# Patient Record
Sex: FEMALE | Race: BLACK OR AFRICAN AMERICAN | NOT HISPANIC OR LATINO | Employment: PART TIME | URBAN - METROPOLITAN AREA
[De-identification: names, ages, dates, MRNs, and addresses within clinical notes are randomized per-mention and may not be internally consistent; named-entity substitution may affect disease eponyms.]

---

## 2018-05-01 ENCOUNTER — OFFICE VISIT (OUTPATIENT)
Dept: OCCUPATIONAL MEDICINE | Facility: CLINIC | Age: 23
End: 2018-05-01
Payer: OTHER MISCELLANEOUS

## 2018-05-01 DIAGNOSIS — L03.115 CELLULITIS OF RIGHT FOOT: ICD-10-CM

## 2018-05-01 DIAGNOSIS — S91.331A PUNCTURE WOUND OF RIGHT FOOT, INITIAL ENCOUNTER: Primary | ICD-10-CM

## 2018-05-01 PROCEDURE — 90714 TD VACC NO PRESV 7 YRS+ IM: CPT | Mod: S$GLB,,, | Performed by: PREVENTIVE MEDICINE

## 2018-05-01 PROCEDURE — 99203 OFFICE O/P NEW LOW 30 MIN: CPT | Mod: 25,S$GLB,, | Performed by: PREVENTIVE MEDICINE

## 2018-05-01 PROCEDURE — 90471 IMMUNIZATION ADMIN: CPT | Mod: S$GLB,,, | Performed by: PREVENTIVE MEDICINE

## 2018-05-01 RX ORDER — FLUTICASONE FUROATE AND VILANTEROL TRIFENATATE 100; 25 UG/1; UG/1
POWDER RESPIRATORY (INHALATION)
COMMUNITY
Start: 2018-03-24 | End: 2021-01-04

## 2018-05-01 RX ORDER — DOXYCYCLINE HYCLATE 100 MG
100 TABLET ORAL 2 TIMES DAILY
Qty: 14 TABLET | Refills: 0 | Status: SHIPPED | OUTPATIENT
Start: 2018-05-01 | End: 2021-01-04

## 2018-05-01 NOTE — LETTER
[facility] [place]  [place] [address]  Phone: [phone]  Fax: [phone]    Pt Name: Magda Lowery  Injury Date: 05/01/2018   Employee ID:  Date of Treatment: 05/01/2018   Company: [employer]            Appointment Time: 04:25 PM Arrived:  4:40 PM CDT   Appointment Date: [unfilled] Time Out:   Physician: Lux Muñiz MD        Office Treatment: Magda was seen today for puncture wound.    Diagnoses and all orders for this visit:  EXAM  TD VACCINE  REGULAR DUTY    Puncture wound of right foot, initial encounter  -     doxycycline (VIBRA-TABS) 100 MG tablet; Take 1 tablet (100 mg total) by mouth 2 (two) times daily.  Cellulitis of right foot  Other orders       Patient Instructions:  (Cleanse wound site at home with soap and water daily. TD vaccine updated)    Restrictions: Regular Duty, Discharged from Occupational Health       Return Appointment: NONE

## 2018-05-01 NOTE — PROGRESS NOTES
Subjective:       Patient ID: Magda Lowery is a 22 y.o. female.    Chief Complaint: Puncture Wound    Pt works at [employer].  She stepped on an alarm tag that punctured her RIGHT FOOT.  She is in no pain.  CT      ROS    Objective:      Physical Exam   Constitutional: She appears well-developed and well-nourished.   HENT:   Head: Normocephalic and atraumatic.   Eyes: EOM are normal.   Neck: Normal range of motion.   Cardiovascular: Normal rate.    Pulmonary/Chest: Effort normal.   Musculoskeletal:        Lumbar back: She exhibits normal range of motion, no tenderness, no bony tenderness, no swelling, no edema, no deformity, no laceration, no pain, no spasm and normal pulse.   Neurological: She is alert.   No focal neurologic deficits   Skin: Skin is warm and dry. Ecchymosis noted.   Puncture wound about the plantar aspect of right foot. Mild swelling with ecchymosis noted with palpation of the plantar aspect of right foot. Distal pulses good. Full range of motion of right ankle and foot.   Psychiatric: She has a normal mood and affect.   Nursing note and vitals reviewed.      Assessment:       1. Puncture wound of right foot, initial encounter    2. Cellulitis of right foot        Plan:           Medications Ordered This Encounter      doxycycline (VIBRA-TABS) 100 MG tablet          Sig: Take 1 tablet (100 mg total) by mouth 2 (two) times daily.          Dispense:  14 tablet          Refill:  0  Patient Instructions:  (Cleanse wound site at home with soap and water daily. TD vaccine updated)   Restrictions: Regular Duty, Discharged from Occupational Health  Follow-up if symptoms worsen or fail to improve.

## 2019-03-15 ENCOUNTER — TELEPHONE (OUTPATIENT)
Dept: OBSTETRICS AND GYNECOLOGY | Facility: CLINIC | Age: 24
End: 2019-03-15

## 2019-03-15 NOTE — TELEPHONE ENCOUNTER
----- Message from Cindy Bhatt sent at 3/15/2019  8:38 AM CDT -----  Contact: [phone]/self  Patient called in returning your call. Please advise.

## 2021-01-04 ENCOUNTER — OFFICE VISIT (OUTPATIENT)
Dept: FAMILY MEDICINE | Facility: CLINIC | Age: 26
End: 2021-01-04
Payer: COMMERCIAL

## 2021-01-04 VITALS
BODY MASS INDEX: 36.72 KG/M2 | OXYGEN SATURATION: 99 % | DIASTOLIC BLOOD PRESSURE: 70 MMHG | SYSTOLIC BLOOD PRESSURE: 100 MMHG | TEMPERATURE: 97 F | HEIGHT: 68 IN | WEIGHT: 242.31 LBS | HEART RATE: 88 BPM

## 2021-01-04 DIAGNOSIS — Z01.419 WELL WOMAN EXAM WITH ROUTINE GYNECOLOGICAL EXAM: Primary | ICD-10-CM

## 2021-01-04 DIAGNOSIS — Z13.220 SCREENING FOR CHOLESTEROL LEVEL: ICD-10-CM

## 2021-01-04 DIAGNOSIS — Z11.59 NEED FOR HEPATITIS C SCREENING TEST: ICD-10-CM

## 2021-01-04 DIAGNOSIS — Z11.4 ENCOUNTER FOR SCREENING FOR HIV: ICD-10-CM

## 2021-01-04 DIAGNOSIS — J45.909 ASTHMA, UNSPECIFIED ASTHMA SEVERITY, UNSPECIFIED WHETHER COMPLICATED, UNSPECIFIED WHETHER PERSISTENT: ICD-10-CM

## 2021-01-04 PROCEDURE — 99999 PR PBB SHADOW E&M-EST. PATIENT-LVL III: ICD-10-PCS | Mod: PBBFAC,,, | Performed by: STUDENT IN AN ORGANIZED HEALTH CARE EDUCATION/TRAINING PROGRAM

## 2021-01-04 PROCEDURE — 99385 PREV VISIT NEW AGE 18-39: CPT | Mod: S$GLB,,, | Performed by: STUDENT IN AN ORGANIZED HEALTH CARE EDUCATION/TRAINING PROGRAM

## 2021-01-04 PROCEDURE — 99999 PR PBB SHADOW E&M-EST. PATIENT-LVL III: CPT | Mod: PBBFAC,,, | Performed by: STUDENT IN AN ORGANIZED HEALTH CARE EDUCATION/TRAINING PROGRAM

## 2021-01-04 PROCEDURE — 99385 PR PREVENTIVE VISIT,NEW,18-39: ICD-10-PCS | Mod: S$GLB,,, | Performed by: STUDENT IN AN ORGANIZED HEALTH CARE EDUCATION/TRAINING PROGRAM

## 2021-01-04 PROCEDURE — 1126F PR PAIN SEVERITY QUANTIFIED, NO PAIN PRESENT: ICD-10-PCS | Mod: S$GLB,,, | Performed by: STUDENT IN AN ORGANIZED HEALTH CARE EDUCATION/TRAINING PROGRAM

## 2021-01-04 PROCEDURE — 3008F PR BODY MASS INDEX (BMI) DOCUMENTED: ICD-10-PCS | Mod: CPTII,S$GLB,, | Performed by: STUDENT IN AN ORGANIZED HEALTH CARE EDUCATION/TRAINING PROGRAM

## 2021-01-04 PROCEDURE — 1126F AMNT PAIN NOTED NONE PRSNT: CPT | Mod: S$GLB,,, | Performed by: STUDENT IN AN ORGANIZED HEALTH CARE EDUCATION/TRAINING PROGRAM

## 2021-01-04 PROCEDURE — 3008F BODY MASS INDEX DOCD: CPT | Mod: CPTII,S$GLB,, | Performed by: STUDENT IN AN ORGANIZED HEALTH CARE EDUCATION/TRAINING PROGRAM

## 2021-01-04 RX ORDER — ALBUTEROL SULFATE 0.83 MG/ML
SOLUTION RESPIRATORY (INHALATION)
COMMUNITY
Start: 2021-01-03 | End: 2021-01-04 | Stop reason: SDUPTHER

## 2021-01-04 RX ORDER — ALBUTEROL SULFATE 0.83 MG/ML
2.5 SOLUTION RESPIRATORY (INHALATION)
Qty: 3 ML | Refills: 3 | Status: SHIPPED | OUTPATIENT
Start: 2021-01-04 | End: 2023-08-25

## 2021-01-04 RX ORDER — ALBUTEROL SULFATE 90 UG/1
2 AEROSOL, METERED RESPIRATORY (INHALATION) EVERY 6 HOURS PRN
Qty: 18 G | Refills: 0 | Status: SHIPPED | OUTPATIENT
Start: 2021-01-04 | End: 2021-03-23 | Stop reason: SDUPTHER

## 2021-01-05 ENCOUNTER — LAB VISIT (OUTPATIENT)
Dept: LAB | Facility: HOSPITAL | Age: 26
End: 2021-01-05
Attending: STUDENT IN AN ORGANIZED HEALTH CARE EDUCATION/TRAINING PROGRAM
Payer: COMMERCIAL

## 2021-01-05 DIAGNOSIS — Z11.4 ENCOUNTER FOR SCREENING FOR HIV: ICD-10-CM

## 2021-01-05 DIAGNOSIS — Z13.220 SCREENING FOR CHOLESTEROL LEVEL: ICD-10-CM

## 2021-01-05 DIAGNOSIS — Z11.59 NEED FOR HEPATITIS C SCREENING TEST: ICD-10-CM

## 2021-01-05 DIAGNOSIS — Z01.419 WELL WOMAN EXAM WITH ROUTINE GYNECOLOGICAL EXAM: ICD-10-CM

## 2021-01-05 LAB
25(OH)D3+25(OH)D2 SERPL-MCNC: 7 NG/ML (ref 30–96)
ALBUMIN SERPL BCP-MCNC: 4 G/DL (ref 3.5–5.2)
ALP SERPL-CCNC: 79 U/L (ref 55–135)
ALT SERPL W/O P-5'-P-CCNC: 9 U/L (ref 10–44)
ANION GAP SERPL CALC-SCNC: 9 MMOL/L (ref 8–16)
AST SERPL-CCNC: 16 U/L (ref 10–40)
BASOPHILS # BLD AUTO: 0.02 K/UL (ref 0–0.2)
BASOPHILS NFR BLD: 0.8 % (ref 0–1.9)
BILIRUB SERPL-MCNC: 0.4 MG/DL (ref 0.1–1)
BUN SERPL-MCNC: 8 MG/DL (ref 6–20)
CALCIUM SERPL-MCNC: 9.2 MG/DL (ref 8.7–10.5)
CHLORIDE SERPL-SCNC: 106 MMOL/L (ref 95–110)
CHOLEST SERPL-MCNC: 172 MG/DL (ref 120–199)
CHOLEST/HDLC SERPL: 2.7 {RATIO} (ref 2–5)
CO2 SERPL-SCNC: 26 MMOL/L (ref 23–29)
CREAT SERPL-MCNC: 0.8 MG/DL (ref 0.5–1.4)
DIFFERENTIAL METHOD: ABNORMAL
EOSINOPHIL # BLD AUTO: 0.1 K/UL (ref 0–0.5)
EOSINOPHIL NFR BLD: 4.2 % (ref 0–8)
ERYTHROCYTE [DISTWIDTH] IN BLOOD BY AUTOMATED COUNT: 12.6 % (ref 11.5–14.5)
EST. GFR  (AFRICAN AMERICAN): >60 ML/MIN/1.73 M^2
EST. GFR  (NON AFRICAN AMERICAN): >60 ML/MIN/1.73 M^2
ESTIMATED AVG GLUCOSE: 103 MG/DL (ref 68–131)
GLUCOSE SERPL-MCNC: 85 MG/DL (ref 70–110)
HBA1C MFR BLD HPLC: 5.2 % (ref 4–5.6)
HCT VFR BLD AUTO: 40.5 % (ref 37–48.5)
HDLC SERPL-MCNC: 64 MG/DL (ref 40–75)
HDLC SERPL: 37.2 % (ref 20–50)
HGB BLD-MCNC: 13.1 G/DL (ref 12–16)
IMM GRANULOCYTES # BLD AUTO: 0 K/UL (ref 0–0.04)
IMM GRANULOCYTES NFR BLD AUTO: 0 % (ref 0–0.5)
LDLC SERPL CALC-MCNC: 102 MG/DL (ref 63–159)
LYMPHOCYTES # BLD AUTO: 1 K/UL (ref 1–4.8)
LYMPHOCYTES NFR BLD: 37.9 % (ref 18–48)
MCH RBC QN AUTO: 30.5 PG (ref 27–31)
MCHC RBC AUTO-ENTMCNC: 32.3 G/DL (ref 32–36)
MCV RBC AUTO: 94 FL (ref 82–98)
MONOCYTES # BLD AUTO: 0.3 K/UL (ref 0.3–1)
MONOCYTES NFR BLD: 10 % (ref 4–15)
NEUTROPHILS # BLD AUTO: 1.2 K/UL (ref 1.8–7.7)
NEUTROPHILS NFR BLD: 47.1 % (ref 38–73)
NONHDLC SERPL-MCNC: 108 MG/DL
NRBC BLD-RTO: 0 /100 WBC
PLATELET # BLD AUTO: 281 K/UL (ref 150–350)
PMV BLD AUTO: 9 FL (ref 9.2–12.9)
POTASSIUM SERPL-SCNC: 4 MMOL/L (ref 3.5–5.1)
PROT SERPL-MCNC: 7.6 G/DL (ref 6–8.4)
RBC # BLD AUTO: 4.29 M/UL (ref 4–5.4)
SODIUM SERPL-SCNC: 141 MMOL/L (ref 136–145)
T4 FREE SERPL-MCNC: 0.8 NG/DL (ref 0.71–1.51)
TRIGL SERPL-MCNC: 30 MG/DL (ref 30–150)
TSH SERPL DL<=0.005 MIU/L-ACNC: 8.93 UIU/ML (ref 0.4–4)
WBC # BLD AUTO: 2.61 K/UL (ref 3.9–12.7)

## 2021-01-05 PROCEDURE — 80053 COMPREHEN METABOLIC PANEL: CPT

## 2021-01-05 PROCEDURE — 86803 HEPATITIS C AB TEST: CPT

## 2021-01-05 PROCEDURE — 82306 VITAMIN D 25 HYDROXY: CPT

## 2021-01-05 PROCEDURE — 36415 COLL VENOUS BLD VENIPUNCTURE: CPT | Mod: PO

## 2021-01-05 PROCEDURE — 84439 ASSAY OF FREE THYROXINE: CPT

## 2021-01-05 PROCEDURE — 83036 HEMOGLOBIN GLYCOSYLATED A1C: CPT

## 2021-01-05 PROCEDURE — 86703 HIV-1/HIV-2 1 RESULT ANTBDY: CPT

## 2021-01-05 PROCEDURE — 85025 COMPLETE CBC W/AUTO DIFF WBC: CPT

## 2021-01-05 PROCEDURE — 80061 LIPID PANEL: CPT

## 2021-01-05 PROCEDURE — 84443 ASSAY THYROID STIM HORMONE: CPT

## 2021-01-06 DIAGNOSIS — E03.8 SUBCLINICAL HYPOTHYROIDISM: ICD-10-CM

## 2021-01-06 DIAGNOSIS — E55.9 HYPOVITAMINOSIS D: Primary | ICD-10-CM

## 2021-01-06 LAB
HCV AB SERPL QL IA: NEGATIVE
HIV 1+2 AB+HIV1 P24 AG SERPL QL IA: NEGATIVE

## 2021-01-06 RX ORDER — ERGOCALCIFEROL 1.25 MG/1
50000 CAPSULE ORAL
Qty: 12 CAPSULE | Refills: 0 | Status: SHIPPED | OUTPATIENT
Start: 2021-01-06 | End: 2023-08-25

## 2021-04-05 ENCOUNTER — PATIENT MESSAGE (OUTPATIENT)
Dept: ADMINISTRATIVE | Facility: HOSPITAL | Age: 26
End: 2021-04-05

## 2021-04-06 ENCOUNTER — LAB VISIT (OUTPATIENT)
Dept: LAB | Facility: HOSPITAL | Age: 26
End: 2021-04-06
Attending: STUDENT IN AN ORGANIZED HEALTH CARE EDUCATION/TRAINING PROGRAM
Payer: COMMERCIAL

## 2021-04-06 DIAGNOSIS — E55.9 HYPOVITAMINOSIS D: ICD-10-CM

## 2021-04-06 DIAGNOSIS — E03.8 SUBCLINICAL HYPOTHYROIDISM: ICD-10-CM

## 2021-04-06 LAB
T4 FREE SERPL-MCNC: 0.75 NG/DL (ref 0.71–1.51)
TSH SERPL DL<=0.005 MIU/L-ACNC: 6.18 UIU/ML (ref 0.4–4)

## 2021-04-06 PROCEDURE — 82306 VITAMIN D 25 HYDROXY: CPT | Performed by: STUDENT IN AN ORGANIZED HEALTH CARE EDUCATION/TRAINING PROGRAM

## 2021-04-06 PROCEDURE — 84443 ASSAY THYROID STIM HORMONE: CPT | Performed by: STUDENT IN AN ORGANIZED HEALTH CARE EDUCATION/TRAINING PROGRAM

## 2021-04-06 PROCEDURE — 84439 ASSAY OF FREE THYROXINE: CPT | Performed by: STUDENT IN AN ORGANIZED HEALTH CARE EDUCATION/TRAINING PROGRAM

## 2021-04-06 PROCEDURE — 36415 COLL VENOUS BLD VENIPUNCTURE: CPT | Mod: PO | Performed by: STUDENT IN AN ORGANIZED HEALTH CARE EDUCATION/TRAINING PROGRAM

## 2021-04-07 LAB — 25(OH)D3+25(OH)D2 SERPL-MCNC: 24 NG/ML (ref 30–96)

## 2021-04-08 DIAGNOSIS — E03.8 SUBCLINICAL HYPOTHYROIDISM: Primary | ICD-10-CM

## 2021-05-04 ENCOUNTER — PATIENT MESSAGE (OUTPATIENT)
Dept: RESEARCH | Facility: HOSPITAL | Age: 26
End: 2021-05-04

## 2021-05-20 ENCOUNTER — IMMUNIZATION (OUTPATIENT)
Dept: PHARMACY | Facility: CLINIC | Age: 26
End: 2021-05-20
Payer: COMMERCIAL

## 2021-05-20 DIAGNOSIS — Z23 NEED FOR VACCINATION: Primary | ICD-10-CM

## 2021-07-06 ENCOUNTER — PATIENT MESSAGE (OUTPATIENT)
Dept: ADMINISTRATIVE | Facility: HOSPITAL | Age: 26
End: 2021-07-06

## 2021-10-04 ENCOUNTER — PATIENT MESSAGE (OUTPATIENT)
Dept: ADMINISTRATIVE | Facility: HOSPITAL | Age: 26
End: 2021-10-04

## 2021-10-04 ENCOUNTER — PATIENT MESSAGE (OUTPATIENT)
Dept: FAMILY MEDICINE | Facility: CLINIC | Age: 26
End: 2021-10-04

## 2021-10-08 ENCOUNTER — LAB VISIT (OUTPATIENT)
Dept: LAB | Facility: HOSPITAL | Age: 26
End: 2021-10-08
Attending: STUDENT IN AN ORGANIZED HEALTH CARE EDUCATION/TRAINING PROGRAM
Payer: COMMERCIAL

## 2021-10-08 DIAGNOSIS — E03.8 SUBCLINICAL HYPOTHYROIDISM: ICD-10-CM

## 2021-10-08 LAB
T4 FREE SERPL-MCNC: 0.61 NG/DL (ref 0.71–1.51)
TSH SERPL DL<=0.005 MIU/L-ACNC: 8.23 UIU/ML (ref 0.4–4)

## 2021-10-08 PROCEDURE — 36415 COLL VENOUS BLD VENIPUNCTURE: CPT | Mod: PO | Performed by: STUDENT IN AN ORGANIZED HEALTH CARE EDUCATION/TRAINING PROGRAM

## 2021-10-08 PROCEDURE — 84443 ASSAY THYROID STIM HORMONE: CPT | Performed by: STUDENT IN AN ORGANIZED HEALTH CARE EDUCATION/TRAINING PROGRAM

## 2021-10-08 PROCEDURE — 84439 ASSAY OF FREE THYROXINE: CPT | Performed by: STUDENT IN AN ORGANIZED HEALTH CARE EDUCATION/TRAINING PROGRAM

## 2022-03-02 ENCOUNTER — PATIENT MESSAGE (OUTPATIENT)
Dept: FAMILY MEDICINE | Facility: CLINIC | Age: 27
End: 2022-03-02

## 2022-03-02 ENCOUNTER — LAB VISIT (OUTPATIENT)
Dept: LAB | Facility: HOSPITAL | Age: 27
End: 2022-03-02
Attending: FAMILY MEDICINE
Payer: COMMERCIAL

## 2022-03-02 ENCOUNTER — OFFICE VISIT (OUTPATIENT)
Dept: FAMILY MEDICINE | Facility: CLINIC | Age: 27
End: 2022-03-02
Payer: COMMERCIAL

## 2022-03-02 VITALS
WEIGHT: 249.13 LBS | BODY MASS INDEX: 37.76 KG/M2 | HEART RATE: 96 BPM | HEIGHT: 68 IN | SYSTOLIC BLOOD PRESSURE: 100 MMHG | OXYGEN SATURATION: 98 % | DIASTOLIC BLOOD PRESSURE: 70 MMHG

## 2022-03-02 DIAGNOSIS — R94.6 ABNORMAL THYROID FUNCTION TEST: ICD-10-CM

## 2022-03-02 DIAGNOSIS — J45.20 MILD INTERMITTENT ASTHMA WITHOUT COMPLICATION: ICD-10-CM

## 2022-03-02 DIAGNOSIS — Z01.419 ENCOUNTER FOR WELL WOMAN EXAM WITH ROUTINE GYNECOLOGICAL EXAM: Primary | ICD-10-CM

## 2022-03-02 DIAGNOSIS — Z00.01 ENCOUNTER FOR GENERAL ADULT MEDICAL EXAMINATION WITH ABNORMAL FINDINGS: ICD-10-CM

## 2022-03-02 DIAGNOSIS — Z23 IMMUNIZATION DUE: ICD-10-CM

## 2022-03-02 DIAGNOSIS — Z12.4 SCREENING FOR CERVICAL CANCER: ICD-10-CM

## 2022-03-02 DIAGNOSIS — E66.09 CLASS 2 OBESITY DUE TO EXCESS CALORIES WITHOUT SERIOUS COMORBIDITY WITH BODY MASS INDEX (BMI) OF 37.0 TO 37.9 IN ADULT: ICD-10-CM

## 2022-03-02 LAB
T4 FREE SERPL-MCNC: 0.78 NG/DL (ref 0.71–1.51)
TSH SERPL DL<=0.005 MIU/L-ACNC: 6.3 UIU/ML (ref 0.4–4)

## 2022-03-02 PROCEDURE — 36415 COLL VENOUS BLD VENIPUNCTURE: CPT | Mod: PO | Performed by: FAMILY MEDICINE

## 2022-03-02 PROCEDURE — 1160F PR REVIEW ALL MEDS BY PRESCRIBER/CLIN PHARMACIST DOCUMENTED: ICD-10-PCS | Mod: CPTII,S$GLB,, | Performed by: FAMILY MEDICINE

## 2022-03-02 PROCEDURE — 3078F DIAST BP <80 MM HG: CPT | Mod: CPTII,S$GLB,, | Performed by: FAMILY MEDICINE

## 2022-03-02 PROCEDURE — 3008F PR BODY MASS INDEX (BMI) DOCUMENTED: ICD-10-PCS | Mod: CPTII,S$GLB,, | Performed by: FAMILY MEDICINE

## 2022-03-02 PROCEDURE — 88175 CYTOPATH C/V AUTO FLUID REDO: CPT | Performed by: FAMILY MEDICINE

## 2022-03-02 PROCEDURE — 99395 PREV VISIT EST AGE 18-39: CPT | Mod: S$GLB,,, | Performed by: FAMILY MEDICINE

## 2022-03-02 PROCEDURE — 1160F RVW MEDS BY RX/DR IN RCRD: CPT | Mod: CPTII,S$GLB,, | Performed by: FAMILY MEDICINE

## 2022-03-02 PROCEDURE — 3074F PR MOST RECENT SYSTOLIC BLOOD PRESSURE < 130 MM HG: ICD-10-PCS | Mod: CPTII,S$GLB,, | Performed by: FAMILY MEDICINE

## 2022-03-02 PROCEDURE — 84439 ASSAY OF FREE THYROXINE: CPT | Performed by: FAMILY MEDICINE

## 2022-03-02 PROCEDURE — 1159F MED LIST DOCD IN RCRD: CPT | Mod: CPTII,S$GLB,, | Performed by: FAMILY MEDICINE

## 2022-03-02 PROCEDURE — 3074F SYST BP LT 130 MM HG: CPT | Mod: CPTII,S$GLB,, | Performed by: FAMILY MEDICINE

## 2022-03-02 PROCEDURE — 99999 PR PBB SHADOW E&M-EST. PATIENT-LVL III: CPT | Mod: PBBFAC,,, | Performed by: FAMILY MEDICINE

## 2022-03-02 PROCEDURE — 1159F PR MEDICATION LIST DOCUMENTED IN MEDICAL RECORD: ICD-10-PCS | Mod: CPTII,S$GLB,, | Performed by: FAMILY MEDICINE

## 2022-03-02 PROCEDURE — 84443 ASSAY THYROID STIM HORMONE: CPT | Performed by: FAMILY MEDICINE

## 2022-03-02 PROCEDURE — 3078F PR MOST RECENT DIASTOLIC BLOOD PRESSURE < 80 MM HG: ICD-10-PCS | Mod: CPTII,S$GLB,, | Performed by: FAMILY MEDICINE

## 2022-03-02 PROCEDURE — 3008F BODY MASS INDEX DOCD: CPT | Mod: CPTII,S$GLB,, | Performed by: FAMILY MEDICINE

## 2022-03-02 PROCEDURE — 99999 PR PBB SHADOW E&M-EST. PATIENT-LVL III: ICD-10-PCS | Mod: PBBFAC,,, | Performed by: FAMILY MEDICINE

## 2022-03-02 PROCEDURE — 99395 PR PREVENTIVE VISIT,EST,18-39: ICD-10-PCS | Mod: S$GLB,,, | Performed by: FAMILY MEDICINE

## 2022-03-02 RX ORDER — ALBUTEROL SULFATE 90 UG/1
2 AEROSOL, METERED RESPIRATORY (INHALATION) EVERY 6 HOURS PRN
Qty: 18 G | Refills: 6 | Status: SHIPPED | OUTPATIENT
Start: 2022-03-02 | End: 2023-08-25 | Stop reason: SDUPTHER

## 2022-03-02 NOTE — PROGRESS NOTES
Subjective:       Patient ID: Magda Lowery is a 26 y.o. female.    Chief Complaint: Establish Care    Patient Active Problem List   Diagnosis    Thyroid nodule    Mild intermittent asthma without complication      HPI  27 yo female here for pap smear. No prior abn pap smear.     Review of Systems   All other systems reviewed and are negative.       Results for orders placed or performed in visit on 10/08/21   TSH   Result Value Ref Range    TSH 8.234 (H) 0.400 - 4.000 uIU/mL   T4, Free   Result Value Ref Range    Free T4 0.61 (L) 0.71 - 1.51 ng/dL     Objective:     Vitals:    03/02/22 1057   BP: 100/70   Pulse: 96        Physical Exam  Vitals and nursing note reviewed. Exam conducted with a chaperone present.   Constitutional:       General: She is not in acute distress.     Appearance: Normal appearance. She is not ill-appearing, toxic-appearing or diaphoretic.   HENT:      Head: Normocephalic and atraumatic.   Eyes:      General: No scleral icterus.     Conjunctiva/sclera: Conjunctivae normal.   Cardiovascular:      Rate and Rhythm: Normal rate.   Pulmonary:      Effort: Pulmonary effort is normal. No respiratory distress.   Genitourinary:     General: Normal vulva.      Exam position: Lithotomy position.      Pubic Area: No rash.       Labia:         Right: No rash, tenderness, lesion or injury.         Left: No rash, tenderness, lesion or injury.       Urethra: No prolapse, urethral pain, urethral swelling or urethral lesion.      Vagina: Normal. No lesions.      Cervix: No cervical motion tenderness, lesion or erythema.      Uterus: Normal. Not tender.       Adnexa: Right adnexa normal and left adnexa normal.        Right: No mass, tenderness or fullness.          Left: No mass, tenderness or fullness.     Skin:     General: Skin is dry.      Coloration: Skin is not pale.   Neurological:      Mental Status: She is alert. Mental status is at baseline.   Psychiatric:         Attention and Perception:  Attention and perception normal.         Mood and Affect: Mood and affect normal.         Speech: Speech normal.         Behavior: Behavior normal.         Cognition and Memory: Cognition and memory normal.         Judgment: Judgment normal.         Assessment:       1. Encounter for well woman exam with routine gynecological exam    2. Encounter for general adult medical examination with abnormal findings    3. Screening for cervical cancer    4. Class 2 obesity due to excess calories without serious comorbidity with body mass index (BMI) of 37.0 to 37.9 in adult    5. Abnormal thyroid function test    6. Immunization due    7. Mild intermittent asthma without complication        Plan:         Encounter for well woman exam with routine gynecological exam  -     Liquid-Based Pap Smear, Screening  -     Cancel: HPV High Risk Genotypes, PCR    Encounter for general adult medical examination with abnormal findings  - Risk and age appropriate anticipatory guidance.  reviewed and updated. Recommendations discussed with patient as appropriate.     Screening for cervical cancer  -     Liquid-Based Pap Smear, Screening  -     Cancel: HPV High Risk Genotypes, PCR    Class 2 obesity due to excess calories without serious comorbidity with body mass index (BMI) of 37.0 to 37.9 in adult  - Lifestyle modifications    Abnormal thyroid function test  -     TSH; Future; Expected date: 03/02/2022    Immunization due  - Info for HPV vaccinations given. Would like to think more on it per patient.     Mild intermittent asthma without complication  -     albuterol (PROVENTIL/VENTOLIN HFA) 90 mcg/actuation inhaler; Inhale 2 puffs into the lungs every 6 (six) hours as needed for Wheezing or Shortness of Breath. Rescue  Dispense: 18 g; Refill: 6  - Stable, refilled.     Patient's questions answered. Plan reviewed with patient at the end of visit. Relevant precautions to chief complaint and reasons to seek medical care or contact the  office sooner reviewed with patient.     Follow up in about 6 months (around 9/2/2022) for Annual Exam.

## 2022-03-03 ENCOUNTER — TELEPHONE (OUTPATIENT)
Dept: INTERNAL MEDICINE | Facility: CLINIC | Age: 27
End: 2022-03-03
Payer: COMMERCIAL

## 2022-03-03 ENCOUNTER — PATIENT MESSAGE (OUTPATIENT)
Dept: FAMILY MEDICINE | Facility: CLINIC | Age: 27
End: 2022-03-03
Payer: COMMERCIAL

## 2022-03-03 DIAGNOSIS — E03.8 SUBCLINICAL HYPOTHYROIDISM: Primary | ICD-10-CM

## 2022-03-03 DIAGNOSIS — E04.2 MULTINODULAR THYROID: ICD-10-CM

## 2022-03-03 NOTE — TELEPHONE ENCOUNTER
----- Message from La Nena Ramirez MD sent at 3/3/2022  8:11 AM CST -----  Please schedule TSH in 6 months. Thyroid US should be in next 1-2 months. Thanks!

## 2022-03-22 ENCOUNTER — PATIENT MESSAGE (OUTPATIENT)
Dept: FAMILY MEDICINE | Facility: CLINIC | Age: 27
End: 2022-03-22
Payer: COMMERCIAL

## 2022-04-10 ENCOUNTER — HISTORICAL (OUTPATIENT)
Dept: ADMINISTRATIVE | Facility: HOSPITAL | Age: 27
End: 2022-04-10
Payer: COMMERCIAL

## 2022-04-12 ENCOUNTER — HOSPITAL ENCOUNTER (OUTPATIENT)
Dept: RADIOLOGY | Facility: HOSPITAL | Age: 27
Discharge: HOME | End: 2022-04-12
Attending: FAMILY MEDICINE
Payer: COMMERCIAL

## 2022-04-12 DIAGNOSIS — E04.2 MULTINODULAR THYROID: ICD-10-CM

## 2022-04-12 PROCEDURE — 76536 US SOFT TISSUE HEAD NECK THYROID: ICD-10-PCS | Mod: 26,,, | Performed by: RADIOLOGY

## 2022-04-12 PROCEDURE — 76536 US EXAM OF HEAD AND NECK: CPT | Mod: 26,,, | Performed by: RADIOLOGY

## 2022-04-12 PROCEDURE — 76536 US EXAM OF HEAD AND NECK: CPT | Mod: TC

## 2022-04-21 ENCOUNTER — PATIENT MESSAGE (OUTPATIENT)
Dept: FAMILY MEDICINE | Facility: CLINIC | Age: 27
End: 2022-04-21
Payer: COMMERCIAL

## 2022-04-21 DIAGNOSIS — E04.1 THYROID NODULE: ICD-10-CM

## 2022-04-21 DIAGNOSIS — E04.1 CYSTIC THYROID NODULE: Primary | ICD-10-CM

## 2022-04-30 VITALS
SYSTOLIC BLOOD PRESSURE: 124 MMHG | DIASTOLIC BLOOD PRESSURE: 75 MMHG | HEIGHT: 67 IN | WEIGHT: 224 LBS | OXYGEN SATURATION: 97 % | BODY MASS INDEX: 35.16 KG/M2

## 2022-09-06 ENCOUNTER — TELEPHONE (OUTPATIENT)
Dept: FAMILY MEDICINE | Facility: CLINIC | Age: 27
End: 2022-09-06
Payer: COMMERCIAL

## 2022-09-06 ENCOUNTER — LAB VISIT (OUTPATIENT)
Dept: LAB | Facility: HOSPITAL | Age: 27
End: 2022-09-06
Attending: FAMILY MEDICINE
Payer: COMMERCIAL

## 2022-09-06 ENCOUNTER — PATIENT MESSAGE (OUTPATIENT)
Dept: FAMILY MEDICINE | Facility: CLINIC | Age: 27
End: 2022-09-06
Payer: COMMERCIAL

## 2022-09-06 DIAGNOSIS — E03.8 SUBCLINICAL HYPOTHYROIDISM: ICD-10-CM

## 2022-09-06 DIAGNOSIS — E04.2 MULTINODULAR THYROID: ICD-10-CM

## 2022-09-06 DIAGNOSIS — Z00.01 ENCOUNTER FOR GENERAL ADULT MEDICAL EXAMINATION WITH ABNORMAL FINDINGS: Primary | ICD-10-CM

## 2022-09-06 LAB
T4 FREE SERPL-MCNC: 0.73 NG/DL (ref 0.71–1.51)
TSH SERPL DL<=0.005 MIU/L-ACNC: 7.65 UIU/ML (ref 0.4–4)

## 2022-09-06 PROCEDURE — 36415 COLL VENOUS BLD VENIPUNCTURE: CPT | Mod: PO | Performed by: FAMILY MEDICINE

## 2022-09-06 PROCEDURE — 84439 ASSAY OF FREE THYROXINE: CPT | Performed by: FAMILY MEDICINE

## 2022-09-06 PROCEDURE — 84443 ASSAY THYROID STIM HORMONE: CPT | Performed by: FAMILY MEDICINE

## 2022-10-04 ENCOUNTER — OCCUPATIONAL HEALTH (OUTPATIENT)
Dept: URGENT CARE | Facility: CLINIC | Age: 27
End: 2022-10-04
Payer: OTHER MISCELLANEOUS

## 2022-10-04 DIAGNOSIS — Z20.822 COVID-19 RULED OUT BY LABORATORY TESTING: Primary | ICD-10-CM

## 2022-10-04 DIAGNOSIS — U07.1 COVID-19 VIRUS DETECTED: ICD-10-CM

## 2022-10-04 LAB
CTP QC/QA: YES
SARS-COV-2 AG RESP QL IA.RAPID: POSITIVE

## 2022-10-04 PROCEDURE — 87811 SARS-COV-2 COVID19 W/OPTIC: CPT | Mod: QW,S$GLB,, | Performed by: NURSE PRACTITIONER

## 2022-10-04 PROCEDURE — 87811 SARS CORONAVIRUS 2 ANTIGEN POCT, MANUAL READ: ICD-10-PCS | Mod: QW,S$GLB,, | Performed by: NURSE PRACTITIONER

## 2022-10-12 ENCOUNTER — PATIENT MESSAGE (OUTPATIENT)
Dept: URGENT CARE | Facility: CLINIC | Age: 27
End: 2022-10-12
Payer: COMMERCIAL

## 2022-10-12 NOTE — TELEPHONE ENCOUNTER
It appears you were able to view your covid results on your portal.  If you have any questions or persistent symptoms, you may return to urgent care or follow up with your provider for further evaluation. Go to ER for difficulty breathing.     You have tested positive for COVID-19 today.           ISOLATION    If you tested positive and do not have symptoms, you must isolate for 5 days starting on the day of the positive test.          If you tested positive and have symptoms, you must isolate for 5 days starting on the day of the first symptoms,  not the day of the positive test.       Both the CDC and the [facility] emphasize that you do not test out of isolation.         After 5 days, if your symptoms have improved and you have not had fever on day 5, you can return to the community on day 6- NO TESTING REQUIRED!          In fact, we do not retest if you were positive in the last 90 days.         After your 5 days of isolation are completed, the CDC recommends strict mask use for the first 5 days that you come out of isolation.

## 2022-12-12 NOTE — TELEPHONE ENCOUNTER
12/12/2022 2004  Hilary Carpio  [address] [facility] [address]    To Whom It May Concern:    This is to certify that Hilary Carpio was evaluated in the Urgent Care on 12/12/2022 excuse from school due to influenza.  May return to school once fever free for 24 hours        Elvi Burnette MD    [facility] [address]  [facility] [address] [address] [facility] [address] [phone]     LVM for patient stating her labs and ultrasound were scheduled. She can see dates and times in her My Chart Portal. Instructed to call us back with any questions.

## 2022-12-29 ENCOUNTER — HOSPITAL ENCOUNTER (EMERGENCY)
Facility: HOSPITAL | Age: 27
Discharge: HOME | End: 2022-12-30
Attending: EMERGENCY MEDICINE
Payer: COMMERCIAL

## 2022-12-29 VITALS
RESPIRATION RATE: 18 BRPM | WEIGHT: 230 LBS | OXYGEN SATURATION: 97 % | BODY MASS INDEX: 34.97 KG/M2 | HEART RATE: 101 BPM | DIASTOLIC BLOOD PRESSURE: 75 MMHG | SYSTOLIC BLOOD PRESSURE: 130 MMHG | TEMPERATURE: 99 F

## 2022-12-29 DIAGNOSIS — S83.006A PATELLAR DISLOCATION, UNSPECIFIED LATERALITY, INITIAL ENCOUNTER: Primary | ICD-10-CM

## 2022-12-29 DIAGNOSIS — S82.842A CLOSED BIMALLEOLAR FRACTURE OF LEFT ANKLE, INITIAL ENCOUNTER: ICD-10-CM
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
## 2022-12-29 PROCEDURE — 99285 EMERGENCY DEPT VISIT HI MDM: CPT | Mod: 25

## 2022-12-29 PROCEDURE — 29515 APPLICATION SHORT LEG SPLINT: CPT | Mod: LT

## 2022-12-29 PROCEDURE — 96374 THER/PROPH/DIAG INJ IV PUSH: CPT

## 2022-12-29 PROCEDURE — 63600175 PHARM REV CODE 636 W HCPCS: Performed by: STUDENT IN AN ORGANIZED HEALTH CARE EDUCATION/TRAINING PROGRAM

## 2022-12-29 PROCEDURE — 96376 TX/PRO/DX INJ SAME DRUG ADON: CPT

## 2022-12-29 PROCEDURE — 96375 TX/PRO/DX INJ NEW DRUG ADDON: CPT

## 2022-12-29 PROCEDURE — 27560 TREAT KNEECAP DISLOCATION: CPT | Mod: LT

## 2022-12-29 PROCEDURE — 99284 PR EMERGENCY DEPT VISIT,LEVEL IV: ICD-10-PCS | Mod: ,,, | Performed by: EMERGENCY MEDICINE

## 2022-12-29 PROCEDURE — 99284 EMERGENCY DEPT VISIT MOD MDM: CPT | Mod: ,,, | Performed by: EMERGENCY MEDICINE

## 2022-12-29 RX ORDER — ACETAMINOPHEN 500 MG
1000 TABLET ORAL EVERY 8 HOURS
Qty: 60 TABLET | Refills: 0 | Status: SHIPPED | OUTPATIENT
Start: 2022-12-29 | End: 2023-01-07

## 2022-12-29 RX ORDER — METHOCARBAMOL 750 MG/1
750 TABLET, FILM COATED ORAL EVERY 8 HOURS
Qty: 30 TABLET | Refills: 0 | Status: SHIPPED | OUTPATIENT
Start: 2022-12-29 | End: 2023-01-07

## 2022-12-29 RX ORDER — HYDROMORPHONE HYDROCHLORIDE 1 MG/ML
1 INJECTION, SOLUTION INTRAMUSCULAR; INTRAVENOUS; SUBCUTANEOUS
Status: COMPLETED | OUTPATIENT
Start: 2022-12-29 | End: 2022-12-29

## 2022-12-29 RX ORDER — ASPIRIN 81 MG/1
81 TABLET ORAL 2 TIMES DAILY
Qty: 60 TABLET | Refills: 0 | Status: SHIPPED | OUTPATIENT
Start: 2022-12-29 | End: 2023-08-25

## 2022-12-29 RX ORDER — MORPHINE SULFATE 4 MG/ML
4 INJECTION, SOLUTION INTRAMUSCULAR; INTRAVENOUS
Status: COMPLETED | OUTPATIENT
Start: 2022-12-29 | End: 2022-12-29

## 2022-12-29 RX ORDER — OXYCODONE HYDROCHLORIDE 5 MG/1
5 TABLET ORAL EVERY 4 HOURS PRN
Qty: 20 TABLET | Refills: 0 | Status: SHIPPED | OUTPATIENT
Start: 2022-12-29 | End: 2023-01-07

## 2022-12-29 RX ORDER — IBUPROFEN 800 MG/1
800 TABLET ORAL EVERY 8 HOURS
Qty: 30 TABLET | Refills: 0 | Status: SHIPPED | OUTPATIENT
Start: 2022-12-29 | End: 2023-01-07

## 2022-12-29 RX ADMIN — MORPHINE SULFATE 4 MG: 4 INJECTION INTRAVENOUS at 07:12

## 2022-12-29 RX ADMIN — MORPHINE SULFATE 4 MG: 4 INJECTION INTRAVENOUS at 06:12

## 2022-12-29 RX ADMIN — HYDROMORPHONE HYDROCHLORIDE 1 MG: 1 INJECTION, SOLUTION INTRAMUSCULAR; INTRAVENOUS; SUBCUTANEOUS at 07:12

## 2022-12-30 ENCOUNTER — PATIENT MESSAGE (OUTPATIENT)
Dept: ADMINISTRATIVE | Facility: OTHER | Age: 27
End: 2022-12-30
Payer: COMMERCIAL

## 2022-12-30 DIAGNOSIS — S82.842A BIMALLEOLAR ANKLE FRACTURE, LEFT, CLOSED, INITIAL ENCOUNTER: Primary | ICD-10-CM

## 2022-12-30 RX ORDER — MUPIROCIN 20 MG/G
OINTMENT TOPICAL
Status: CANCELLED | OUTPATIENT
Start: 2022-12-30

## 2022-12-30 RX ORDER — SODIUM CHLORIDE 9 MG/ML
INJECTION, SOLUTION INTRAVENOUS CONTINUOUS
Status: CANCELLED | OUTPATIENT
Start: 2022-12-30

## 2022-12-30 NOTE — DISCHARGE INSTRUCTIONS
Medication Instructions:    1. Take 800 mg Ibuprofen every 8 hours for pain.    2. Take 1000 mg Tylenol every 8 hours for pain    3. Take 750 mg of methocarbamol every 8 hours for pain and muscle spasms.    4. Take 5 mg of oxycodone every 4 - 6 hours as needed for pain not controlled by the above medications.    5. Take 81 mg of aspirin twice daily to help prevent blood clots.

## 2022-12-30 NOTE — H&P (VIEW-ONLY)
[facility] - Emergency Dept  Orthopedics  Consult Note    Patient Name: Magda Lowery  MRN: [id number]  Admission Date: 12/29/2022  Hospital Length of Stay: 0 days  Attending Provider: Sal Leigh MD  Primary Care Provider: La Nena Ramirez MD    Patient information was obtained from patient, parent and ER records.     Inpatient consult to Orthopedic Surgery  Consult performed by: Jaycob Monaco MD  Consult ordered by: Jagdish Bunch MD        Subjective:     Principal Problem:Bimalleolar ankle fracture, left, closed, initial encounter    Chief Complaint:   Chief Complaint   Patient presents with    Fall     Trip and fall in a parking lot, left knee dislocation and left ankle swelling--pt arrives splinted; denies hitting head; PMS intact, denies numbness/tingling        HPI: Magda Lowery is a 27 y.o. healthy female presenting to the emergency department after a ground level slip and fall earlier today.  She reports immediate left knee pain and left ankle pain.  She also reports that she dislocated her kneecap.  She reports this is her first time dislocating her kneecap.  She has been unable to bear weight on her left lower extremity since the fall.  She denies head trauma and loss of consciousness.  She denies pain outside of her left lower extremity.  She denies numbness and tingling in her left lower extremity.  X-rays in the emergency department showed a dislocated left patella and a left ankle fracture.      Orthopedics was consulted for evaluation of the ankle fracture.      Past Medical History:   Diagnosis Date    Asthma        History reviewed. No pertinent surgical history.    Review of patient's allergies indicates:   Allergen Reactions    Cat dander Shortness Of Breath       No current facility-administered medications for this encounter.     Current Outpatient Medications   Medication Sig    acetaminophen (TYLENOL) 500 MG tablet Take 2 tablets (1,000 mg total) by mouth every 8 (eight) hours.     albuterol (PROVENTIL) 2.5 mg /3 mL (0.083 %) nebulizer solution Take 3 mLs (2.5 mg total) by nebulization as needed for Wheezing.    albuterol (PROVENTIL/VENTOLIN HFA) 90 mcg/actuation inhaler Inhale 2 puffs into the lungs every 6 (six) hours as needed for Wheezing or Shortness of Breath. Rescue    aspirin (ECOTRIN) 81 MG EC tablet Take 1 tablet (81 mg total) by mouth 2 (two) times a day.    ergocalciferol (ERGOCALCIFEROL) 50,000 unit Cap Take 1 capsule (50,000 Units total) by mouth every 7 days.    ibuprofen (ADVIL,MOTRIN) 800 MG tablet Take 1 tablet (800 mg total) by mouth every 8 (eight) hours.    methocarbamoL (ROBAXIN) 750 MG Tab Take 1 tablet (750 mg total) by mouth every 8 (eight) hours. for 10 days    oxyCODONE (ROXICODONE) 5 MG immediate release tablet Take 1 tablet (5 mg total) by mouth every 4 (four) hours as needed for Pain.     Family History       Problem Relation (Age of Onset)    Cancer Other    Diabetes Other    Hypertension Mother, Maternal Aunt          Tobacco Use    Smoking status: Never    Smokeless tobacco: Never   Substance and Sexual Activity    Alcohol use: No    Drug use: No    Sexual activity: Not on file     ROS  Constitutional: Denies fever/chills   Neurological: Denies numbness/tingling (any exceptions noted in orthopaedic exam)    Psychiatric/Behavioral: Denies change in normal mood   Eyes: Denies change in vision   Cardiovascular: Denies chest pain   Respiratory: Denies shortness of breath   Hematologic/Lymphatic: Denies easy bleeding/bruising    Skin: Denies new rash or skin lesions    Gastrointestinal: Denies nausea/vomitting/diarrhea, change in bowel habits, abdominal pain    Allergic/Immunologic: Denies adverse reactions to current medications   Musculoskeletal: see HPI     Objective:     Vital Signs (Most Recent):  Temp: 98.8 °F (37.1 °C) (12/29/22 2133)  Pulse: 101 (12/29/22 2133)  Resp: 18 (12/29/22 2133)  BP: 130/75 (12/29/22 2133)  SpO2: 97 % (12/29/22 2133) Vital  Signs (24h Range):  Temp:  [98 °F (36.7 °C)-98.8 °F (37.1 °C)] 98.8 °F (37.1 °C)  Pulse:  [] 101  Resp:  [16-18] 18  SpO2:  [97 %-99 %] 97 %  BP: (130-144)/(71-90) 130/75     Weight: 104.3 kg (230 lb)     Body mass index is 34.97 kg/m².    Ortho/SPM Exam  General: no acute distress, appears stated age     Neuro: alert and oriented x3   Psych: normal mood   Head: normocephalic, atraumatic.    Eyes: no scleral icterus   Mouth: moist mucous membranes   Cardiovascular: extremities warm and well perfused   Lungs: breathing comfortably, equal chest rise bilat   Skin: clean, dry, intact (any exceptions noted in below musculoskeletal exam)    Musculoskeletal:  LUE:  - Skin intact throughout, no open wounds  - No swelling  - No ecchymosis, erythema, or signs of cellulitis  - NonTTP throughout  - AROM and PROM of the shoulder, elbow, wrist, and hand intact without pain  - Axillary/AIN/PIN/Radial/Median/Ulnar nerves assessed in isolation and are without deficit  - SILT throughout  - Compartments soft  - 2+ radial artery pulse  - Capillary Refill < 2 sec    RUE:  - Skin intact throughout, no open wounds  - No swelling  - No ecchymosis, erythema, or signs of cellulitis  - NonTTP throughout  - AROM and PROM of the shoulder, elbow, wrist, and hand intact without pain  - Axillary/AIN/PIN/Radial/Median/Ulnar nerves assessed in isolation and are without deficit  - SILT throughout  - Compartments soft  - 2+ radial artery pulse  - Capillary Refill < 2 sec    LLE:  - Skin intact throughout, no open wounds  - Mild edema and ecchymoses around the ankle  - No erythema or signs of cellulitis  - TTP around the ankle and apprehension with palpation around the patella, otherwise nonTTP throughout  - Patella laterally dislocated on initial evaluation. It reduced into the trochlea with minimal medial pressure.  - No pain with knee range of motion after patellar reduction  - ROM ankle not tested due to known fracture  - AROM and PROM of the  hip and foot intact without pain  - TA/EHL/Gastroc/FHL assessed in isolation and are without deficit  - SILT throughout  - Compartments soft  - 2+ DP and PT pulses  - Capillary Refill < 2 sec  - Negative Log roll    RLE:  - Skin intact throughout, no open wounds  - No swelling  - No ecchymosis, erythema, or signs of cellulitis  - NonTTP throughout  - AROM and PROM of the hip, knee, ankle, and foot intact without pain  - TA/EHL/Gastroc/FHL assessed in isolation and are without deficit  - SILT throughout  - Compartments soft  - 2+ DP and PT pulses  - Capillary Refill < 2 sec  - Negative Log roll    Spine/pelvis/axial body:  No tenderness to palpation of cervical, thoracic, or lumbar spine  No pain with compression of pelvis    Significant Labs: All pertinent labs within the past 24 hours have been reviewed.    Significant Imaging: I have reviewed and interpreted all pertinent imaging results/findings.  X-ray left ankle with a minimally displaced lateral malleolar fracture, minimally displaced posterior malleolar fracture, and subtle medial clear space widening.  Normal alignment of the tibiotalar joint.  Stress views of the ankle obtained with bedside fluoroscopy showed widening of the medial gutter with external rotation stress.  X-ray left knee with a lateral dislocation of the patella.  No fractures.    Assessment/Plan:     * Bimalleolar ankle fracture, left, closed, initial encounter  Magda Lowery is a 27 y.o. female with a left trimalleolar equivalent ankle fracture (lateral malleolus and posterior malleolus). She is closed and neurovascularly intact. She also has a left lateral patellar dislocation. The patella was reduced at bedside. The ankle was splinted at bedside. See procedure note below for additional details. Patient will require operative intervention within the next two weeks for her ankle fracture.    Plan:  Non weight bearing to the left lower extremity. Stay in splint and knee immobilizer.  Keep  splint clean, dry, and intact.  Pain control: multimodal - Tylenol, ibuprofen, Robaxin, oxycodone.  DVT PPx: ASA 81 mg BID.    Dispo: Follow up in 1 week for a skin check prior to ankle surgery    Procedure note: left ankle splinting  After time out was performed and patient ID, side, and site were verified, the fracture was stressed under live fluoroscopy and showed increased medial clear space widening. A short leg splint with stirrups was applied. A knee immobilizer was then applied for patellar stabilization. Post-reduction films were obtained which verified maintenance of the reduction. The patient tolerated the procedure well with no complications.        Jaycob Monaco MD  Orthopedics  [facility] - Emergency Dept

## 2022-12-30 NOTE — HPI
Magda Lowery is a 27 y.o. healthy female presenting to the emergency department after a ground level slip and fall earlier today.  She reports immediate left knee pain and left ankle pain.  She also reports that she dislocated her kneecap.  She reports this is her first time dislocating her kneecap.  She has been unable to bear weight on her left lower extremity since the fall.  She denies head trauma and loss of consciousness.  She denies pain outside of her left lower extremity.  She denies numbness and tingling in her left lower extremity.  X-rays in the emergency department showed a dislocated left patella and a left ankle fracture.      Orthopedics was consulted for evaluation of the ankle fracture.

## 2022-12-30 NOTE — PROVIDER PROGRESS NOTES - EMERGENCY DEPT.
Signout Note    I received signout from the previous provider.     Chief complaint:  Fall (Trip and fall in a parking lot, left knee dislocation and left ankle swelling--pt arrives splinted; denies hitting head; PMS intact, denies numbness/tingling)      Pertinent history &exam:  Magda Lowery is a 27 y.o. female who presented to emergency department with complaint of left knee and ankle pain following a mechanical fall.  Found to have left ankle bimalleolar fracture, and left patellar dislocation, which was reduced in the ED.  Signed out pending Orthopedic surgery recommendations.    Vitals:    12/29/22 2133   BP: 130/75   Pulse: 101   Resp: 18   Temp: 98.8 °F (37.1 °C)       Imaging Studies:    CT Knee Without Contrast Left   Final Result      No acute fractures identified.      Interval partial reduction of laterally dislocated patella which remains laterally subluxed and laterally tilted but intervally overall improved in position and alignment following partial reduction.      Suprapatellar effusion noted.         Electronically signed by: David Chapman MD   Date:    12/29/2022   Time:    22:46      CT 3D RECON WITH INDEPENDENT WS   Final Result      No acute fractures identified.      Interval partial reduction of laterally dislocated patella which remains laterally subluxed and laterally tilted but intervally overall improved in position and alignment following partial reduction.      Suprapatellar effusion noted.         Electronically signed by: David Chapman MD   Date:    12/29/2022   Time:    22:46      X-Ray Ankle Complete Left   Final Result      As above.         Electronically signed by: David Chapman MD   Date:    12/29/2022   Time:    21:59      X-Ray Ankle Complete Left   Final Result      Acute Knight B nondisplaced oblique fracture of the distal left fibula.  Nondisplaced oblique fracture of the posterior malleolus.  Slight widening of the medial aspect of the ankle mortise.      Lateral  dislocation of the patella.      No additional acute fracture or dislocation identified.         Electronically signed by: David Chapman MD   Date:    12/29/2022   Time:    20:16      X-Ray Femur Ap/Lat Left   Final Result      Acute Knight B nondisplaced oblique fracture of the distal left fibula.  Nondisplaced oblique fracture of the posterior malleolus.  Slight widening of the medial aspect of the ankle mortise.      Lateral dislocation of the patella.      No additional acute fracture or dislocation identified.         Electronically signed by: David Cahpman MD   Date:    12/29/2022   Time:    20:16      X-Ray Tibia Fibula 2 View Left   Final Result      Acute Knight B nondisplaced oblique fracture of the distal left fibula.  Nondisplaced oblique fracture of the posterior malleolus.  Slight widening of the medial aspect of the ankle mortise.      Lateral dislocation of the patella.      No additional acute fracture or dislocation identified.         Electronically signed by: David Chapman MD   Date:    12/29/2022   Time:    20:16      X-Ray Knee 3 View Left   Final Result      Acute Knight B nondisplaced oblique fracture of the distal left fibula.  Nondisplaced oblique fracture of the posterior malleolus.  Slight widening of the medial aspect of the ankle mortise.      Lateral dislocation of the patella.      No additional acute fracture or dislocation identified.         Electronically signed by: David Chapman MD   Date:    12/29/2022   Time:    20:16          Medications Given:  Medications   morphine injection 4 mg (4 mg Intravenous Given 12/29/22 1809)   morphine injection 4 mg (4 mg Intravenous Given 12/29/22 1924)   HYDROmorphone injection 1 mg (1 mg Intravenous Given 12/29/22 1956)       Pending Items/ MDM:  27 y.o. female with patellar dislocation and ankle fracture, signed out pending final Orthopedic surgery recommendations for final disposition.    She was splinted.  Plan nonweightbearing in the  left lower extremity.  Knee immobilizer was also placed.  Orthopedic surgery recommending discharge home with outpatient follow-up in their clinic.    Diagnostic Impression:    1. Patellar dislocation, unspecified laterality, initial encounter    2. Fall    3. Closed bimalleolar fracture of left ankle, initial encounter         ED Disposition Condition    Discharge Stable          ED Prescriptions       Medication Sig Dispense Start Date End Date Auth. Provider    aspirin (ECOTRIN) 81 MG EC tablet Take 1 tablet (81 mg total) by mouth 2 (two) times a day. 60 tablet 12/29/2022 1/28/2023 Jaycob Monaco MD    methocarbamoL (ROBAXIN) 750 MG Tab Take 1 tablet (750 mg total) by mouth every 8 (eight) hours. for 10 days 30 tablet 12/29/2022 1/8/2023 Jaycob Monaco MD    acetaminophen (TYLENOL) 500 MG tablet Take 2 tablets (1,000 mg total) by mouth every 8 (eight) hours. 60 tablet 12/29/2022 -- Jaycob Monaco MD    ibuprofen (ADVIL,MOTRIN) 800 MG tablet Take 1 tablet (800 mg total) by mouth every 8 (eight) hours. 30 tablet 12/29/2022 -- Jaycob Monaco MD    oxyCODONE (ROXICODONE) 5 MG immediate release tablet Take 1 tablet (5 mg total) by mouth every 4 (four) hours as needed for Pain. 20 tablet 12/29/2022 -- Jaycob Monaco MD          Follow-up Information       Follow up With Specialties Details Why Contact Info Additional Information    La Nena Ramirez MD Family Medicine Schedule an appointment as soon as possible for a visit   [address] [address] [phone]       [facility] Schedule an appointment as soon as possible for a visit   [address], [address]  [facility] [address] [phone] [facility] - Main Building, 5th Floor Please park in [facility] and take Atrium elevator            Fouzia Espinoza MD  Emergency Medicine

## 2022-12-30 NOTE — ASSESSMENT & PLAN NOTE
Magda Lowery is a 27 y.o. female with a left trimalleolar equivalent ankle fracture (lateral malleolus and posterior malleolus). She is closed and neurovascularly intact. She also has a left lateral patellar dislocation. The patella was reduced at bedside. The ankle was splinted at bedside. See procedure note below for additional details. Patient will require operative intervention within the next two weeks for her ankle fracture.    Plan:  Non weight bearing to the left lower extremity. Stay in splint and knee immobilizer.  Keep splint clean, dry, and intact.  Pain control: multimodal - Tylenol, ibuprofen, Robaxin, oxycodone.  DVT PPx: ASA 81 mg BID.    Dispo: Follow up in 1 week for a skin check prior to ankle surgery    Procedure note: left ankle splinting  After time out was performed and patient ID, side, and site were verified, the fracture was stressed under live fluoroscopy and showed increased medial clear space widening. A short leg splint with stirrups was applied. A knee immobilizer was then applied for patellar stabilization. Post-reduction films were obtained which verified maintenance of the reduction. The patient tolerated the procedure well with no complications.

## 2022-12-30 NOTE — CONSULTS
[facility] - Emergency Dept  Orthopedics  Consult Note    Patient Name: Magda Lowery  MRN: [id number]  Admission Date: 12/29/2022  Hospital Length of Stay: 0 days  Attending Provider: Sal Leigh MD  Primary Care Provider: La Nena Ramirez MD    Patient information was obtained from patient, parent and ER records.     Inpatient consult to Orthopedic Surgery  Consult performed by: Jaycob Monaco MD  Consult ordered by: Jagdish Bunch MD        Subjective:     Principal Problem:Bimalleolar ankle fracture, left, closed, initial encounter    Chief Complaint:   Chief Complaint   Patient presents with    Fall     Trip and fall in a parking lot, left knee dislocation and left ankle swelling--pt arrives splinted; denies hitting head; PMS intact, denies numbness/tingling        HPI: Magad Lowery is a 27 y.o. healthy female presenting to the emergency department after a ground level slip and fall earlier today.  She reports immediate left knee pain and left ankle pain.  She also reports that she dislocated her kneecap.  She reports this is her first time dislocating her kneecap.  She has been unable to bear weight on her left lower extremity since the fall.  She denies head trauma and loss of consciousness.  She denies pain outside of her left lower extremity.  She denies numbness and tingling in her left lower extremity.  X-rays in the emergency department showed a dislocated left patella and a left ankle fracture.      Orthopedics was consulted for evaluation of the ankle fracture.      Past Medical History:   Diagnosis Date    Asthma        History reviewed. No pertinent surgical history.    Review of patient's allergies indicates:   Allergen Reactions    Cat dander Shortness Of Breath       No current facility-administered medications for this encounter.     Current Outpatient Medications   Medication Sig    acetaminophen (TYLENOL) 500 MG tablet Take 2 tablets (1,000 mg total) by mouth every 8 (eight) hours.     albuterol (PROVENTIL) 2.5 mg /3 mL (0.083 %) nebulizer solution Take 3 mLs (2.5 mg total) by nebulization as needed for Wheezing.    albuterol (PROVENTIL/VENTOLIN HFA) 90 mcg/actuation inhaler Inhale 2 puffs into the lungs every 6 (six) hours as needed for Wheezing or Shortness of Breath. Rescue    aspirin (ECOTRIN) 81 MG EC tablet Take 1 tablet (81 mg total) by mouth 2 (two) times a day.    ergocalciferol (ERGOCALCIFEROL) 50,000 unit Cap Take 1 capsule (50,000 Units total) by mouth every 7 days.    ibuprofen (ADVIL,MOTRIN) 800 MG tablet Take 1 tablet (800 mg total) by mouth every 8 (eight) hours.    methocarbamoL (ROBAXIN) 750 MG Tab Take 1 tablet (750 mg total) by mouth every 8 (eight) hours. for 10 days    oxyCODONE (ROXICODONE) 5 MG immediate release tablet Take 1 tablet (5 mg total) by mouth every 4 (four) hours as needed for Pain.     Family History       Problem Relation (Age of Onset)    Cancer Other    Diabetes Other    Hypertension Mother, Maternal Aunt          Tobacco Use    Smoking status: Never    Smokeless tobacco: Never   Substance and Sexual Activity    Alcohol use: No    Drug use: No    Sexual activity: Not on file     ROS  Constitutional: Denies fever/chills   Neurological: Denies numbness/tingling (any exceptions noted in orthopaedic exam)    Psychiatric/Behavioral: Denies change in normal mood   Eyes: Denies change in vision   Cardiovascular: Denies chest pain   Respiratory: Denies shortness of breath   Hematologic/Lymphatic: Denies easy bleeding/bruising    Skin: Denies new rash or skin lesions    Gastrointestinal: Denies nausea/vomitting/diarrhea, change in bowel habits, abdominal pain    Allergic/Immunologic: Denies adverse reactions to current medications   Musculoskeletal: see HPI     Objective:     Vital Signs (Most Recent):  Temp: 98.8 °F (37.1 °C) (12/29/22 2133)  Pulse: 101 (12/29/22 2133)  Resp: 18 (12/29/22 2133)  BP: 130/75 (12/29/22 2133)  SpO2: 97 % (12/29/22 2133) Vital  Signs (24h Range):  Temp:  [98 °F (36.7 °C)-98.8 °F (37.1 °C)] 98.8 °F (37.1 °C)  Pulse:  [] 101  Resp:  [16-18] 18  SpO2:  [97 %-99 %] 97 %  BP: (130-144)/(71-90) 130/75     Weight: 104.3 kg (230 lb)     Body mass index is 34.97 kg/m².    Ortho/SPM Exam  General: no acute distress, appears stated age     Neuro: alert and oriented x3   Psych: normal mood   Head: normocephalic, atraumatic.    Eyes: no scleral icterus   Mouth: moist mucous membranes   Cardiovascular: extremities warm and well perfused   Lungs: breathing comfortably, equal chest rise bilat   Skin: clean, dry, intact (any exceptions noted in below musculoskeletal exam)    Musculoskeletal:  LUE:  - Skin intact throughout, no open wounds  - No swelling  - No ecchymosis, erythema, or signs of cellulitis  - NonTTP throughout  - AROM and PROM of the shoulder, elbow, wrist, and hand intact without pain  - Axillary/AIN/PIN/Radial/Median/Ulnar nerves assessed in isolation and are without deficit  - SILT throughout  - Compartments soft  - 2+ radial artery pulse  - Capillary Refill < 2 sec    RUE:  - Skin intact throughout, no open wounds  - No swelling  - No ecchymosis, erythema, or signs of cellulitis  - NonTTP throughout  - AROM and PROM of the shoulder, elbow, wrist, and hand intact without pain  - Axillary/AIN/PIN/Radial/Median/Ulnar nerves assessed in isolation and are without deficit  - SILT throughout  - Compartments soft  - 2+ radial artery pulse  - Capillary Refill < 2 sec    LLE:  - Skin intact throughout, no open wounds  - Mild edema and ecchymoses around the ankle  - No erythema or signs of cellulitis  - TTP around the ankle and apprehension with palpation around the patella, otherwise nonTTP throughout  - Patella laterally dislocated on initial evaluation. It reduced into the trochlea with minimal medial pressure.  - No pain with knee range of motion after patellar reduction  - ROM ankle not tested due to known fracture  - AROM and PROM of the  hip and foot intact without pain  - TA/EHL/Gastroc/FHL assessed in isolation and are without deficit  - SILT throughout  - Compartments soft  - 2+ DP and PT pulses  - Capillary Refill < 2 sec  - Negative Log roll    RLE:  - Skin intact throughout, no open wounds  - No swelling  - No ecchymosis, erythema, or signs of cellulitis  - NonTTP throughout  - AROM and PROM of the hip, knee, ankle, and foot intact without pain  - TA/EHL/Gastroc/FHL assessed in isolation and are without deficit  - SILT throughout  - Compartments soft  - 2+ DP and PT pulses  - Capillary Refill < 2 sec  - Negative Log roll    Spine/pelvis/axial body:  No tenderness to palpation of cervical, thoracic, or lumbar spine  No pain with compression of pelvis    Significant Labs: All pertinent labs within the past 24 hours have been reviewed.    Significant Imaging: I have reviewed and interpreted all pertinent imaging results/findings.  X-ray left ankle with a minimally displaced lateral malleolar fracture, minimally displaced posterior malleolar fracture, and subtle medial clear space widening.  Normal alignment of the tibiotalar joint.  Stress views of the ankle obtained with bedside fluoroscopy showed widening of the medial gutter with external rotation stress.  X-ray left knee with a lateral dislocation of the patella.  No fractures.    Assessment/Plan:     * Bimalleolar ankle fracture, left, closed, initial encounter  Magda Lowery is a 27 y.o. female with a left trimalleolar equivalent ankle fracture (lateral malleolus and posterior malleolus). She is closed and neurovascularly intact. She also has a left lateral patellar dislocation. The patella was reduced at bedside. The ankle was splinted at bedside. See procedure note below for additional details. Patient will require operative intervention within the next two weeks for her ankle fracture.    Plan:  Non weight bearing to the left lower extremity. Stay in splint and knee immobilizer.  Keep  splint clean, dry, and intact.  Pain control: multimodal - Tylenol, ibuprofen, Robaxin, oxycodone.  DVT PPx: ASA 81 mg BID.    Dispo: Follow up in 1 week for a skin check prior to ankle surgery    Procedure note: left ankle splinting  After time out was performed and patient ID, side, and site were verified, the fracture was stressed under live fluoroscopy and showed increased medial clear space widening. A short leg splint with stirrups was applied. A knee immobilizer was then applied for patellar stabilization. Post-reduction films were obtained which verified maintenance of the reduction. The patient tolerated the procedure well with no complications.        Jaycob Monaco MD  Orthopedics  [facility] - Emergency Dept

## 2022-12-30 NOTE — ED NOTES
Bed: [facility]  Expected date:   Expected time:   Means of arrival:   Comments:

## 2022-12-30 NOTE — ED TRIAGE NOTES
Magda Lowery, a 27 y.o. female presents to the ED w/ complaint of     Triage note:  Chief Complaint   Patient presents with    Fall     Trip and fall in a parking lot, left knee dislocation and left ankle swelling--pt arrives splinted; denies hitting head; PMS intact, denies numbness/tingling     Review of patient's allergies indicates:   Allergen Reactions    Cat dander Shortness Of Breath     Past Medical History:   Diagnosis Date    Asthma

## 2022-12-30 NOTE — ED PROVIDER NOTES
Encounter Date: 12/29/2022       History     Chief Complaint   Patient presents with    Fall     Trip and fall in a parking lot, left knee dislocation and left ankle swelling--pt arrives splinted; denies hitting head; PMS intact, denies numbness/tingling     27-year-old female with history of asthma presenting to the ED after slip and fall in the parking lot.  Happened approximately 2 hours prior to arrival.  EMS was called subsequently.  She was unable to get up, there was concern for left knee versus patellar dislocation.  In addition, patient was complaining of pain in her left ankle.  Denies any head, LOC, vomiting.  Denies any other pain.  Able to move her toes.  Denies any numbness.      Review of patient's allergies indicates:   Allergen Reactions    Cat dander Shortness Of Breath     Past Medical History:   Diagnosis Date    Asthma     Bimalleolar ankle fracture, left, closed, initial encounter 12/29/2022     History reviewed. No pertinent surgical history.  Family History   Problem Relation Age of Onset    Hypertension Mother     Hypertension Maternal Aunt     Diabetes Other     Cancer Other      Social History     Tobacco Use    Smoking status: Never    Smokeless tobacco: Never   Substance Use Topics    Alcohol use: No    Drug use: No     Review of Systems   Constitutional:  Negative for fever.   HENT:  Negative for sore throat.    Respiratory:  Negative for shortness of breath.    Cardiovascular:  Negative for chest pain.   Gastrointestinal:  Negative for abdominal pain, nausea and vomiting.   Genitourinary:  Negative for dysuria.   Musculoskeletal:  Positive for gait problem. Negative for back pain.   Skin:  Negative for rash.   Neurological:  Negative for dizziness, weakness, light-headedness and numbness.   Hematological:  Does not bruise/bleed easily.     Physical Exam     Initial Vitals [12/29/22 1539]   BP Pulse Resp Temp SpO2   (!) 144/90 82 16 98 °F (36.7 °C) 97 %      MAP       --          Physical Exam    Nursing note and vitals reviewed.  Constitutional: She appears well-developed and well-nourished. She is not diaphoretic. No distress.   HENT:   Head: Normocephalic and atraumatic.   Eyes: Conjunctivae and EOM are normal. Right eye exhibits no discharge. Left eye exhibits no discharge. No scleral icterus.   Neck:   Normal range of motion.  Cardiovascular:  Normal rate and regular rhythm.     Exam reveals no gallop and no friction rub.       No murmur heard.  Pulmonary/Chest: No respiratory distress. She has no wheezes. She has no rhonchi. She has no rales. She exhibits no tenderness.   Abdominal: Abdomen is soft. She exhibits no distension and no mass. There is no abdominal tenderness. There is no rebound and no guarding.   Musculoskeletal:      Cervical back: Normal range of motion.      Comments: Laterally displaced left patella.  Tenderness palpation of left ankle.  Able to moved toes     Neurological: She is alert. No sensory deficit.   No numbness of left lower leg   Skin: Skin is warm and dry. No erythema. No pallor.       ED Course   Procedures  Labs Reviewed   POCT URINE PREGNANCY          Imaging Results              CT Knee Without Contrast Left (Final result)  Result time 12/29/22 22:46:23      Final result by David Chapman MD (12/29/22 22:46:23)                   Impression:      No acute fractures identified.    Interval partial reduction of laterally dislocated patella which remains laterally subluxed and laterally tilted but intervally overall improved in position and alignment following partial reduction.    Suprapatellar effusion noted.      Electronically signed by: David Chapman MD  Date:    12/29/2022  Time:    22:46               Narrative:    EXAMINATION:  CT KNEE WITHOUT CONTRAST LEFT; CT 3D RECON WITH INDEPENDENT WS    CLINICAL HISTORY:  Knee trauma, tibial plateau fracture (Age >= 5y);; Knee trauma, tibial plateau fracture (Age >= 5y);fx;    TECHNIQUE:  Axial  0.625-mm images of the left knee obtained without intravenous contrast.  Data submitted for coronal and sagittal reformats.  Additional 3D reconstructed images obtained on independent workstation.    COMPARISON:  None    FINDINGS:  No acute fractures identified.  Interval partial reduction of laterally dislocated patella which remains laterally subluxed and laterally tilted but intervally overall improved in position and alignment following partial reduction.  Suprapatellar effusion noted.                                       CT 3D RECON WITH INDEPENDENT WS (Final result)  Result time 12/29/22 22:46:23      Final result by David Chapman MD (12/29/22 22:46:23)                   Impression:      No acute fractures identified.    Interval partial reduction of laterally dislocated patella which remains laterally subluxed and laterally tilted but intervally overall improved in position and alignment following partial reduction.    Suprapatellar effusion noted.      Electronically signed by: David Chapman MD  Date:    12/29/2022  Time:    22:46               Narrative:    EXAMINATION:  CT KNEE WITHOUT CONTRAST LEFT; CT 3D RECON WITH INDEPENDENT WS    CLINICAL HISTORY:  Knee trauma, tibial plateau fracture (Age >= 5y);; Knee trauma, tibial plateau fracture (Age >= 5y);fx;    TECHNIQUE:  Axial 0.625-mm images of the left knee obtained without intravenous contrast.  Data submitted for coronal and sagittal reformats.  Additional 3D reconstructed images obtained on independent workstation.    COMPARISON:  None    FINDINGS:  No acute fractures identified.  Interval partial reduction of laterally dislocated patella which remains laterally subluxed and laterally tilted but intervally overall improved in position and alignment following partial reduction.  Suprapatellar effusion noted.                                       X-Ray Ankle Complete Left (Final result)  Result time 12/29/22 21:59:08      Final result by David  DARREN Chapman MD (12/29/22 21:59:08)                   Impression:      As above.      Electronically signed by: David Chapman MD  Date:    12/29/2022  Time:    21:59               Narrative:    EXAMINATION:  XR ANKLE COMPLETE 3 VIEW LEFT    CLINICAL HISTORY:  Post reduction/splint;    TECHNIQUE:  AP, lateral and oblique views of the left ankle were performed.    COMPARISON:  Left ankle 12/29/2022 19:45 hours.    FINDINGS:  Stable position and alignment of previously described nondisplaced fractures of the posterior and lateral malleoli following closed reduction and posterior splinting.  Persistent slight widening of the medial mortise.                                       X-Ray Ankle Complete Left (Final result)  Result time 12/29/22 20:16:54      Final result by David Chapman MD (12/29/22 20:16:54)                   Impression:      Acute Knight B nondisplaced oblique fracture of the distal left fibula.  Nondisplaced oblique fracture of the posterior malleolus.  Slight widening of the medial aspect of the ankle mortise.    Lateral dislocation of the patella.    No additional acute fracture or dislocation identified.      Electronically signed by: David Chapman MD  Date:    12/29/2022  Time:    20:16               Narrative:    EXAMINATION:  XR ANKLE COMPLETE 3 VIEW LEFT; XR KNEE 3 VIEW LEFT; XR FEMUR 2 VIEW LEFT; XR TIBIA FIBULA 2 VIEW LEFT    CLINICAL HISTORY:  Unspecified fall, initial encounter    TECHNIQUE:  AP, lateral and oblique views of the left ankle were performed.  AP and lateral views of the left femur, tibia and fibula were performed.  AP, lateral and sunrise views of the left knee were performed.    COMPARISON:  None    FINDINGS:  Acute Knight B nondisplaced oblique fracture of the distal left fibula.  Nondisplaced oblique fracture posterior malleolus.  No additional acute fracture.  Lateral dislocation of the patella.  No additional dislocation.  Slight widening of the medial aspect of the  ankle mortise.  Talar dome is maintained.  No suprapatellar effusion identified.  Soft tissue swelling over the lateral malleolus.                                       X-Ray Femur Ap/Lat Left (Final result)  Result time 12/29/22 20:16:54      Final result by David Chapman MD (12/29/22 20:16:54)                   Impression:      Acute Knight B nondisplaced oblique fracture of the distal left fibula.  Nondisplaced oblique fracture of the posterior malleolus.  Slight widening of the medial aspect of the ankle mortise.    Lateral dislocation of the patella.    No additional acute fracture or dislocation identified.      Electronically signed by: David Chapman MD  Date:    12/29/2022  Time:    20:16               Narrative:    EXAMINATION:  XR ANKLE COMPLETE 3 VIEW LEFT; XR KNEE 3 VIEW LEFT; XR FEMUR 2 VIEW LEFT; XR TIBIA FIBULA 2 VIEW LEFT    CLINICAL HISTORY:  Unspecified fall, initial encounter    TECHNIQUE:  AP, lateral and oblique views of the left ankle were performed.  AP and lateral views of the left femur, tibia and fibula were performed.  AP, lateral and sunrise views of the left knee were performed.    COMPARISON:  None    FINDINGS:  Acute Knight B nondisplaced oblique fracture of the distal left fibula.  Nondisplaced oblique fracture posterior malleolus.  No additional acute fracture.  Lateral dislocation of the patella.  No additional dislocation.  Slight widening of the medial aspect of the ankle mortise.  Talar dome is maintained.  No suprapatellar effusion identified.  Soft tissue swelling over the lateral malleolus.                                       X-Ray Tibia Fibula 2 View Left (Final result)  Result time 12/29/22 20:16:54      Final result by David Chapman MD (12/29/22 20:16:54)                   Impression:      Acute Knight B nondisplaced oblique fracture of the distal left fibula.  Nondisplaced oblique fracture of the posterior malleolus.  Slight widening of the medial aspect of the  ankle mortise.    Lateral dislocation of the patella.    No additional acute fracture or dislocation identified.      Electronically signed by: David Chapman MD  Date:    12/29/2022  Time:    20:16               Narrative:    EXAMINATION:  XR ANKLE COMPLETE 3 VIEW LEFT; XR KNEE 3 VIEW LEFT; XR FEMUR 2 VIEW LEFT; XR TIBIA FIBULA 2 VIEW LEFT    CLINICAL HISTORY:  Unspecified fall, initial encounter    TECHNIQUE:  AP, lateral and oblique views of the left ankle were performed.  AP and lateral views of the left femur, tibia and fibula were performed.  AP, lateral and sunrise views of the left knee were performed.    COMPARISON:  None    FINDINGS:  Acute Knight B nondisplaced oblique fracture of the distal left fibula.  Nondisplaced oblique fracture posterior malleolus.  No additional acute fracture.  Lateral dislocation of the patella.  No additional dislocation.  Slight widening of the medial aspect of the ankle mortise.  Talar dome is maintained.  No suprapatellar effusion identified.  Soft tissue swelling over the lateral malleolus.                                       X-Ray Knee 3 View Left (Final result)  Result time 12/29/22 20:16:54      Final result by David Chapman MD (12/29/22 20:16:54)                   Impression:      Acute Knight B nondisplaced oblique fracture of the distal left fibula.  Nondisplaced oblique fracture of the posterior malleolus.  Slight widening of the medial aspect of the ankle mortise.    Lateral dislocation of the patella.    No additional acute fracture or dislocation identified.      Electronically signed by: David Chapman MD  Date:    12/29/2022  Time:    20:16               Narrative:    EXAMINATION:  XR ANKLE COMPLETE 3 VIEW LEFT; XR KNEE 3 VIEW LEFT; XR FEMUR 2 VIEW LEFT; XR TIBIA FIBULA 2 VIEW LEFT    CLINICAL HISTORY:  Unspecified fall, initial encounter    TECHNIQUE:  AP, lateral and oblique views of the left ankle were performed.  AP and lateral views of the left  femur, tibia and fibula were performed.  AP, lateral and sunrise views of the left knee were performed.    COMPARISON:  None    FINDINGS:  Acute Knight B nondisplaced oblique fracture of the distal left fibula.  Nondisplaced oblique fracture posterior malleolus.  No additional acute fracture.  Lateral dislocation of the patella.  No additional dislocation.  Slight widening of the medial aspect of the ankle mortise.  Talar dome is maintained.  No suprapatellar effusion identified.  Soft tissue swelling over the lateral malleolus.                                    X-Rays:   Independently Interpreted Readings:   Other Readings:  Fracture noted of left fibula, cortical irregularities concerning for possible tibial plateau fracture  Medications   morphine injection 4 mg (4 mg Intravenous Given 12/29/22 1809)   morphine injection 4 mg (4 mg Intravenous Given 12/29/22 1924)   HYDROmorphone injection 1 mg (1 mg Intravenous Given 12/29/22 1956)     Medical Decision Making:   History:   Old Medical Records: I decided to obtain old medical records.  Initial Assessment:   27-year-old female not on anticoagulation presenting to the ED after fall.  Unable to ambulate.  Has laterally displaced left patella as well as significant left ankle pain    Differential includes was not limited to patellar dislocation, patellar fracture, femur fracture, ankle fracture, tib-fib shaft fracture    X-ray showed concern for mildly displaced patella as well as bimalleolar ankle fracture.  CT knee showed no concern for patellar fracture or tibial plateau fracture Discussed with Orthopedics, they evaluated patient, placed patient in splint and knee immobilizer.  Will follow up in clinic with orthopedics.  Given prescription for aspirin, Robaxin, multimodal pain meds.  Independently Interpreted Test(s):   I have ordered and independently interpreted X-rays - see prior notes.  Clinical Tests:   Radiological Study: Ordered and Reviewed           ED  Course as of 12/30/22 0718   Thu Dec 29, 2022   1951 Discussed with orthopedics [DC]      ED Course User Index  [DC] Sal Leigh MD                 Clinical Impression:   Final diagnoses:  [W19.XXXA] Fall  [S83.006A] Patellar dislocation, unspecified laterality, initial encounter (Primary)  [S82.842A] Closed bimalleolar fracture of left ankle, initial encounter        ED Disposition Condition    Discharge Stable          ED Prescriptions       Medication Sig Dispense Start Date End Date Auth. Provider    aspirin (ECOTRIN) 81 MG EC tablet Take 1 tablet (81 mg total) by mouth 2 (two) times a day. 60 tablet 12/29/2022 1/28/2023 Jaycob Monaco MD    methocarbamoL (ROBAXIN) 750 MG Tab Take 1 tablet (750 mg total) by mouth every 8 (eight) hours. for 10 days 30 tablet 12/29/2022 1/8/2023 Jaycob Monaco MD    acetaminophen (TYLENOL) 500 MG tablet Take 2 tablets (1,000 mg total) by mouth every 8 (eight) hours. 60 tablet 12/29/2022 -- Jaycob Monaco MD    ibuprofen (ADVIL,MOTRIN) 800 MG tablet Take 1 tablet (800 mg total) by mouth every 8 (eight) hours. 30 tablet 12/29/2022 -- Jaycob Monaco MD    oxyCODONE (ROXICODONE) 5 MG immediate release tablet Take 1 tablet (5 mg total) by mouth every 4 (four) hours as needed for Pain. 20 tablet 12/29/2022 -- Jaycob Monaco MD          Follow-up Information       Follow up With Specialties Details Why Contact Info Additional Information    La Nena Ramirez MD Family Medicine Schedule an appointment as soon as possible for a visit   [address] [facility] [address] [phone]       Michael jaquelin - Orthopedics [facility] Orthopedics Schedule an appointment as soon as possible for a visit   [address], [address]  [facility] [address] [phone] [facility] - Main Building, 5th Floor Please park in [facility] and take At[address] elevator             Jagdish Bunch MD  Resident  12/30/22 0918

## 2022-12-30 NOTE — SUBJECTIVE & OBJECTIVE
Past Medical History:   Diagnosis Date    Asthma        History reviewed. No pertinent surgical history.    Review of patient's allergies indicates:   Allergen Reactions    Cat dander Shortness Of Breath       No current facility-administered medications for this encounter.     Current Outpatient Medications   Medication Sig    acetaminophen (TYLENOL) 500 MG tablet Take 2 tablets (1,000 mg total) by mouth every 8 (eight) hours.    albuterol (PROVENTIL) 2.5 mg /3 mL (0.083 %) nebulizer solution Take 3 mLs (2.5 mg total) by nebulization as needed for Wheezing.    albuterol (PROVENTIL/VENTOLIN HFA) 90 mcg/actuation inhaler Inhale 2 puffs into the lungs every 6 (six) hours as needed for Wheezing or Shortness of Breath. Rescue    aspirin (ECOTRIN) 81 MG EC tablet Take 1 tablet (81 mg total) by mouth 2 (two) times a day.    ergocalciferol (ERGOCALCIFEROL) 50,000 unit Cap Take 1 capsule (50,000 Units total) by mouth every 7 days.    ibuprofen (ADVIL,MOTRIN) 800 MG tablet Take 1 tablet (800 mg total) by mouth every 8 (eight) hours.    methocarbamoL (ROBAXIN) 750 MG Tab Take 1 tablet (750 mg total) by mouth every 8 (eight) hours. for 10 days    oxyCODONE (ROXICODONE) 5 MG immediate release tablet Take 1 tablet (5 mg total) by mouth every 4 (four) hours as needed for Pain.     Family History       Problem Relation (Age of Onset)    Cancer Other    Diabetes Other    Hypertension Mother, Maternal Aunt          Tobacco Use    Smoking status: Never    Smokeless tobacco: Never   Substance and Sexual Activity    Alcohol use: No    Drug use: No    Sexual activity: Not on file     ROS  Constitutional: Denies fever/chills   Neurological: Denies numbness/tingling (any exceptions noted in orthopaedic exam)    Psychiatric/Behavioral: Denies change in normal mood   Eyes: Denies change in vision   Cardiovascular: Denies chest pain   Respiratory: Denies shortness of breath   Hematologic/Lymphatic: Denies easy bleeding/bruising    Skin:  Denies new rash or skin lesions    Gastrointestinal: Denies nausea/vomitting/diarrhea, change in bowel habits, abdominal pain    Allergic/Immunologic: Denies adverse reactions to current medications   Musculoskeletal: see HPI     Objective:     Vital Signs (Most Recent):  Temp: 98.8 °F (37.1 °C) (12/29/22 2133)  Pulse: 101 (12/29/22 2133)  Resp: 18 (12/29/22 2133)  BP: 130/75 (12/29/22 2133)  SpO2: 97 % (12/29/22 2133) Vital Signs (24h Range):  Temp:  [98 °F (36.7 °C)-98.8 °F (37.1 °C)] 98.8 °F (37.1 °C)  Pulse:  [] 101  Resp:  [16-18] 18  SpO2:  [97 %-99 %] 97 %  BP: (130-144)/(71-90) 130/75     Weight: 104.3 kg (230 lb)     Body mass index is 34.97 kg/m².    Ortho/SPM Exam  General: no acute distress, appears stated age     Neuro: alert and oriented x3   Psych: normal mood   Head: normocephalic, atraumatic.    Eyes: no scleral icterus   Mouth: moist mucous membranes   Cardiovascular: extremities warm and well perfused   Lungs: breathing comfortably, equal chest rise bilat   Skin: clean, dry, intact (any exceptions noted in below musculoskeletal exam)    Musculoskeletal:  LUE:  - Skin intact throughout, no open wounds  - No swelling  - No ecchymosis, erythema, or signs of cellulitis  - NonTTP throughout  - AROM and PROM of the shoulder, elbow, wrist, and hand intact without pain  - Axillary/AIN/PIN/Radial/Median/Ulnar nerves assessed in isolation and are without deficit  - SILT throughout  - Compartments soft  - 2+ radial artery pulse  - Capillary Refill < 2 sec    RUE:  - Skin intact throughout, no open wounds  - No swelling  - No ecchymosis, erythema, or signs of cellulitis  - NonTTP throughout  - AROM and PROM of the shoulder, elbow, wrist, and hand intact without pain  - Axillary/AIN/PIN/Radial/Median/Ulnar nerves assessed in isolation and are without deficit  - SILT throughout  - Compartments soft  - 2+ radial artery pulse  - Capillary Refill < 2 sec    LLE:  - Skin intact throughout, no open wounds  -  Mild edema and ecchymoses around the ankle  - No erythema or signs of cellulitis  - TTP around the ankle and apprehension with palpation around the patella, otherwise nonTTP throughout  - Patella laterally dislocated on initial evaluation. It reduced into the trochlea with minimal medial pressure.  - No pain with knee range of motion after patellar reduction  - ROM ankle not tested due to known fracture  - AROM and PROM of the hip and foot intact without pain  - TA/EHL/Gastroc/FHL assessed in isolation and are without deficit  - SILT throughout  - Compartments soft  - 2+ DP and PT pulses  - Capillary Refill < 2 sec  - Negative Log roll    RLE:  - Skin intact throughout, no open wounds  - No swelling  - No ecchymosis, erythema, or signs of cellulitis  - NonTTP throughout  - AROM and PROM of the hip, knee, ankle, and foot intact without pain  - TA/EHL/Gastroc/FHL assessed in isolation and are without deficit  - SILT throughout  - Compartments soft  - 2+ DP and PT pulses  - Capillary Refill < 2 sec  - Negative Log roll    Spine/pelvis/axial body:  No tenderness to palpation of cervical, thoracic, or lumbar spine  No pain with compression of pelvis    Significant Labs: All pertinent labs within the past 24 hours have been reviewed.    Significant Imaging: I have reviewed and interpreted all pertinent imaging results/findings.  X-ray left ankle with a minimally displaced lateral malleolar fracture, minimally displaced posterior malleolar fracture, and subtle medial clear space widening.  Normal alignment of the tibiotalar joint.  Stress views of the ankle obtained with bedside fluoroscopy showed widening of the medial gutter with external rotation stress.  X-ray left knee with a lateral dislocation of the patella.  No fractures.

## 2023-01-01 ENCOUNTER — PATIENT MESSAGE (OUTPATIENT)
Dept: ORTHOPEDICS | Facility: CLINIC | Age: 28
End: 2023-01-01
Payer: COMMERCIAL

## 2023-01-01 ENCOUNTER — PATIENT MESSAGE (OUTPATIENT)
Dept: FAMILY MEDICINE | Facility: CLINIC | Age: 28
End: 2023-01-01
Payer: COMMERCIAL

## 2023-01-03 DIAGNOSIS — S82.842A BIMALLEOLAR ANKLE FRACTURE, LEFT, CLOSED, INITIAL ENCOUNTER: Primary | ICD-10-CM

## 2023-01-04 ENCOUNTER — OFFICE VISIT (OUTPATIENT)
Dept: ORTHOPEDICS | Facility: CLINIC | Age: 28
End: 2023-01-04
Payer: COMMERCIAL

## 2023-01-04 VITALS — WEIGHT: 229.94 LBS | BODY MASS INDEX: 34.85 KG/M2 | HEIGHT: 68 IN

## 2023-01-04 DIAGNOSIS — S82.842A BIMALLEOLAR ANKLE FRACTURE, LEFT, CLOSED, INITIAL ENCOUNTER: Primary | ICD-10-CM

## 2023-01-04 PROCEDURE — 3008F PR BODY MASS INDEX (BMI) DOCUMENTED: ICD-10-PCS | Mod: CPTII,S$GLB,, | Performed by: NURSE PRACTITIONER

## 2023-01-04 PROCEDURE — 1160F PR REVIEW ALL MEDS BY PRESCRIBER/CLIN PHARMACIST DOCUMENTED: ICD-10-PCS | Mod: CPTII,S$GLB,, | Performed by: NURSE PRACTITIONER

## 2023-01-04 PROCEDURE — 99999 PR PBB SHADOW E&M-EST. PATIENT-LVL III: CPT | Mod: PBBFAC,,, | Performed by: NURSE PRACTITIONER

## 2023-01-04 PROCEDURE — 1159F PR MEDICATION LIST DOCUMENTED IN MEDICAL RECORD: ICD-10-PCS | Mod: CPTII,S$GLB,, | Performed by: NURSE PRACTITIONER

## 2023-01-04 PROCEDURE — 99999 PR PBB SHADOW E&M-EST. PATIENT-LVL III: ICD-10-PCS | Mod: PBBFAC,,, | Performed by: NURSE PRACTITIONER

## 2023-01-04 PROCEDURE — 1159F MED LIST DOCD IN RCRD: CPT | Mod: CPTII,S$GLB,, | Performed by: NURSE PRACTITIONER

## 2023-01-04 PROCEDURE — 99205 OFFICE O/P NEW HI 60 MIN: CPT | Mod: S$GLB,,, | Performed by: NURSE PRACTITIONER

## 2023-01-04 PROCEDURE — 3008F BODY MASS INDEX DOCD: CPT | Mod: CPTII,S$GLB,, | Performed by: NURSE PRACTITIONER

## 2023-01-04 PROCEDURE — 1160F RVW MEDS BY RX/DR IN RCRD: CPT | Mod: CPTII,S$GLB,, | Performed by: NURSE PRACTITIONER

## 2023-01-04 PROCEDURE — 99205 PR OFFICE/OUTPT VISIT, NEW, LEVL V, 60-74 MIN: ICD-10-PCS | Mod: S$GLB,,, | Performed by: NURSE PRACTITIONER

## 2023-01-05 ENCOUNTER — TELEPHONE (OUTPATIENT)
Dept: ORTHOPEDICS | Facility: CLINIC | Age: 28
End: 2023-01-05
Payer: COMMERCIAL

## 2023-01-05 ENCOUNTER — ANESTHESIA EVENT (OUTPATIENT)
Dept: SURGERY | Facility: HOSPITAL | Age: 28
End: 2023-01-05
Payer: COMMERCIAL

## 2023-01-05 NOTE — PROGRESS NOTES
Subjective:      Patient ID: Magda Lowery is a 27 y.o. female.    Chief Complaint: Pain of the Left Ankle    Magda Lowery is a 27 y.o. healthy female presenting to Orthopedics following recent ED visit for an ankle fracture.  Patient reports she slipped and fell while walking to her car and broke her left ankle and dislocated her knee cap.  She states her patella was reduced in the ED and she was placed into a knee immobilizer and her ankle was splinted.  She states she was told to follow up with Orthopedics for a skin check as her ankle fracture would require surgical intervention.       Currently, she reports her pain is well controlled with prescribed medication.  She has avoided any weight bearing to her LLE as instructed.  She has elevated but has not used any ice.  She denies numbness and tingling in her left lower extremity.         Review of Systems   Musculoskeletal:         Left patella dislocation and left ankle fracture   All other systems reviewed and are negative.      Objective:        General    Vitals reviewed.  Constitutional: She is oriented to person, place, and time. She appears well-developed and well-nourished. No distress.   HENT:   Head: Normocephalic and atraumatic.   Eyes: Conjunctivae are normal. Pupils are equal, round, and reactive to light.   Neck: Neck supple.   Cardiovascular:  Intact distal pulses.            Pulmonary/Chest: Effort normal.   Neurological: She is alert and oriented to person, place, and time. She has normal reflexes.   Psychiatric: She has a normal mood and affect. Her behavior is normal. Judgment and thought content normal.         Left Ankle/Foot Exam     Inspection  Bruising: Ankle - present Foot - present    Comments:  Minimal swelling to lateral/medial ankle.  Skin wrinkles.  No fracture blisters present.      Left Knee Exam     Inspection   Swelling: absent    Tenderness   The patient tender to palpation of the patella.    Other   Sensation:  normal    Vascular Exam       Left Pulses  Dorsalis Pedis:      2+  Posterior Tibial:      2+      RADS:  None   Reviewed Xray of her left ankle, left femur, left tib/fib and CT of left knee dated 12/29/22.      Assessment:       Encounter Diagnosis   Name Primary?    Bimalleolar ankle fracture, left, closed, initial encounter Yes          Plan:       Magda was seen today for pain.    Diagnoses and all orders for this visit:    Bimalleolar ankle fracture, left, closed, initial encounter  -     COMMODE FOR HOME USE    Patient presents to Orthopedics for skin check in preparation for ORIF for her left ankle fracture.  Plan is for ORIF on 1-6-23 with Dr. Polanco.  Splint removed, skin wrinkles and patient is ready to proceed with planned surgical intervention.  Will place her back into a posterior splint with stirrups and her knee immobilizer will be reapplied.      Patient has been given a wheelchair and order placed for a bedside commode.      She will remain NWB to her LLE.    NPO after MN on 1-6-23.  Consents signed in clinic and case has been booked.  She will continue using multimodal for pain control, currently on OxyIR, Robaxin, Tylenol, Motrin, and ASA 81 mg bid.    Pre, raheem, and post-operative procedure and expectations were discussed.  Questions were answered. The patient has been educated and is ready to proceed with surgery.  Approximately 30 minutes was spent discussing surgical outcomes, plans, procedures, pre, raheem, and post-operative expectations and care. The risks, benefits and alternatives to surgery were discussed with the patient at great length.  These include bleeding, infection, vessel/nerve damage, pain, numbness, tingling, complex regional pain syndrome, hardware/surgical failure, need for further surgery, malunion, nonunion, DVT, PE, arthritis and death.     Magda also understands that the risks of surgery may be greater for some patients due to health or lifestyle issues, such as a current  condition or a history of heart disease, obesity, clotting disorders, recurrent infections, smoking, sedentary lifestyle, or noncompliance with medications, therapy, or follow-up. The degree of the increased risk is hard to estimate with any degree of precision.      Patient states an understanding and wishes to proceed with surgery.   All questions were answered.  No guarantees were implied or stated.  Informed consent was obtained.  The patient will contact us if they have any questions, concerns, and changes in their medical condition prior to surgery.

## 2023-01-05 NOTE — TELEPHONE ENCOUNTER
----- Message from Sally Dsouza sent at 1/5/2023  3:30 PM CST -----  Contact: [phone]  Patient, needs someone to call her and give her a definitve, time from her Surgery, please    Call [phone]

## 2023-01-05 NOTE — TELEPHONE ENCOUNTER
Spoke with pt.  Advised NPO after midnight   Arrival time of 5 am tomorrow at [facility].  Pt verbalized understanding

## 2023-01-06 ENCOUNTER — ANESTHESIA (OUTPATIENT)
Dept: SURGERY | Facility: HOSPITAL | Age: 28
End: 2023-01-06
Payer: COMMERCIAL

## 2023-01-06 ENCOUNTER — HOSPITAL ENCOUNTER (OUTPATIENT)
Facility: HOSPITAL | Age: 28
Discharge: HOME | End: 2023-01-06
Attending: ORTHOPAEDIC SURGERY | Admitting: ORTHOPAEDIC SURGERY
Payer: COMMERCIAL

## 2023-01-06 VITALS
WEIGHT: 229 LBS | RESPIRATION RATE: 20 BRPM | TEMPERATURE: 98 F | SYSTOLIC BLOOD PRESSURE: 127 MMHG | HEIGHT: 68 IN | HEART RATE: 91 BPM | DIASTOLIC BLOOD PRESSURE: 79 MMHG | BODY MASS INDEX: 34.71 KG/M2 | OXYGEN SATURATION: 100 %

## 2023-01-06 DIAGNOSIS — S82.842A BIMALLEOLAR ANKLE FRACTURE, LEFT, CLOSED, INITIAL ENCOUNTER: Primary | ICD-10-CM

## 2023-01-06 LAB
B-HCG UR QL: NEGATIVE
CTP QC/QA: YES

## 2023-01-06 PROCEDURE — 63600175 PHARM REV CODE 636 W HCPCS: Performed by: STUDENT IN AN ORGANIZED HEALTH CARE EDUCATION/TRAINING PROGRAM

## 2023-01-06 PROCEDURE — 36000708 HC OR TIME LEV III 1ST 15 MIN: Performed by: ORTHOPAEDIC SURGERY

## 2023-01-06 PROCEDURE — C1769 GUIDE WIRE: HCPCS | Performed by: ORTHOPAEDIC SURGERY

## 2023-01-06 PROCEDURE — 27814 TREATMENT OF ANKLE FRACTURE: CPT | Mod: LT,,, | Performed by: ORTHOPAEDIC SURGERY

## 2023-01-06 PROCEDURE — 64450 LEFT POPLITEAL SINGLE INJECTION: ICD-10-PCS | Mod: 59,LT,, | Performed by: SURGERY

## 2023-01-06 PROCEDURE — 63600175 PHARM REV CODE 636 W HCPCS: Performed by: NURSE ANESTHETIST, CERTIFIED REGISTERED

## 2023-01-06 PROCEDURE — 25000003 PHARM REV CODE 250

## 2023-01-06 PROCEDURE — 27201423 OPTIME MED/SURG SUP & DEVICES STERILE SUPPLY: Performed by: ORTHOPAEDIC SURGERY

## 2023-01-06 PROCEDURE — 37000008 HC ANESTHESIA 1ST 15 MINUTES: Performed by: ORTHOPAEDIC SURGERY

## 2023-01-06 PROCEDURE — 63600175 PHARM REV CODE 636 W HCPCS

## 2023-01-06 PROCEDURE — 25000003 PHARM REV CODE 250: Performed by: NURSE ANESTHETIST, CERTIFIED REGISTERED

## 2023-01-06 PROCEDURE — C1713 ANCHOR/SCREW BN/BN,TIS/BN: HCPCS | Performed by: ORTHOPAEDIC SURGERY

## 2023-01-06 PROCEDURE — D9220A PRA ANESTHESIA: Mod: CRNA,,, | Performed by: NURSE ANESTHETIST, CERTIFIED REGISTERED

## 2023-01-06 PROCEDURE — 71000045 HC DOSC ROUTINE RECOVERY EA ADD'L HR: Performed by: ORTHOPAEDIC SURGERY

## 2023-01-06 PROCEDURE — 25000003 PHARM REV CODE 250: Performed by: SURGERY

## 2023-01-06 PROCEDURE — 81025 URINE PREGNANCY TEST: CPT | Performed by: ORTHOPAEDIC SURGERY

## 2023-01-06 PROCEDURE — 76942 ECHO GUIDE FOR BIOPSY: CPT | Performed by: STUDENT IN AN ORGANIZED HEALTH CARE EDUCATION/TRAINING PROGRAM

## 2023-01-06 PROCEDURE — 71000015 HC POSTOP RECOV 1ST HR: Performed by: ORTHOPAEDIC SURGERY

## 2023-01-06 PROCEDURE — D9220A PRA ANESTHESIA: ICD-10-PCS | Mod: ANES,,, | Performed by: ANESTHESIOLOGY

## 2023-01-06 PROCEDURE — 64447 NJX AA&/STRD FEMORAL NRV IMG: CPT | Mod: 59,LT,, | Performed by: SURGERY

## 2023-01-06 PROCEDURE — D9220A PRA ANESTHESIA: ICD-10-PCS | Mod: CRNA,,, | Performed by: NURSE ANESTHETIST, CERTIFIED REGISTERED

## 2023-01-06 PROCEDURE — 71000044 HC DOSC ROUTINE RECOVERY FIRST HOUR: Performed by: ORTHOPAEDIC SURGERY

## 2023-01-06 PROCEDURE — D9220A PRA ANESTHESIA: Mod: ANES,,, | Performed by: ANESTHESIOLOGY

## 2023-01-06 PROCEDURE — 64450 NJX AA&/STRD OTHER PN/BRANCH: CPT | Mod: 59,LT,, | Performed by: SURGERY

## 2023-01-06 PROCEDURE — 27814 PR OPEN TREATMENT BIMALLEOLAR ANKLE FRACTURE: ICD-10-PCS | Mod: LT,,, | Performed by: ORTHOPAEDIC SURGERY

## 2023-01-06 PROCEDURE — 36000709 HC OR TIME LEV III EA ADD 15 MIN: Performed by: ORTHOPAEDIC SURGERY

## 2023-01-06 PROCEDURE — 64447 LEFT ADDUCTOR CANAL SINGLE INJECTION: ICD-10-PCS | Mod: 59,LT,, | Performed by: SURGERY

## 2023-01-06 PROCEDURE — 37000009 HC ANESTHESIA EA ADD 15 MINS: Performed by: ORTHOPAEDIC SURGERY

## 2023-01-06 DEVICE — IMPLANTABLE DEVICE: Type: IMPLANTABLE DEVICE | Site: ANKLE | Status: FUNCTIONAL

## 2023-01-06 DEVICE — PLATE TUBULAR 1/3 73MM 6HOLE: Type: IMPLANTABLE DEVICE | Site: ANKLE | Status: FUNCTIONAL

## 2023-01-06 RX ORDER — MIDAZOLAM HYDROCHLORIDE 1 MG/ML
.5-4 INJECTION INTRAMUSCULAR; INTRAVENOUS
Status: DISCONTINUED | OUTPATIENT
Start: 2023-01-06 | End: 2023-01-06 | Stop reason: HOSPADM

## 2023-01-06 RX ORDER — PROPOFOL 10 MG/ML
VIAL (ML) INTRAVENOUS
Status: DISCONTINUED | OUTPATIENT
Start: 2023-01-06 | End: 2023-01-06

## 2023-01-06 RX ORDER — BUPIVACAINE HYDROCHLORIDE 5 MG/ML
INJECTION, SOLUTION EPIDURAL; INTRACAUDAL
Status: COMPLETED | OUTPATIENT
Start: 2023-01-06 | End: 2023-01-06

## 2023-01-06 RX ORDER — ONDANSETRON 2 MG/ML
INJECTION INTRAMUSCULAR; INTRAVENOUS
Status: DISCONTINUED | OUTPATIENT
Start: 2023-01-06 | End: 2023-01-06

## 2023-01-06 RX ORDER — CLINDAMYCIN PHOSPHATE 900 MG/50ML
INJECTION, SOLUTION INTRAVENOUS
Status: DISCONTINUED | OUTPATIENT
Start: 2023-01-06 | End: 2023-01-06

## 2023-01-06 RX ORDER — MUPIROCIN 20 MG/G
OINTMENT TOPICAL 2 TIMES DAILY
Status: DISCONTINUED | OUTPATIENT
Start: 2023-01-06 | End: 2023-01-06 | Stop reason: HOSPADM

## 2023-01-06 RX ORDER — SODIUM CHLORIDE 0.9 % (FLUSH) 0.9 %
3 SYRINGE (ML) INJECTION
Status: DISCONTINUED | OUTPATIENT
Start: 2023-01-06 | End: 2023-01-06 | Stop reason: HOSPADM

## 2023-01-06 RX ORDER — LIDOCAINE HYDROCHLORIDE 10 MG/ML
INJECTION, SOLUTION EPIDURAL; INFILTRATION; INTRACAUDAL; PERINEURAL
Status: DISCONTINUED
Start: 2023-01-06 | End: 2023-01-06 | Stop reason: HOSPADM

## 2023-01-06 RX ORDER — SODIUM CHLORIDE 9 MG/ML
INJECTION, SOLUTION INTRAVENOUS CONTINUOUS
Status: DISCONTINUED | OUTPATIENT
Start: 2023-01-06 | End: 2023-01-06 | Stop reason: HOSPADM

## 2023-01-06 RX ORDER — FENTANYL CITRATE 50 UG/ML
25-200 INJECTION, SOLUTION INTRAMUSCULAR; INTRAVENOUS
Status: DISCONTINUED | OUTPATIENT
Start: 2023-01-06 | End: 2023-01-06 | Stop reason: HOSPADM

## 2023-01-06 RX ORDER — KETAMINE HCL IN 0.9 % NACL 50 MG/5 ML
SYRINGE (ML) INTRAVENOUS
Status: DISCONTINUED | OUTPATIENT
Start: 2023-01-06 | End: 2023-01-06

## 2023-01-06 RX ORDER — FENTANYL CITRATE 50 UG/ML
25 INJECTION, SOLUTION INTRAMUSCULAR; INTRAVENOUS EVERY 5 MIN PRN
Status: DISCONTINUED | OUTPATIENT
Start: 2023-01-06 | End: 2023-01-06 | Stop reason: HOSPADM

## 2023-01-06 RX ORDER — MUPIROCIN 20 MG/G
OINTMENT TOPICAL
Status: DISCONTINUED | OUTPATIENT
Start: 2023-01-06 | End: 2023-01-06 | Stop reason: HOSPADM

## 2023-01-06 RX ORDER — PROPOFOL 10 MG/ML
VIAL (ML) INTRAVENOUS CONTINUOUS PRN
Status: DISCONTINUED | OUTPATIENT
Start: 2023-01-06 | End: 2023-01-06

## 2023-01-06 RX ADMIN — MIDAZOLAM 2 MG: 1 INJECTION INTRAMUSCULAR; INTRAVENOUS at 06:01

## 2023-01-06 RX ADMIN — PROPOFOL 100 MG: 10 INJECTION, EMULSION INTRAVENOUS at 07:01

## 2023-01-06 RX ADMIN — CLINDAMYCIN IN 5 PERCENT DEXTROSE 900 MG: 18 INJECTION, SOLUTION INTRAVENOUS at 07:01

## 2023-01-06 RX ADMIN — BUPIVACAINE HYDROCHLORIDE 20 ML: 5 INJECTION, SOLUTION EPIDURAL; INTRACAUDAL at 06:01

## 2023-01-06 RX ADMIN — BUPIVACAINE HYDROCHLORIDE 30 ML: 5 INJECTION, SOLUTION EPIDURAL; INTRACAUDAL; PERINEURAL at 06:01

## 2023-01-06 RX ADMIN — ONDANSETRON 4 MG: 2 INJECTION INTRAMUSCULAR; INTRAVENOUS at 08:01

## 2023-01-06 RX ADMIN — Medication 150 MCG/KG/MIN: at 07:01

## 2023-01-06 RX ADMIN — FENTANYL CITRATE 100 MCG: 50 INJECTION INTRAMUSCULAR; INTRAVENOUS at 06:01

## 2023-01-06 RX ADMIN — Medication 30 MG: at 07:01

## 2023-01-06 RX ADMIN — SODIUM CHLORIDE: 0.9 INJECTION, SOLUTION INTRAVENOUS at 07:01

## 2023-01-06 RX ADMIN — CEFAZOLIN 2 G: 2 INJECTION, POWDER, FOR SOLUTION INTRAMUSCULAR; INTRAVENOUS at 07:01

## 2023-01-06 RX ADMIN — Medication 20 MG: at 07:01

## 2023-01-06 RX ADMIN — MUPIROCIN: 20 OINTMENT TOPICAL at 06:01

## 2023-01-06 NOTE — DISCHARGE SUMMARY
[facility] - [facility] (2nd Fl)  Discharge Note  Short Stay    Procedure(s) (LRB):  ORIF, ANKLE, LEFT (Left)      OUTCOME: Patient tolerated treatment/procedure well without complication and is now ready for discharge.    DISPOSITION: Home or Self Care    FINAL DIAGNOSIS:  Bimalleolar ankle fracture, left, closed, initial encounter    FOLLOWUP: In clinic    DISCHARGE INSTRUCTIONS:    Discharge Procedure Orders   Diet general     Call MD for:  temperature >100.4     Call MD for:  persistent nausea and vomiting     Call MD for:  severe uncontrolled pain     Call MD for:  difficulty breathing, headache or visual disturbances     Call MD for:  redness, tenderness, or signs of infection (pain, swelling, redness, odor or green/yellow discharge around incision site)     Call MD for:  hives     Call MD for:  persistent dizziness or light-headedness     Call MD for:  extreme fatigue     No driving, operating heavy equipment or signing legal documents while taking pain medication     Leave dressing on - Keep it clean, dry, and intact until clinic visit     Keep surgical extremity elevated     Ice to affected area     Non weight bearing        TIME SPENT ON DISCHARGE: 10 minutes

## 2023-01-06 NOTE — ANESTHESIA PROCEDURE NOTES
Left Popliteal Single Injection    Patient location during procedure: pre-op   Block not for primary anesthetic.  Reason for block: at surgeon's request and post-op pain management   Post-op Pain Location: Left Ankle   Start time: 1/6/2023 6:32 AM  Timeout: 1/6/2023 6:30 AM   End time: 1/6/2023 6:40 AM    Staffing  Authorizing Provider: Berhane Cervantes MD  Performing Provider: Nafisa Mccabe MD    Preanesthetic Checklist  Completed: patient identified, IV checked, site marked, risks and benefits discussed, surgical consent, monitors and equipment checked, pre-op evaluation and timeout performed  Peripheral Block  Patient position: supine  Prep: ChloraPrep  Patient monitoring: heart rate, cardiac monitor, continuous pulse ox, continuous capnometry and frequent blood pressure checks  Block type: popliteal  Laterality: left  Injection technique: single shot  Needle  Needle type: Stimuplex   Needle gauge: 21 G  Needle length: 4 in  Needle localization: anatomical landmarks and ultrasound guidance   -ultrasound image captured on disc.  Assessment  Injection assessment: negative aspiration, negative parasthesia and local visualized surrounding nerve  Paresthesia pain: none  Heart rate change: no  Slow fractionated injection: yes  Pain Tolerance: comfortable throughout block and no complaints  Medications:    Medications: bupivacaine (pf) (MARCAINE) injection 0.5% - Perineural   30 mL - 1/6/2023 6:45:00 AM    Additional Notes  VSS.  [facility] RN monitoring vitals throughout procedure.  Patient tolerated procedure well.

## 2023-01-06 NOTE — PLAN OF CARE
Patient states ready for discharge, VSS, no complaints of pain or nausea. Discharge instructions reviewed. Dressing CDI.

## 2023-01-06 NOTE — ANESTHESIA POSTPROCEDURE EVALUATION
Anesthesia Post Evaluation    Patient: Magda Lowery    Procedure(s) Performed: Procedure(s) (LRB):  ORIF, ANKLE, LEFT (Left)    Final Anesthesia Type: general      Patient location during evaluation: PACU  Patient participation: Yes- Able to Participate  Level of consciousness: awake and alert and oriented  Post-procedure vital signs: reviewed and stable  Pain management: adequate  Airway patency: patent    PONV status at discharge: No PONV  Anesthetic complications: no      Cardiovascular status: hemodynamically stable  Respiratory status: unassisted and spontaneous ventilation  Hydration status: euvolemic  Follow-up not needed.          Vitals Value Taken Time   /81 01/06/23 1032   Temp 36.5 °C (97.7 °F) 01/06/23 0855   Pulse 96 01/06/23 1037   Resp 13 01/06/23 1036   SpO2 100 % 01/06/23 1037   Vitals shown include unvalidated device data.      No case tracking events are documented in the log.      Pain/Amy Score: Amy Score: 9 (1/6/2023 10:15 AM)

## 2023-01-06 NOTE — INTERVAL H&P NOTE
No change to H&P.  Left bimalleolar ankle fracture, lateral and posterior with mild initial syndesmosis widening.  To OR for ORIF. Also had patellar dislocation reduced at the time of this injury. The risks, benefits and alternatives to surgery were discussed with the patient at great length.  These include bleeding, infection, vessel/nerve damage, pain, numbness, tingling, complex regional pain syndrome, hardware/surgical failure, need for further surgery, malunion, nonunion, DVT, PE, arthritis and death.  Patient states an understanding and wishes to proceed with surgery.   All questions were answered.  No guarantees were implied or stated.  Informed consent was obtained.          Active Hospital Problems    Diagnosis  POA    *Bimalleolar ankle fracture, left, closed, initial encounter [K62.112A]  Yes      Resolved Hospital Problems   No resolved problems to display.

## 2023-01-06 NOTE — OP NOTE
OP NOTE    DOS:  01/06/2023    Preop Dx: Left bimalleolar ankle fracture (lateral and posterior malleoli)    Status post left patellar dislocation    Postop Dx: Left bimalleolar ankle fracture (lateral and posterior malleoli)    Status post left patellar dislocation    Procedure: Open reduction internal fixation left bimalleolar ankle fracture - 28098    Examination under anesthesia left knee    Surgeon: Reid Polanco M.D.    Asst:  Ruperto Caballero M.D    Anesthesia: Mac plus regional    EBL:  10 cc    IVF:  1000 cc crystalloid    Implants: Synthes 6 hole 1/3 tubular plate and screws.  4 mm cannulated screw x1.    Specimens: None    Findings: Good fixation with stable syndesmosis.  Good knee range of motion with normal patellar tracking.    Dispo:  To PACU awake/stable       Indications for Procedure:      27-year-old female sustained a fall from ground level resulting in a left patellar dislocation and left bimalleolar ankle fracture with mild syndesmotic widening through the fracture site.  She was treated with splinting of the ankle and knee immobilizer after reduction of the patella.  She is now returning for ORIF of the ankle.  The risks, benefits and alternatives to surgery were discussed with the patient prior to going to the operating room.  Informed consent was obtained.    Procedure in Detail:    Patient was identified the preoperative holding area and the site was marked.  Regional analgesia was administered.  Patient was wheeled to the operating room and placed on the operating table in a supine position.  Monitored anesthesia care was induced and preoperative antibiotics were administered to include Ancef and clindamycin.  The left lower extremity was prepped and draped in sterile fashion.  A time-out was undertaken to confirm patient, side, site, surgery, surgeon and the administration of preoperative antibiotics.  All agreed and we proceeded.  The leg was exsanguinated and the tourniquet was raised.       I made a lateral incision dissected down to the level of the fibular fracture.  I clamped the fibula and placed a 2 mm K-wire from posterior to anterior.  I then made a small stab incision posteriorly and placed a guidewire for a 4 mm cannulated screw on the nondisplaced posterior malleolar fracture fragment.  I placed a screw gaining good compression.  I then stressed the syndesmosis and external rotation and with Cotton stress test with the fibula still clamped and pinned and the syndesmosis appeared to be stable.      I slid a 6 hole 1/3 tubular plate in the next last hole over the K-wire and clamped to the bone.  I placed an axillary screw proximally followed by another shaft screw.  I then removed the K-wire placed a lag screw this had been.  I then placed a posterior to anterior distal screw in good position.  I visualized the construct under fluoroscopy and confirmed anatomic reduction.  I also against stressed the syndesmosis which was stable.      The tourniquet was let down hemostasis obtained with electrocautery.  The wound was copiously irrigated normal saline solution.  The deep fascia was closed 0 Vicryl suture protecting the superficial peroneal nerve anteriorly.  The subcutaneous tissue was closed with 3-0 Vicryl suture and the skin with 3-0 nylon suture in running fashion.  The small posterior stab incision was closed with 3-0 nylon suture.  Sterile dressings were applied followed by short-leg posterior splint with stirrups.  Patient was placed back into a knee immobilizer.    All instrument and sponge counts were reported correct at the end of the case.  There no complications.  The patient was extubated, awakened and taken to the recovery room stable condition.    Plan the patient:     Multimodal pain management limiting narcotics.  81 mg aspirin b.i.d..  One more week in the knee immobilizer and then she should come out of this and begin range of motion of the knee.  Nonweightbearing ankle.   Sutures out in 2-3 weeks and begin range of motion.  Weight bear as tolerated at 4 weeks postoperative in a Cam boot.        Reid Polanco MD

## 2023-01-06 NOTE — ANESTHESIA PREPROCEDURE EVALUATION
01/06/2023  Magda Lowery is a 27 y.o., female.  Pre-operative evaluation for Procedure(s) (LRB):  ORIF, ANKLE, LEFT, [place], Diving board, C-arm clock side, Bone foam, Ancef, Possible syndesmosis fixation (Left)    Magda Lowery is a 27 y.o. female     Patient Active Problem List   Diagnosis    Thyroid nodule    Mild intermittent asthma without complication    Subclinical hypothyroidism    Multinodular thyroid    Cystic thyroid nodule    Bimalleolar ankle fracture, left, closed, initial encounter       Review of patient's allergies indicates:   Allergen Reactions    Cat dander Shortness Of Breath and Other (See Comments)     Asthma       No current facility-administered medications on file prior to encounter.     Current Outpatient Medications on File Prior to Encounter   Medication Sig Dispense Refill    acetaminophen (TYLENOL) 500 MG tablet Take 2 tablets (1,000 mg total) by mouth every 8 (eight) hours. 60 tablet 0    albuterol (PROVENTIL/VENTOLIN HFA) 90 mcg/actuation inhaler Inhale 2 puffs into the lungs every 6 (six) hours as needed for Wheezing or Shortness of Breath. Rescue 18 g 6    aspirin (ECOTRIN) 81 MG EC tablet Take 1 tablet (81 mg total) by mouth 2 (two) times a day. 60 tablet 0    ergocalciferol (ERGOCALCIFEROL) 50,000 unit Cap Take 1 capsule (50,000 Units total) by mouth every 7 days. 12 capsule 0    ibuprofen (ADVIL,MOTRIN) 800 MG tablet Take 1 tablet (800 mg total) by mouth every 8 (eight) hours. 30 tablet 0    methocarbamoL (ROBAXIN) 750 MG Tab Take 1 tablet (750 mg total) by mouth every 8 (eight) hours. for 10 days 30 tablet 0    oxyCODONE (ROXICODONE) 5 MG immediate release tablet Take 1 tablet (5 mg total) by mouth every 4 (four) hours as needed for Pain. 20 tablet 0    albuterol (PROVENTIL) 2.5 mg /3 mL (0.083 %) nebulizer solution Take 3 mLs (2.5 mg total) by  nebulization as needed for Wheezing. 3 mL 3       History reviewed. No pertinent surgical history.        Pre-op Assessment    I have reviewed the Patient Summary Reports.     I have reviewed the Nursing Notes. I have reviewed the NPO Status.      Review of Systems  Anesthesia Hx:  No problems with previous Anesthesia    Cardiovascular:  Cardiovascular Normal     Pulmonary:  Pulmonary Normal    Hepatic/GI:  Hepatic/GI Normal    Neurological:  Neurology Normal    Endocrine:   Hypothyroidism        Physical Exam  General: Cooperative    Airway:  Mouth Opening: Normal  TM Distance: Normal  Neck ROM: Normal ROM    Dental:  Intact        Anesthesia Plan  Type of Anesthesia, risks & benefits discussed:    Anesthesia Type: Gen Natural Airway  Intra-op Monitoring Plan: Standard ASA Monitors  Post Op Pain Control Plan: multimodal analgesia  Induction:  IV  Informed Consent: Informed consent signed with the Patient and all parties understand the risks and agree with anesthesia plan.  All questions answered.   ASA Score: 2  Day of Surgery Review of History & Physical: H&P Update referred to the surgeon/provider.    Ready For Surgery From Anesthesia Perspective.     .

## 2023-01-06 NOTE — ANESTHESIA PROCEDURE NOTES
Left Adductor Canal Single Injection    Patient location during procedure: pre-op   Block not for primary anesthetic.  Reason for block: at surgeon's request and post-op pain management   Post-op Pain Location: Left Ankle   Start time: 1/6/2023 6:35 AM  Timeout: 1/6/2023 6:30 AM   End time: 1/6/2023 6:40 AM    Staffing  Authorizing Provider: Berhane Cervantes MD  Performing Provider: Nafisa Mccabe MD    Preanesthetic Checklist  Completed: patient identified, IV checked, site marked, risks and benefits discussed, surgical consent, monitors and equipment checked, pre-op evaluation and timeout performed  Peripheral Block  Patient position: supine  Prep: ChloraPrep  Patient monitoring: heart rate, cardiac monitor, continuous pulse ox, continuous capnometry and frequent blood pressure checks  Block type: adductor canal  Laterality: left  Injection technique: single shot  Needle  Needle type: Stimuplex   Needle gauge: 20 G  Needle length: 4 in  Needle localization: ultrasound guidance and anatomical landmarks   -ultrasound image captured on disc.  Assessment  Injection assessment: negative aspiration, negative parasthesia and local visualized surrounding nerve  Paresthesia pain: none  Heart rate change: no  Slow fractionated injection: yes  Pain Tolerance: comfortable throughout block and no complaints  Medications:    Medications: bupivacaine (pf) (MARCAINE) injection 0.5% - Perineural   20 mL - 1/6/2023 6:45:00 AM    Additional Notes  VSS.  RN monitoring vitals throughout procedure.  Patient tolerated procedure well.  Block placed for surgical anesthesia   IV Sedation used and titrated for patient comfort-See MAR  Ultrasound guidance used to visualize the nerve bundle and sheath as well as confirm needle placement and deposition of the local anesthetic.  (1) Under ultrasound guidance, needle was inserted and placed in close proximity to the nerve bundle  (2) Ultrasound was also used to visualize the spread of  the anesthetic in close proximity to the brachial plexus  (3) The nerves appeared anatomically normal  (4) There were no apparent abnormal pathological findings    Ultrasound photos archived.  Pt tolerated procedure well. There were no immediate complications.

## 2023-01-06 NOTE — TRANSFER OF CARE
"Anesthesia Transfer of Care Note    Patient: Magda Lowery    Procedure(s) Performed: Procedure(s) (LRB):  ORIF, ANKLE, LEFT (Left)    Patient location: [facility]    Anesthesia Type: general    Transport from OR: Transported from OR on room air with adequate spontaneous ventilation    Post pain: adequate analgesia    Post assessment: no apparent anesthetic complications    Post vital signs: stable    Level of consciousness: awake    Nausea/Vomiting: no nausea/vomiting    Complications: none    Transfer of care protocol was followed      Last vitals:   Visit Vitals  /78 (BP Location: Left arm, Patient Position: Lying)   Pulse 99   Temp 36.5 °C (97.7 °F) (Temporal)   Resp 18   Ht 5' 8" (1.727 m)   Wt 103.9 kg (229 lb)   LMP 12/17/2022   SpO2 100%   Breastfeeding No   BMI 34.82 kg/m²     "

## 2023-01-06 NOTE — PATIENT INSTRUCTIONS
Postoperative Instructions  We are here for you before and after surgery.  Please review the below instructions.  If you have questions or concerns, contact our office.  ACTIVITY: Your weight-bearing status indicates how much weight you may (or may not) put on leg after surgery. These guidelines should be strictly followed in order to protect the surgical site.   Remember, no matter what option is selected below - you just had surgery and healing takes time and rest - PLEASE limit how much you are out and about for first 2 weeks to help decrease pain and swelling.  []  Weight Bearing as tolerated - Safe to put as much weight as you feel comfortable doing on the involved leg.  [x] Non-Weight Bearing - No weight or standing on the involved leg which means the leg should NOT touch ground when you walk.  [] Toe-Touch Weight Bearing - You may touch foot on floor for balance only, but should not walk using the involved leg.  [] Heel-Weight Bearing - Full weight bearing on heel only; no weight on toes.    ELEVATION: It is important that you elevate your foot during the first 48 hours and as much as possible for the first week after surgery! Most foot and ankle surgical procedures are associated with swelling around the surgical site. Elevating your foot is one of the most important ways to limit the swelling and ultimately shorten your recovery time. The easiest technique is to lie down and elevate your foot on pillows or blankets. Your foot should at least be above the level of your heart. It is okay to get up for brief periods of time, but the majority of time the foot should be at rest and elevated.  ICE: Ice, similar to elevation, helps control the swelling and also provides pain relief. Typically, alternating twenty minutes on, twenty minutes off a couple times per day for first 2-3 days. Use a barrier between the ice and your skin/dressing to make sure dressing does not get wet and skin does not get too cold.  In  rare cases, I will tell you not to use ice to prevent making blood vessels constrict.  [x] ICE OK WITH BARRIER LAYER  [] POLARCARE (START POD#1, MAX 20MIN/HOUR)  DRESSING: Most dressings are not changed until your first postoperative visit. If we want you to change the dressing on your own, specific instructions and the appropriate supplies will be given to you in recovery before discharge. All dressings should be kept clean and dry. If you have problems with your dressing, please contact our office and you can be seen sooner for a new dressing.    [x] DO NOT CHANGE DRESSING           [] CHANGE DRESSING IN ___ DAYS  OTHER POSTOPERATIVE EQUIPMENT/INSTRUCTIONS:  []  POSTOPERATIVE SHOE: A postoperative hard-soled shoe has been provided to you to help protect your foot. This is particularly important for you to wear when you are up and walking, you should follow the weight bearing instructions you were given. The postoperative shoe does not need to be worn when you are in bed or resting.  [] POSTOPERATIVE BOOT: A boot has been placed to protect your ankle.  This is particularly important for you to wear, and you should follow the weight bearing instructions you were given.   []  May remove boot to sleep    [] Must keep boot on at all times  [x] POSTOPERATIVE SPLINT: A well-padded dressing with plaster (hard cast material) has been applied to your lower leg. This protects the surgery and helps control swelling. Do not remove it.  Do not bear weight it. It needs to remain dry. Do not attempt to scratch underneath it. If it feels too tight, elevation will usually decrease the swelling and improve your comfort. If the splint becomes soiled, wet or feels excessively tight even after elevation, contact our office and it can be changed if necessary.  []  PREVENA Plus Incisional Management system: An incisional wound vacuum was placed underneath your splint. There is a tube that is connected to a cannister battery pack, please  do not kink or disconnect this tube. When at all possible, please keep this plugged into an outlet so that the battery does not die. The prevena wound vac will be taken off at your postop visit. If you notice any alerts on the prevena battery please give our office a call.    DISCOLORATION: It is common for the toes to swell after surgery and turn a bluish color, especially if the foot is in a position below the heart. If the toes turn dark blue, dark black or completely white, please call our office immediately ([phone]) or come to the emergency room. This is an emergency!  INFECTION: Infection is uncommon, especially the first week after surgery. A low grade fever (less than 101°F) is very common after surgery and is not a sign of infection. Call our office if you experience streaking redness up the leg, foul smell, excessive drainage and fever greater than 101°       MEDICATIONS:   BLOOD CLOTS: It is rare, but blood clots (also known as DVTs) can happen after foot and ankle surgery.  Please make sure to discuss family or personal history of this with me.  For certain surgeries and higher risk patients, I will add a medication to prevent blood clots.   [x] Aspirin 81mg twice daily or 325 mg daily   []  Other medication - a separate prescription will be provided if needed   [] No medication needed - you should make sure to move around and move your ankle a couple times per day to keep blood circulating  PAIN MEDICATIONS  Narcotics: You will be given a prescription for a narcotic pain medication.  Narcotic prescriptions are limited depending on the type of surgical procedure.  Our goal is for narcotics to be your second line medication, meaning you should only use it if the other pain management methods are not working for you.  I hope we can treat your pain through alternate strategies that you no longer need them (or at least are needing much less) 2 weeks after surgery.  Prior to surgery or the day of  surgery, you will be given these prescriptions.  Refills for pain pills are best called into the clinic during office hours 1-2 days BEFORE you anticipate running out. As a rule, pain medications are not refilled after hours or on weekends.    Anti-inflammatories: These medications, also known as NSAIDs (i.e. Ibuprofen, Motrin, Naproxen, Aleve, Advil, Celebrex, etc) may be used in combination with pain medications (narcotics) and should be 1st line.  For certain surgeries, you may be given a prescription-strength NSAID, which should be taken as prescribed, but cannot be combined with other NSAID medications.  NSAIDs should be taken with food, and if you have concerns about these medications, consult your doctor prior to surgery.  For certain surgeries, I will tell you not to take NSAIDs as they can interfere with healing of fractures or fusions.   [] NSAIDS (except Aspirin and Celebrex) should not be taken until 4-6 weeks after surgery    [x] NSAIDs encouraged; take according to bottle instructions  Tylenol (aka Acetaminophen): This is a powerful pain reliever and may be taken as alternative to narcotics and NSAIDs (does not affect bone healing).  You should take according to bottle instructions and not take more than a total of 3 grams (3,000 milligrams) per day. Keep in mind that the pain medication that was prescribed to you may have acetaminophen as part of it.  Consult your doctor or the pharmacy if you have any questions or concerns.  Methocarbomol (aka Robaxin):  This is a muscle relaxer, which can help with muscle spasm and cramping pain after surgery.  You may be given a prescription for this as it can help treat muscle pain.

## 2023-01-07 RX ORDER — OXYCODONE HYDROCHLORIDE 5 MG/1
5 TABLET ORAL EVERY 4 HOURS PRN
Qty: 25 TABLET | Refills: 0 | Status: SHIPPED | OUTPATIENT
Start: 2023-01-07 | End: 2023-05-15

## 2023-01-07 RX ORDER — DEXTROMETHORPHAN HYDROBROMIDE, GUAIFENESIN 5; 100 MG/5ML; MG/5ML
650 LIQUID ORAL EVERY 8 HOURS
Qty: 42 TABLET | Refills: 0 | Status: SHIPPED | OUTPATIENT
Start: 2023-01-07 | End: 2023-01-21

## 2023-01-07 RX ORDER — IBUPROFEN 600 MG/1
600 TABLET ORAL 4 TIMES DAILY
Qty: 56 TABLET | Refills: 0 | Status: SHIPPED | OUTPATIENT
Start: 2023-01-07 | End: 2023-01-21

## 2023-01-07 RX ORDER — METHOCARBAMOL 500 MG/1
500 TABLET, FILM COATED ORAL 3 TIMES DAILY
Qty: 42 TABLET | Refills: 0 | Status: SHIPPED | OUTPATIENT
Start: 2023-01-07 | End: 2023-01-21

## 2023-01-08 ENCOUNTER — PATIENT MESSAGE (OUTPATIENT)
Dept: ADMINISTRATIVE | Facility: OTHER | Age: 28
End: 2023-01-08
Payer: COMMERCIAL

## 2023-01-09 ENCOUNTER — PATIENT MESSAGE (OUTPATIENT)
Dept: ADMINISTRATIVE | Facility: OTHER | Age: 28
End: 2023-01-09
Payer: COMMERCIAL

## 2023-01-09 ENCOUNTER — TELEPHONE (OUTPATIENT)
Dept: ORTHOPEDICS | Facility: CLINIC | Age: 28
End: 2023-01-09
Payer: COMMERCIAL

## 2023-01-09 NOTE — TELEPHONE ENCOUNTER
Called and confirm with pt her [employer] forms was received and fax over to disability. Pt verbally understood and was satisfied.

## 2023-01-12 ENCOUNTER — PATIENT MESSAGE (OUTPATIENT)
Dept: ORTHOPEDICS | Facility: CLINIC | Age: 28
End: 2023-01-12
Payer: COMMERCIAL

## 2023-01-13 ENCOUNTER — PATIENT MESSAGE (OUTPATIENT)
Dept: ADMINISTRATIVE | Facility: OTHER | Age: 28
End: 2023-01-13
Payer: COMMERCIAL

## 2023-01-23 ENCOUNTER — OFFICE VISIT (OUTPATIENT)
Dept: ORTHOPEDICS | Facility: CLINIC | Age: 28
End: 2023-01-23
Payer: COMMERCIAL

## 2023-01-23 VITALS — RESPIRATION RATE: 18 BRPM | SYSTOLIC BLOOD PRESSURE: 122 MMHG | HEART RATE: 106 BPM | DIASTOLIC BLOOD PRESSURE: 77 MMHG

## 2023-01-23 DIAGNOSIS — S82.842A BIMALLEOLAR ANKLE FRACTURE, LEFT, CLOSED, INITIAL ENCOUNTER: Primary | ICD-10-CM

## 2023-01-23 PROCEDURE — 99024 PR POST-OP FOLLOW-UP VISIT: ICD-10-PCS | Mod: S$GLB,,, | Performed by: PHYSICIAN ASSISTANT

## 2023-01-23 PROCEDURE — 99999 PR PBB SHADOW E&M-EST. PATIENT-LVL III: ICD-10-PCS | Mod: PBBFAC,,, | Performed by: PHYSICIAN ASSISTANT

## 2023-01-23 PROCEDURE — 3078F DIAST BP <80 MM HG: CPT | Mod: CPTII,S$GLB,, | Performed by: PHYSICIAN ASSISTANT

## 2023-01-23 PROCEDURE — 1159F MED LIST DOCD IN RCRD: CPT | Mod: CPTII,S$GLB,, | Performed by: PHYSICIAN ASSISTANT

## 2023-01-23 PROCEDURE — 99999 PR PBB SHADOW E&M-EST. PATIENT-LVL III: CPT | Mod: PBBFAC,,, | Performed by: PHYSICIAN ASSISTANT

## 2023-01-23 PROCEDURE — 3078F PR MOST RECENT DIASTOLIC BLOOD PRESSURE < 80 MM HG: ICD-10-PCS | Mod: CPTII,S$GLB,, | Performed by: PHYSICIAN ASSISTANT

## 2023-01-23 PROCEDURE — 3074F SYST BP LT 130 MM HG: CPT | Mod: CPTII,S$GLB,, | Performed by: PHYSICIAN ASSISTANT

## 2023-01-23 PROCEDURE — 1159F PR MEDICATION LIST DOCUMENTED IN MEDICAL RECORD: ICD-10-PCS | Mod: CPTII,S$GLB,, | Performed by: PHYSICIAN ASSISTANT

## 2023-01-23 PROCEDURE — 3074F PR MOST RECENT SYSTOLIC BLOOD PRESSURE < 130 MM HG: ICD-10-PCS | Mod: CPTII,S$GLB,, | Performed by: PHYSICIAN ASSISTANT

## 2023-01-23 PROCEDURE — 99024 POSTOP FOLLOW-UP VISIT: CPT | Mod: S$GLB,,, | Performed by: PHYSICIAN ASSISTANT

## 2023-01-23 NOTE — PROGRESS NOTES
Principal Orthopedic Problem:  Left bimalleolar ankle fracture (lateral and posterior malleoli)                          Status post left patellar dislocation     Relevant Medical History: according to chart    PMHX:  healthy     HPI: Ms. Lowery is  a 27 year old female who presented to the Ed on 12/30/22 with left knee and ankle pain after falling.   RADS:   KNEE: dislocated left patella    Reduced at bedside, knee brace    ANKLE:  minimally displaced lateral malleolar fracture, minimally displaced posterior malleolar fracture, and subtle medial clear space widening.  Normal alignment of the tibiotalar joint.  Stress views of the ankle obtained with bedside fluoroscopy showed widening of the medial gutter with external rotation stress.    01/06/23: :   Open reduction internal fixation left bimalleolar ankle fracture - 56527    Examination under anesthesia left knee    Ms. Lowery is here today for a post-operative visit    Interval History:  she reports that she is doing ok.   she is at home . she is not participating in PT/OT. She is removing her knee brace for range of motion.   Pain is controled.  she is  taking pain medication.    she denies fever, chills, and sweats .     Complications since time of surgery: none     Physical exam:    Patient arrives to exam room: wheelchair splitn clean and knee brace in place .  Patient is  accompanied family member    Dressing taken down.  Incision is clean, dry and intact.  Sutures removed without difficulty.   Healing well no signs of breakdown or infection.    RADS: All pertinent images were reviewed by myself:   none done today    Assessment:  Post-op visit ( 2 weeks)    Plan:  Current care, treatment plan, precautions, activity level/ modifications, limitations, rehabilitation exercises and proposed future treatment were discussed with the patient. We discussed the need to monitor for changes in symptoms and condition and report them to the physician.   Discussed importance of compliance with all appointments and follow up examinations.     WOUND CARE ORDERS  - The patient was advised to keep the incision clean and dry for the next 24 hours after which she may wash the area with antibacterial soap in the shower. Will not submerge until the incision is completely healed  -Patient was advised to monitor wound closely and multiple times daily for any problems. Call clinic immediately or report to ED for immediate medical attention for any complications including reopening of wound, drainage, purulence, redness, streaking, odor, pain out of proportion, fever, chills, etc.       ACTIVITY:   - light  KNEE  -range of motion as tolerated   ANKLE:    - ROMAT   - NWB 4 weeks pending healing     -PT/OT, hold working on own , Patient is responsible to establish and continue care      PAIN MEDICATION:   - Multimodal pain control  - Pain medication: refill was not needed  - Pain medication refill policy provided to patient for review, yes.    - Patient was informed a multi-modal approach is used to treat their pain. With the goal to get off of narcotic pain medication and discontinue as soon as possible.   - ice and elevation to reduce pain and swelling     DVT PROPHYLAXIS:   - ASA 81 mg bid      FOLLOW UP:   - Patient will follow up in the clinic in 2 weeks.  - X-ray of her knee 2 view AP lat and ankle NWB OOB is needed.    - Pending healing at time consider advance weight bear and order PT/       If there are any questions prior to scheduled follow up, the patient was instructed to contact the office

## 2023-02-06 ENCOUNTER — HOSPITAL ENCOUNTER (OUTPATIENT)
Dept: RADIOLOGY | Facility: HOSPITAL | Age: 28
Discharge: HOME | End: 2023-02-06
Attending: PHYSICIAN ASSISTANT
Payer: COMMERCIAL

## 2023-02-06 ENCOUNTER — OFFICE VISIT (OUTPATIENT)
Dept: ORTHOPEDICS | Facility: CLINIC | Age: 28
End: 2023-02-06
Payer: COMMERCIAL

## 2023-02-06 DIAGNOSIS — S82.842A BIMALLEOLAR ANKLE FRACTURE, LEFT, CLOSED, INITIAL ENCOUNTER: Primary | ICD-10-CM

## 2023-02-06 DIAGNOSIS — S82.842A BIMALLEOLAR ANKLE FRACTURE, LEFT, CLOSED, INITIAL ENCOUNTER: ICD-10-CM

## 2023-02-06 DIAGNOSIS — S83.005D CLOSED DISLOCATION OF LEFT PATELLA, SUBSEQUENT ENCOUNTER: ICD-10-CM

## 2023-02-06 PROCEDURE — 1159F MED LIST DOCD IN RCRD: CPT | Mod: CPTII,S$GLB,, | Performed by: PHYSICIAN ASSISTANT

## 2023-02-06 PROCEDURE — 99999 PR PBB SHADOW E&M-EST. PATIENT-LVL III: ICD-10-PCS | Mod: PBBFAC,,, | Performed by: PHYSICIAN ASSISTANT

## 2023-02-06 PROCEDURE — 73610 XR ANKLE COMPLETE 3 VIEW LEFT: ICD-10-PCS | Mod: 26,LT,, | Performed by: RADIOLOGY

## 2023-02-06 PROCEDURE — 99024 PR POST-OP FOLLOW-UP VISIT: ICD-10-PCS | Mod: S$GLB,,, | Performed by: PHYSICIAN ASSISTANT

## 2023-02-06 PROCEDURE — 73610 X-RAY EXAM OF ANKLE: CPT | Mod: TC,LT

## 2023-02-06 PROCEDURE — 99999 PR PBB SHADOW E&M-EST. PATIENT-LVL III: CPT | Mod: PBBFAC,,, | Performed by: PHYSICIAN ASSISTANT

## 2023-02-06 PROCEDURE — 73610 X-RAY EXAM OF ANKLE: CPT | Mod: 26,LT,, | Performed by: RADIOLOGY

## 2023-02-06 PROCEDURE — 99024 POSTOP FOLLOW-UP VISIT: CPT | Mod: S$GLB,,, | Performed by: PHYSICIAN ASSISTANT

## 2023-02-06 PROCEDURE — 1159F PR MEDICATION LIST DOCUMENTED IN MEDICAL RECORD: ICD-10-PCS | Mod: CPTII,S$GLB,, | Performed by: PHYSICIAN ASSISTANT

## 2023-02-06 NOTE — PROGRESS NOTES
Principal Orthopedic Problem:  Left bimalleolar ankle fracture (lateral and posterior malleoli)                          Status post left patellar dislocation     Relevant Medical History: according to chart    PMHX:  healthy     HPI: Ms. Lowery is  a 27 year old female who presented to the Ed on 12/30/22 with left knee and ankle pain after falling.   RADS:   KNEE: dislocated left patella    Reduced at bedside, knee brace    ANKLE:  minimally displaced lateral malleolar fracture, minimally displaced posterior malleolar fracture, and subtle medial clear space widening.  Normal alignment of the tibiotalar joint.  Stress views of the ankle obtained with bedside fluoroscopy showed widening of the medial gutter with external rotation stress.    01/06/23: :   Open reduction internal fixation left bimalleolar ankle fracture - [id number]    Examination under anesthesia left knee    Ms. Lowery is here today for a post-operative visit    Interval History:  she reports that she is doing ok.   she is at home . she is not participating in PT/OT. She is removing her knee brace for range of motion. Able to get to 90 but with some pain and stiffness, reports no subluxation of the knee cap   Pain is controled.  she is  taking pain medication.    she denies fever, chills, and sweats .     Complications since time of surgery: none     Physical exam:    Patient arrives to exam room: wheelchair boot clean and knee brace in place .  Patient is  accompanied family member    Dressing taken down.  Incision is clean, dry and intact.  Sutures removed without difficulty.   Healing well no signs of breakdown or infection.    RADS: All pertinent images were reviewed by myself:   ANKLE: Postoperative changes of internal fixation of the distal tibia and fibula identified.  The position and alignment is satisfactory    Assessment:  Post-op visit ( 4 weeks)    Plan:  Current care, treatment plan, precautions, activity level/ modifications,  limitations, rehabilitation exercises and proposed future treatment were discussed with the patient. We discussed the need to monitor for changes in symptoms and condition and report them to the physician.  Discussed importance of compliance with all appointments and follow up examinations.     WOUND CARE ORDERS  - monitor wound closely and multiple times daily for any problems. Call clinic immediately or report to ED for immediate medical attention for any complications including reopening of wound, drainage, purulence, redness, streaking, odor, pain out of proportion, fever, chills, etc.       ACTIVITY:   - light  KNEE  -range of motion as tolerated   ANKLE:    - ROMAT   - weight bearing as tolerated      -PT/OT, [facility]  , Patient is responsible to establish and continue care      PAIN MEDICATION:   - Multimodal pain control  - Pain medication: refill was not needed, will call if needed   - Pain medication refill policy provided to patient for review, yes.    - Patient was informed a multi-modal approach is used to treat their pain. With the goal to get off of narcotic pain medication and discontinue as soon as possible.   - ice and elevation to reduce pain and swelling     DVT PROPHYLAXIS:   - ASA 81 mg bid    FOLLOW UP:   - Patient will follow up in the clinic in 6 weeks.  - X-ray of her knee 2 view AP lat and ankle NWB OOB is needed.    - Pending healing at time consider advance weight bear and order PT/       If there are any questions prior to scheduled follow up, the patient was instructed to contact the office

## 2023-02-13 ENCOUNTER — TELEPHONE (OUTPATIENT)
Dept: ORTHOPEDICS | Facility: CLINIC | Age: 28
End: 2023-02-13
Payer: COMMERCIAL

## 2023-02-13 NOTE — TELEPHONE ENCOUNTER
Spoke with pt. That she need to e-mail her FMLA forms to [email or web address].  I also e-mail pt a copy of the Disability form with all information.   Pt e-mail [email or web address]. Patient states verbal understanding and has no further questions.

## 2023-02-13 NOTE — TELEPHONE ENCOUNTER
----- Message from Savannah Harris CMA sent at 2/13/2023  1:12 PM CST -----  Regarding: [employer] paperwork  Contact: [phone]  Pt calling to check the status of paperwork faxed last week. Please call the pt to discuss further.    Thank you     No

## 2023-02-16 ENCOUNTER — PATIENT MESSAGE (OUTPATIENT)
Dept: ADMINISTRATIVE | Facility: OTHER | Age: 28
End: 2023-02-16
Payer: COMMERCIAL

## 2023-02-17 ENCOUNTER — CLINICAL SUPPORT (OUTPATIENT)
Dept: REHABILITATION | Facility: HOSPITAL | Age: 28
End: 2023-02-17
Payer: COMMERCIAL

## 2023-02-17 DIAGNOSIS — M25.672 DECREASED RANGE OF MOTION OF LEFT ANKLE: ICD-10-CM

## 2023-02-17 DIAGNOSIS — R53.1 DECREASED STRENGTH: ICD-10-CM

## 2023-02-17 DIAGNOSIS — S83.005D CLOSED DISLOCATION OF LEFT PATELLA, SUBSEQUENT ENCOUNTER: ICD-10-CM

## 2023-02-17 DIAGNOSIS — S82.842A BIMALLEOLAR ANKLE FRACTURE, LEFT, CLOSED, INITIAL ENCOUNTER: ICD-10-CM

## 2023-02-17 PROBLEM — S83.005A CLOSED DISLOCATION OF LEFT PATELLA: Status: ACTIVE | Noted: 2023-02-17

## 2023-02-17 PROCEDURE — 97161 PT EVAL LOW COMPLEX 20 MIN: CPT | Mod: PN

## 2023-02-17 PROCEDURE — 97110 THERAPEUTIC EXERCISES: CPT | Mod: PN

## 2023-02-17 PROCEDURE — 97140 MANUAL THERAPY 1/> REGIONS: CPT | Mod: PN

## 2023-02-17 NOTE — PLAN OF CARE
[facility]  Physical Therapy Initial Evaluation    Date: 2/17/2023   Name: Magda Lowery  Clinic Number: [id number]    Therapy Diagnosis:   Encounter Diagnoses   Name Primary?    Bimalleolar ankle fracture, left, closed, initial encounter     Closed dislocation of left patella, subsequent encounter     Decreased range of motion of left ankle     Decreased strength      Physician: Liudmila Monge, KENDRA    Physician Orders: PT Eval and Treat  Medical Diagnosis from Referral:   S82.842A (ICD-10-CM) - Bimalleolar ankle fracture, left, closed, initial encounter   S83.005D (ICD-10-CM) - Closed dislocation of left patella, subsequent encounter     Evaluation Date: 2/17/2023  Authorization Period Expiration: 02/06/2024  Plan of Care Expiration: 4/14/23  Progress Note Due: 3/14/23  Visit # / Visits authorized: 1/1   FOTO: 1/5    Precautions: Standard, asthma, hypothyroidism     Time In: 11:10 AM  Time Out: 12:00 PM  Total Appointment Time (timed & untimed codes): 50 minutes (1 TE, 1 MT, 1 LCE)    SUBJECTIVE   Date of onset: 12/29/22    History of current condition - Magda reports: on 12/29/22 she was walking on the grass and slipped (she thinks on the mud) injuring her left knee and ankle. She denies any audible pops and felt knee pain primarily; her left knee cap was subluxed laterally and found out later that her left ankle was fractured in 2 places. No surgery for left knee and received a left ankle ORIF on 1/6/23. At home she has been working on ankle range of motion and bending her left knee as well. She has been granted WBAT on LLE with boot donned and left knee range of motion per tolerance. She arrived today in a wheelchair, left knee immobilizer donned, and left boot donned (brought crutches). Reports mild pain in left pain when she is bending it, denies any feeling of patellar instability, and has been conservative with her left ankle motion to keep it safe. Denies any numbness or  tingling.     KNEE: dislocated left patella               Reduced at bedside, knee brace  ANKLE:  minimally displaced lateral malleolar fracture, minimally displaced posterior malleolar fracture, and subtle medial clear space widening.  Normal alignment of the tibiotalar joint.  Stress views of the ankle obtained with bedside fluoroscopy showed widening of the medial gutter with external rotation stress  01/06/23: : Open reduction internal fixation left bimalleolar ankle fracture - 36001. Examination under anesthesia left knee  ANKLE: ROMAT, weight bearing as tolerated  KNEE: progressive range of motion.    Falls: None since original injury    Imaging, see EMR  2/6/23 left ankle x-ray  Postoperative changes of internal fixation of the distal tibia and fibula identified.  The position and alignment is satisfactory.    12/29/23 left knee CT scan  No acute fractures identified. Interval partial reduction of laterally dislocated patella which remains laterally subluxed and laterally tilted but intervally overall improved in position and alignment following partial reduction. Suprapatellar effusion noted.  Impression: No acute fractures identified. Interval partial reduction of laterally dislocated patella which remains laterally subluxed and laterally tilted but intervally overall improved in position and alignment following partial reduction. Suprapatellar effusion noted.    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  Prior Level of Function: no issues or pain  Current Level of Function: limited left knee and ankle range of motion    Pain:  Current 0/10, worst 10/10 (day of and after surgery), best 0/10   Location: left ankle and left knee  Description: Aching, Dull, and Tight  Aggravating Factors: Bending  Easing Factors: ibuprofen    Patients goals: to get back to walking normally      Medical History:   Past Medical History:   Diagnosis Date    Asthma     Bimalleolar ankle fracture, left, closed, initial encounter 12/29/2022    Hypothyroidism, unspecified        Surgical History:   Magda Lowery  has a past surgical history that includes Open reduction and internal fixation (ORIF) of injury of ankle (Left, 1/6/2023).    Medications:   Magda has a current medication list which includes the following prescription(s): albuterol, albuterol, aspirin, ergocalciferol, and oxycodone.    Allergies:   Review of patient's allergies indicates:   Allergen Reactions    Cat dander Shortness Of Breath and Other (See Comments)     Asthma        OBJECTIVE     Gait: wheelchair Mod I and carried in crutches  Posture: mild WB on LLE with Bilateral crutches  Sensation: WNL  Palpation: no tenderness to palpation    A/PROM and MMT  * = left knee and ankle pain with testing  NT = Not tested  Hip  Right   Left  Pain/Dysfunction with Movement    AROM PROM MMT AROM PROM MMT    Flexion WFL WFL 5/5 WFL WFL 4/5    Extension WFL WFL 5/5 WFL WFL NT    Abduction WFL WFL 5/5 WFL WFL 4/5    Adduction WFL WFL 5/5 WFL WFL NT    Internal rotation WFL WFL 5/5 WFL WFL NT    External rotation WFL WFL 5/5 WFL WFL NT       Knee  Right   Left  Pain/Dysfunction with Movement    AROM PROM MMT AROM PROM MMT    Flexion (140 deg) 132 133 5/5 36 55 4-/5    Extension (0-5 deg) 0 0 5/5 0 0 3+/5 apprehension     Ankle  Right   Left  Pain/Dysfunction with Movement    AROM PROM MMT AROM PROM MMT    Great toe (45/70 deg) -- -- 5/5 -- -- 4/5    Plantarflexion (50 deg WNL WNL 5/5 53 54 3+/5    Dorsiflexion (20 deg) WNL WNL 5/5 -4 -2 3+/5    Inversion WNL WNL 5/5 16 20 3+/5    Eversion WNL WNL 5/5 14 16 3+/5      DL heel raise assessment = NT  SL heel raise assessment = NT    Special tests:  Patellar glide: slight laxity with lateral patellar glide with mild apprehension  Anterior Drawer Test = NT  Calcaneofibular ligament stress test = negative  left  Interdigital Neuroma Test = negative B  Talar Tilit Test = not tested  Navicular drop test = not tested   (Difference of >10 mm is considered significant excessive foot pronation.)  Tinel's Sign Test (tarsal tunnel syndrome) = not tested  Mccabe test = no tested     Limitation/Restriction for FOTO Ankle Survey    Therapist reviewed FOTO scores for Magda Lowery on 2/17/2023.   FOTO documents entered into [employer] - see Media section.    Limitation Score: 68%  Predicted: 35%       TREATMENT   Total Treatment time (time-based codes) separate from Evaluation: 18 minutes      Magda received the treatments listed below:      manual therapy techniques: Joint mobilizations, Manual traction, Myofacial release, Soft tissue Mobilization, and Friction Massage were applied to the: left knee and ankle for 8 minutes, including:  Medial and superior patellar glides, grade I-II, left  Knee PROM  Ankle PROM    therapeutic exercises to develop strength, endurance, ROM, flexibility, posture, and core stabilization for 10 minutes including:  Ankle circles: 5x Bilateral   Calf stretch: 1x30'' left with towel  Quad sets: 5x5'' holds, towel under left ankle  Sidelying hip abduction: 10x left  Supine heel slide: 5x left (use strap next visit)  Seated heel slides: 5x left    PATIENT EDUCATION AND HOME EXERCISES     Home Exercises and Patient Education Provided:  Education provided:   - proper foot wear  - course of therapy, prognosis  - importance of HEP    Written Home Exercises Provided: yes.  Exercises were reviewed and Magda was able to demonstrate them prior to the end of the session.  Magda demonstrated good  understanding of the education provided.     See EMR under Patient Instructions for exercises provided 2/17/2023.    ASSESSMENT   Magda is a 27 y.o. female referred to outpatient Physical Therapy with a medical diagnosis of Left Bimalleolar ankle fracture and Closed dislocation of left patella presenting to PT at [facility]. She received a left ankle ORIF on 1/6/23 and was non-operative knee immobilizer for left lateral patellar dislocation (reduced at external rotation visit on say of injury 12/29/22. Pt currently presents with left knee and ankle pain, decreased left ankle and knee A/PROM, decreased LLE strength, impaired posture, impaired balance and gait, and functional deficits with standing/walking, transfers, and all weight bearing activities.     Patient prognosis is Excellent.   Patient will benefit from skilled outpatient Physical Therapy to address the deficits stated above and in the chart below, provide patient /family education, and to maximize patientt's level of independence.     Plan of care discussed with patient: Yes  Pt's spiritual, cultural and educational needs considered and patient is agreeable to the plan of care and goals as stated below:     Anticipated Barriers for therapy: comorbidities, fear and avoidance of left ankle/knee ROM    Medical Necessity is demonstrated by the following  History  Co-morbidities and personal factors that may impact the plan of care Co-morbidities:   asthma, hypothyroidism     Personal Factors:   no deficits     low   Examination  Body Structures and Functions, activity limitations and participation restrictions that may impact the plan of care Body Regions:   back  lower extremities  trunk    Body Systems:    gross symmetry  ROM  strength  gross coordinated movement  balance  gait  transfers  transitions  motor control  edema    Participation Restrictions:   Recreational walking    Activity limitations:   Learning and applying knowledge  no deficits    General Tasks and Commands  no deficits    Communication  no deficits    Mobility  lifting and carrying objects  walking  driving (bike, car, motorcycle)    Self care  no deficits    Domestic Life  shopping  cooking  doing house work (cleaning house, washing dishes,  laundry)    Interactions/Relationships  no deficits    Life Areas  no deficits    Community and Social Life  no deficits         high   Clinical Presentation stable and uncomplicated low   Decision Making/ Complexity Score: low     GOALS: Short Term Goals:  4 weeks  1. Report decreased left ankle and knee pain </= 3/10 with standing and walking with AD to increase tolerance for ADLs.  2. Increase left ankle AROM by 5 degrees in order to walk with min to no compensation.  3. Pt will demo good sitting and standing posture for improved spine and joint alignment for improved biomechanics.  4. Pt to tolerate HEP to improve ROM and independence with ADL's.  5. Pt will perform standing squat without AD with equal weight bearing on each lower extremity for standing ADLs.     Long Term Goals: 8 weeks  1. Report decreased left ankle and knee pain </= 1/10 with walking and stair negotiation without AD to increase tolerance for increased QoL and improved ADLs.  2. Patient goal: to get back to walking normally.  3. Increase strength to >/= 4+/5 for BLE to increase tolerance for ADL and work activities.  4. Pt will report at </= 35% impaired on ANKLE FOTO score to demo increased functional mobility.  5. Pt will be able to ambulate community distances and negotiate stairs with minimal ankle pain for increased functional mobility and QoL.    PLAN   Plan of care Certification: 2/17/2023 to 4/14/23.    Outpatient Physical Therapy 2 times weekly for 8 weeks to include the following interventions: Cervical/Lumbar Traction, Electrical Stimulation, Gait Training, Manual Therapy, Moist Heat/ Ice, Neuromuscular Re-ed, Orthotic Management and Training, Patient Education, Self Care, Therapeutic Activities, and Therapeutic Exercise.     Yimi Medina, PT      I CERTIFY THE NEED FOR THESE SERVICES FURNISHED UNDER THIS PLAN OF TREATMENT AND WHILE UNDER MY CARE   Physician's comments:     Physician's Signature:  ___________________________________________________

## 2023-02-20 PROBLEM — R53.1 DECREASED STRENGTH: Status: ACTIVE | Noted: 2023-02-20

## 2023-02-20 PROBLEM — M25.672 DECREASED RANGE OF MOTION OF LEFT ANKLE: Status: ACTIVE | Noted: 2023-02-20

## 2023-02-22 ENCOUNTER — CLINICAL SUPPORT (OUTPATIENT)
Dept: REHABILITATION | Facility: HOSPITAL | Age: 28
End: 2023-02-22
Payer: COMMERCIAL

## 2023-02-22 DIAGNOSIS — R53.1 DECREASED STRENGTH: ICD-10-CM

## 2023-02-22 DIAGNOSIS — M25.672 DECREASED RANGE OF MOTION OF LEFT ANKLE: Primary | ICD-10-CM

## 2023-02-22 PROCEDURE — 97140 MANUAL THERAPY 1/> REGIONS: CPT | Mod: PN

## 2023-02-22 PROCEDURE — 97110 THERAPEUTIC EXERCISES: CPT | Mod: PN

## 2023-02-22 PROCEDURE — 97530 THERAPEUTIC ACTIVITIES: CPT | Mod: PN

## 2023-02-22 NOTE — PROGRESS NOTES
[facility]   Physical Therapy Treatment Note     Name: Magda Lowery  [facility] Number: [id number]    Therapy Diagnosis:   Encounter Diagnoses   Name Primary?    Decreased range of motion of left ankle Yes    Decreased strength      Physician: Liudmila Monge PA-C    Visit Date: 2/22/2023    Physician Orders: PT Eval and Treat  Medical Diagnosis from Referral:   S82.842A (ICD-10-CM) - Bimalleolar ankle fracture, left, closed, initial encounter   S83.005D (ICD-10-CM) - Closed dislocation of left patella, subsequent encounter      Evaluation Date: 2/17/2023  Authorization Period Expiration: 02/06/2024  Plan of Care Expiration: 4/14/23  Progress Note Due: 3/14/23  Visit # / Visits authorized: 1/1, 1/20  FOTO: 2/5     Precautions: Standard, asthma, hypothyroidism     Time In: 9:05 AM  Time Out: 10:00 AM  Total Billable Time: 55 minutes (2 TE, 1 TA, 1 MT)    SUBJECTIVE     Pt reports: some soreness in left ankle at surgery site only when she puts weight on it, sometimes will get numbness if she is on it too long, and has tried standing without brace or boot donned.  She was compliant with home exercise program.  Response to previous treatment: 1st after  Functional change: 1st after    Pain: 2/10  Location: left ankle  Pain: 0/10  Location: left knee      OBJECTIVE     Objective Measures updated at progress report unless specified.     Treatment     Magda received the treatments listed below:      manual therapy techniques: Joint mobilizations, Manual traction, Myofacial release, Soft tissue Mobilization, and Friction Massage were applied to the: left knee and ankle for 15 minutes, including:  Gentle left TCJ distraction, grade I-II  Medial/lateral calcaneal inversion/eversion mobs, grade II-III  Left calf stretch  Medial and superior patellar glides, grade I-II, left  Ankle PROM     therapeutic exercises to develop strength, endurance, ROM, flexibility, posture, and core stabilization for 30  minutes including:  Ankle circles: 30x Bilateral   Calf stretch: 1x30'' left with towel  Quad sets: 10x10'' holds, towel under left knee  Straight leg raise: 2x15 left  Supine dorsiflexion/eversion/inversion: yellow band next  Sidelying hip abduction: 2x10 left, 1#  Supine AAROM heel slide: 10x10'' holds, left with straps  Seated heel slides: 20x5'' holds, left - resume next    Therapeutic activities for 10 minutes including: all in // bars with knee brace and ankle boot donned  Standing weight shifts: 20x3'' holds  Step to and from with stance on LLE: 12x  Step through gait with Bilateral crutches next visit    Patient Education and Home Exercises     Home Exercises Provided and Patient Education Provided   Education provided:   - proper foot wear  - course of therapy, prognosis  - importance of HEP    Written Home Exercises Provided: yes. Exercises were reviewed and Magda was able to demonstrate them prior to the end of the session.  Magda demonstrated good  understanding of the education provided. See EMR under Patient Instructions for exercises provided during therapy sessions    ASSESSMENT     Magda is a 27 y.o. female referred to outpatient Physical Therapy with a medical diagnosis of Left Bimalleolar ankle fracture and Closed dislocation of left patella presenting to PT at [facility]. She received a left ankle ORIF on 1/6/23 and was non-operative knee immobilizer for left lateral patellar dislocation (reduced at external rotation visit on say of injury 12/29/22.  Improved left knee passive flexion to 75 degrees and active to 70 degrees today compared to 32 degrees passive and under 30 degrees active on evaluation. Mild left later ankle pain with passive eversion, limited dorsiflexion that is improving passively as well, and limited eversion>inversion present. Moderate fear and avoidance with LLE WB with mild improvement with extra crutch use. Progress weight bearing and add in gait  training with Bilateral crutches to discharge wheelchair.     Magda Is progressing well towards her goals.   Pt prognosis is Excellent.     Pt will continue to benefit from skilled outpatient physical therapy to address the deficits listed in the problem list box on initial evaluation, provide pt/family education and to maximize pt's level of independence in the home and community environment.     Pt's spiritual, cultural and educational needs considered and pt agreeable to plan of care and goals.     Anticipated barriers to physical therapy: comorbidities, fear and avoidance of left ankle/knee ROM    Goals:   GOALS: Short Term Goals:  4 weeks  1. Report decreased left ankle and knee pain </= 3/10 with standing and walking with AD to increase tolerance for ADLs.  2. Increase left ankle AROM by 5 degrees in order to walk with min to no compensation.  3. Pt will demo good sitting and standing posture for improved spine and joint alignment for improved biomechanics.  4. Pt to tolerate HEP to improve ROM and independence with ADL's.  5. Pt will perform standing squat without AD with equal weight bearing on each lower extremity for standing ADLs.      Long Term Goals: 8 weeks  1. Report decreased left ankle and knee pain </= 1/10 with walking and stair negotiation without AD to increase tolerance for increased QoL and improved ADLs.  2. Patient goal: to get back to walking normally.  3. Increase strength to >/= 4+/5 for BLE to increase tolerance for ADL and work activities.  4. Pt will report at </= 35% impaired on ANKLE FOTO score to demo increased functional mobility.  5. Pt will be able to ambulate community distances and negotiate stairs with minimal ankle pain for increased functional mobility and QoL.    PLAN     Cont per POC, progress WBAT on LLE with AD    Yimi Medina, PT

## 2023-02-24 ENCOUNTER — CLINICAL SUPPORT (OUTPATIENT)
Dept: REHABILITATION | Facility: HOSPITAL | Age: 28
End: 2023-02-24
Payer: OTHER MISCELLANEOUS

## 2023-02-24 DIAGNOSIS — R53.1 DECREASED STRENGTH: ICD-10-CM

## 2023-02-24 DIAGNOSIS — M25.672 DECREASED RANGE OF MOTION OF LEFT ANKLE: Primary | ICD-10-CM

## 2023-02-24 PROCEDURE — 97140 MANUAL THERAPY 1/> REGIONS: CPT | Mod: PN

## 2023-02-24 PROCEDURE — 97530 THERAPEUTIC ACTIVITIES: CPT | Mod: PN

## 2023-02-24 PROCEDURE — 97110 THERAPEUTIC EXERCISES: CPT | Mod: PN

## 2023-02-24 NOTE — PROGRESS NOTES
[facility]   Physical Therapy Treatment Note     Name: Magda Lowery  Clinic Number: [id number]    Therapy Diagnosis:   Encounter Diagnoses   Name Primary?    Decreased range of motion of left ankle Yes    Decreased strength      Physician: Liudmila Monge PA-C    Visit Date: 2/24/2023    Physician Orders: PT Eval and Treat  Medical Diagnosis from Referral:   S82.842A (ICD-10-CM) - Bimalleolar ankle fracture, left, closed, initial encounter   S83.005D (ICD-10-CM) - Closed dislocation of left patella, subsequent encounter      Evaluation Date: 2/17/2023  Authorization Period Expiration: 02/06/2024  Plan of Care Expiration: 4/14/23  Progress Note Due: 3/14/23  Visit # / Visits authorized: 1/1, 2/20  FOTO: 3/5     Precautions: Standard, asthma, hypothyroidism     Time In: 11:00 AM  Time Out: 12:12 PM  Total Billable Time: 72 minutes (3 TE, 1 TA, 1 MT)    SUBJECTIVE     Pt reports: some soreness in left ankle at surgery site only when she puts weight on it, primarily laterally. Repots has not really tried to walk at home until she gets more comfortable and able to within therapy. Is doing much better with t/f from her WC to chairs at home.  Also reports sometimes her foot will get numb if she is on it too long, and has tried standing without brace or boot donned.   She was compliant with home exercise program.  Response to previous treatment: 1st after  Functional change: 1st after    Pain: 0/10  Location: left ankle  Pain: 1/10, when stretching  Location: left knee      OBJECTIVE     Objective Measures updated at progress report unless specified.     2/24/2023  L DF AROM 2* upon arrival   L knee flexion AAROM 84* post there-ex    Treatment     Magda received the treatments listed below:      manual therapy techniques: Joint mobilizations, Manual traction, Myofacial release, Soft tissue Mobilization, and Friction Massage were applied to the: left knee and ankle for 15 minutes,  "including:  Gentle left TCJ distraction, grade I-II  Medial/lateral calcaneal inversion/eversion mobs, grade II-III  Left calf stretch  Medial and superior patellar glides, grade I-II, left  Ankle PROM     therapeutic exercises to develop strength, endurance, ROM, flexibility, posture, and core stabilization for 42 minutes including:  Ankle circles: 30x Bilateral   Calf stretch: 2x30'' left with towel  Quad sets: 10x10'' holds, towel under left knee  Supine dorsiflexion/eversion/inversion: YTB 2x10 each with 3" hold  Straight leg raise: 2x15 left with 1" pause at apex of motion  Sidelying hip abduction: 2x10 left, 1# with 1" pause at apex of motion  Supine AAROM heel slide: 15x10'' holds, left with straps  Seated heel slides: 20x5'' holds, left     Therapeutic activities for 15 minutes including: all in // bars with knee brace and ankle boot donned  Standing weight shifts: 20x3'' holds lateral and diagonal   Step to and from with stance on LLE: 12x  Facing mat, lateral weight shift to the LLE, then lifting the R LE... working up from point of lift off of RLE then pause x90" then lift as tolerated for 90" to limit anticipatory reactions  Step through gait with Bilateral crutches next visit when able to tolerate    Patient Education and Home Exercises     Home Exercises Provided and Patient Education Provided   Education provided:   - proper foot wear  - course of therapy, prognosis  - importance of HEP    Written Home Exercises Provided: yes. Exercises were reviewed and Magda was able to demonstrate them prior to the end of the session.  Magda demonstrated good  understanding of the education provided. See EMR under Patient Instructions for exercises provided during therapy sessions    ASSESSMENT     Magda is a 27 y.o. female referred to outpatient Physical Therapy with a medical diagnosis of Left Bimalleolar ankle fracture and Closed dislocation of left patella presenting to PT at [facility]. She received a left ankle ORIF on 1/6/23 and was non-operative knee immobilizer for left lateral patellar dislocation (reduced at external rotation visit on say of injury 12/29/22.    Improved left knee flexion AAROM to 84* and left ankle DF AAROM to 2* pre session today. This marked positive carry over from last visit and consistent with pt reporting compliance to HEP. Mild left later ankle pain with passive eversion, limited dorsiflexion that is improving passively as well, and limited eversion>inversion present. Did add ankle 4 way with YTB and provided YTB for HEP as well.    Moderate fear and avoidance with LLE WB with mild improvement with extra crutch use. Progress weight bearing and add in gait training with Bilateral crutches to discharge wheelchair.     Magda Is progressing well towards her goals.   Pt prognosis is Excellent.     Pt will continue to benefit from skilled outpatient physical therapy to address the deficits listed in the problem list box on initial evaluation, provide pt/family education and to maximize pt's level of independence in the home and community environment.     Pt's spiritual, cultural and educational needs considered and pt agreeable to plan of care and goals.     Anticipated barriers to physical therapy: comorbidities, fear and avoidance of left ankle/knee ROM    Goals:   GOALS: Short Term Goals:  4 weeks  1. Report decreased left ankle and knee pain </= 3/10 with standing and walking with AD to increase tolerance for ADLs.  2. Increase left ankle AROM by 5 degrees in order to walk with min to no compensation.  3. Pt will demo good sitting and standing posture for improved spine and joint alignment for improved biomechanics.  4. Pt to tolerate HEP to improve ROM and independence with ADL's.  5. Pt will perform standing squat without AD with equal weight bearing on each lower extremity for standing ADLs.      Long Term Goals: 8 weeks  1. Report decreased left ankle and  knee pain </= 1/10 with walking and stair negotiation without AD to increase tolerance for increased QoL and improved ADLs.  2. Patient goal: to get back to walking normally.  3. Increase strength to >/= 4+/5 for BLE to increase tolerance for ADL and work activities.  4. Pt will report at </= 35% impaired on ANKLE FOTO score to demo increased functional mobility.  5. Pt will be able to ambulate community distances and negotiate stairs with minimal ankle pain for increased functional mobility and QoL.    PLAN     Cont per POC, progress WBAT on LLE with AD    Ed Fernandez, PT

## 2023-02-27 ENCOUNTER — CLINICAL SUPPORT (OUTPATIENT)
Dept: REHABILITATION | Facility: HOSPITAL | Age: 28
End: 2023-02-27
Payer: COMMERCIAL

## 2023-02-27 DIAGNOSIS — R53.1 DECREASED STRENGTH: ICD-10-CM

## 2023-02-27 DIAGNOSIS — M25.672 DECREASED RANGE OF MOTION OF LEFT ANKLE: Primary | ICD-10-CM

## 2023-02-27 PROCEDURE — 97140 MANUAL THERAPY 1/> REGIONS: CPT | Mod: PN

## 2023-02-27 PROCEDURE — 97110 THERAPEUTIC EXERCISES: CPT | Mod: PN

## 2023-02-27 PROCEDURE — 97530 THERAPEUTIC ACTIVITIES: CPT | Mod: PN

## 2023-02-27 NOTE — PROGRESS NOTES
[facility]   Physical Therapy Treatment Note     Name: Magda Lowery  [facility] Number: [id number]    Therapy Diagnosis:   Encounter Diagnoses   Name Primary?    Decreased range of motion of left ankle Yes    Decreased strength      Physician: Liudmila Monge PA-C    Visit Date: 2/27/2023    Physician Orders: PT Eval and Treat  Medical Diagnosis from Referral:   S82.842A (ICD-10-CM) - Bimalleolar ankle fracture, left, closed, initial encounter   S83.005D (ICD-10-CM) - Closed dislocation of left patella, subsequent encounter      Evaluation Date: 2/17/2023  Authorization Period Expiration: 02/06/2024  Plan of Care Expiration: 4/14/23  Progress Note Due: 3/14/23  Visit # / Visits authorized: 1/1, 3/20  FOTO: 4/5     Precautions: Standard, asthma, hypothyroidism, left knee range of motion as tolerated, left ankle WBAT with boot    Time In: 1:05 PM  Time Out: 2:00 PM  Total Billable Time: 55 minutes (2 TE, 1 TA, 1 MT)    SUBJECTIVE     Pt reports: she is able to move her left leg better and did some standing at work with her braces on.                                                                                        She was compliant with home exercise program.  Response to previous treatment: 1st after  Functional change: 1st after    Pain: 0/10  Location: left ankle  Pain: 1/10, when stretching  Location: left knee      OBJECTIVE     Objective Measures updated at progress report unless specified.     2/27/2023  L DF AROM 2* upon arrival   L knee flexion AAROM 95* post there-ex    Treatment     Magda received the treatments listed below:      manual therapy techniques: Joint mobilizations, Manual traction, Myofacial release, Soft tissue Mobilization, and Friction Massage were applied to the: left knee and ankle for 15 minutes, including:  Gentle left TCJ distraction, grade I-II  Medial/lateral calcaneal inversion/eversion mobs, grade II-III  Left calf stretch  Medial and superior patellar  "luis a, grade I-II, left  Ankle PROM     therapeutic exercises to develop strength, endurance, ROM, flexibility, posture, and core stabilization for 25 minutes including:  Ankle circles: 30x Bilateral   Calf stretch: 2x30'' left with towel  Supine dorsiflexion/eversion/inversion: YTB 2x10 each with 3" hold (red theraband next)  Straight leg raise: 2x8 left, 1#  Sidelying hip abduction: 2x12 left, 1# with 1" pause at apex of motion  Supine AAROM heel slide: 10x10'' holds, left with straps    Therapeutic activities for 15 minutes including: all in // bars with knee brace and ankle boot donned  Standing weight shifts: 20x3'' holds lateral and diagonal   Step to and from with stance on LLE: 12x  Facing mat, lateral weight shift to the LLE, then lifting the R LE... working up from point of lift off of RLE then pause x90" then lift as tolerated for 90" to limit anticipatory reactions  Step through gait with Bilateral crutches next visit when able to tolerate    Patient Education and Home Exercises     Home Exercises Provided and Patient Education Provided   Education provided:   - proper foot wear  - course of therapy, prognosis  - importance of HEP    Written Home Exercises Provided: yes. Exercises were reviewed and Magda was able to demonstrate them prior to the end of the session.  Magda demonstrated good  understanding of the education provided. See EMR under Patient Instructions for exercises provided during therapy sessions    ASSESSMENT     Magda is a 27 y.o. female referred to outpatient Physical Therapy with a medical diagnosis of Left Bimalleolar ankle fracture and Closed dislocation of left patella presenting to PT at [facility]. She received a left ankle ORIF on 1/6/23 and was non-operative knee immobilizer for left lateral patellar dislocation (reduced at external rotation visit on say of injury 12/29/22.  Improved left knee AAROM flexion to 95 degrees today and tolerating extra " weight resistance. She was able to perform long sitting straight leg raise today to don her brace at end of session. She was instructed to remove brace when sitting to allow for more consistent knee flexion and don with any standing activities. Improving weight bearing activities and tolerance; initiate gait training next visit with Bilateral crutches.     Moderate fear and avoidance with LLE WB with mild improvement with extra crutch use. Progress weight bearing and add in gait training with Bilateral crutches to discharge wheelchair.     Magda Is progressing well towards her goals.   Pt prognosis is Excellent.     Pt will continue to benefit from skilled outpatient physical therapy to address the deficits listed in the problem list box on initial evaluation, provide pt/family education and to maximize pt's level of independence in the home and community environment.     Pt's spiritual, cultural and educational needs considered and pt agreeable to plan of care and goals.     Anticipated barriers to physical therapy: comorbidities, fear and avoidance of left ankle/knee ROM    Goals:   GOALS: Short Term Goals:  4 weeks  1. Report decreased left ankle and knee pain </= 3/10 with standing and walking with AD to increase tolerance for ADLs.  2. Increase left ankle AROM by 5 degrees in order to walk with min to no compensation.  3. Pt will demo good sitting and standing posture for improved spine and joint alignment for improved biomechanics.  4. Pt to tolerate HEP to improve ROM and independence with ADL's.  5. Pt will perform standing squat without AD with equal weight bearing on each lower extremity for standing ADLs.      Long Term Goals: 8 weeks  1. Report decreased left ankle and knee pain </= 1/10 with walking and stair negotiation without AD to increase tolerance for increased QoL and improved ADLs.  2. Patient goal: to get back to walking normally.  3. Increase strength to >/= 4+/5 for BLE to increase tolerance  for ADL and work activities.  4. Pt will report at </= 35% impaired on ANKLE FOTO score to demo increased functional mobility.  5. Pt will be able to ambulate community distances and negotiate stairs with minimal ankle pain for increased functional mobility and QoL.    PLAN     Cont per POC, progress WBAT on LLE with AD with boot donned.    Yimi Medina, PT

## 2023-03-02 ENCOUNTER — CLINICAL SUPPORT (OUTPATIENT)
Dept: REHABILITATION | Facility: HOSPITAL | Age: 28
End: 2023-03-02
Payer: COMMERCIAL

## 2023-03-02 DIAGNOSIS — R53.1 DECREASED STRENGTH: ICD-10-CM

## 2023-03-02 DIAGNOSIS — M25.672 DECREASED RANGE OF MOTION OF LEFT ANKLE: Primary | ICD-10-CM

## 2023-03-02 PROCEDURE — 97140 MANUAL THERAPY 1/> REGIONS: CPT | Mod: PN | Performed by: PHYSICAL THERAPIST

## 2023-03-02 PROCEDURE — 97116 GAIT TRAINING THERAPY: CPT | Mod: PN | Performed by: PHYSICAL THERAPIST

## 2023-03-02 PROCEDURE — 97110 THERAPEUTIC EXERCISES: CPT | Mod: PN | Performed by: PHYSICAL THERAPIST

## 2023-03-02 PROCEDURE — 97530 THERAPEUTIC ACTIVITIES: CPT | Mod: PN | Performed by: PHYSICAL THERAPIST

## 2023-03-02 NOTE — PROGRESS NOTES
[facility]   Physical Therapy Treatment Note     Name: Magda Lowery  [facility] Number: [id number]    Therapy Diagnosis:   Encounter Diagnoses   Name Primary?    Decreased range of motion of left ankle Yes    Decreased strength      Physician: Liudmila Monge PA-C    Visit Date: 3/2/2023    Physician Orders: PT Eval and Treat  Medical Diagnosis from Referral:   S82.842A (ICD-10-CM) - Bimalleolar ankle fracture, left, closed, initial encounter   S83.005D (ICD-10-CM) - Closed dislocation of left patella, subsequent encounter      Evaluation Date: 2/17/2023  Authorization Period Expiration: 02/06/2024  Plan of Care Expiration: 4/14/23  Progress Note Due: 3/14/23  Visit # / Visits authorized: 4/20 (+1)  FOTO: 5/5     Precautions: Standard, asthma, hypothyroidism, left knee range of motion as tolerated, left ankle WBAT with boot    Time In: 10:00 AM  Time Out: 11:00 AM  Total Billable Time: 56 minutes (2 TE, 1 TA, 1 MT, 1 GT)    SUBJECTIVE     Pt reports: she felt like she couldn't bend her knee as far yesterday as she could last visit                                                                                  She was compliant with home exercise program.  Response to previous treatment: 1st after  Functional change: 1st after    Pain: 0/10  Location: left ankle  Pain: 1/10, when stretching  Location: left knee      OBJECTIVE     Objective Measures updated at progress report unless specified.     3/2/2023  L knee flexion AAROM 87* pre there-ex    Treatment     Magda received the treatments listed below:      manual therapy techniques: Joint mobilizations, Manual traction, Myofacial release, Soft tissue Mobilization, and Friction Massage were applied to the: left knee and ankle for 8 minutes, including:  Gentle left TCJ distraction, grade I-II  Medial/lateral calcaneal inversion/eversion mobs, grade II-III  Left calf stretch  Medial and superior patellar glides, grade I-II, left  Ankle PROM    "  therapeutic exercises to develop strength, endurance, ROM, flexibility, posture, and core stabilization for 25 minutes including:  Ankle circles: 30x Bilateral   Calf stretch: 3x30'' left with towel  Supine dorsiflexion/eversion/inversion: RTB 2x10 each with 3" hold   Straight leg raise: 2x10 left, 1#      Not today  Sidelying hip abduction: 2x12 left, 1# with 1" pause at apex of motion  Supine AAROM heel slide: 10x10'' holds, left with straps    Therapeutic activities for 15 minutes including: all in // bars with knee brace and ankle boot donned  Standing weight shifts: 10x3'' holds lateral only today with crutches prior to gait (and diagonal previous)   Standing weight shifts without upper extremity: 20x3" holds lateral and diagonal (left in front)  Step to and from with stance on LLE: 15x (1 step with each lower extremity forward and back in // bars)    Not today:  Facing mat, lateral weight shift to the LLE, then lifting the R LE... working up from point of lift off of RLE then pause x90" then lift as tolerated for 90" to limit anticipatory reactions    Gait training for 8 minutes:  Gait: 60 ft x 2 with Bilateral Axillary crutches working on step-through pattern    Patient Education and Home Exercises     Home Exercises Provided and Patient Education Provided   Education provided:   - proper foot wear  - course of therapy, prognosis  - importance of home exercise program  - benefit of even up for right shoe    Written Home Exercises Provided: yes. Exercises were reviewed and Magda was able to demonstrate them prior to the end of the session.  Magda demonstrated good  understanding of the education provided. See EMR under Patient Instructions for exercises provided during therapy sessions    ASSESSMENT     Magda is a 27 y.o. female referred to outpatient Physical Therapy with a medical diagnosis of Left Bimalleolar ankle fracture and Closed dislocation of left patella presenting to PT at [facility] and " [facility]. She received a left ankle ORIF on 1/6/23 and was non-operative knee immobilizer for left lateral patellar dislocation (reduced at external rotation visit on say of injury 12/29/22.  Knee flexion limited compared to last visit today, but WB tolerance improving. Able to ambulate 60 ft x2 with Bilateral axillary crutches with progression to a step-through pattern. Rec purchase of even up for right shoe to improve gait pattern.     Magda Is progressing well towards her goals.   Pt prognosis is Excellent.     Pt will continue to benefit from skilled outpatient physical therapy to address the deficits listed in the problem list box on initial evaluation, provide pt/family education and to maximize pt's level of independence in the home and community environment.     Pt's spiritual, cultural and educational needs considered and pt agreeable to plan of care and goals.     Anticipated barriers to physical therapy: comorbidities, fear and avoidance of left ankle/knee ROM    Goals:   GOALS: Short Term Goals:  4 weeks  1. Report decreased left ankle and knee pain </= 3/10 with standing and walking with AD to increase tolerance for ADLs. PROGRESSING; NOT MET  2. Increase left ankle AROM by 5 degrees in order to walk with min to no compensation. PROGRESSING; NOT MET  3. Pt will demo good sitting and standing posture for improved spine and joint alignment for improved biomechanics. PROGRESSING; NOT MET  4. Pt to tolerate HEP to improve ROM and independence with ADL's. MET  5. Pt will perform standing squat without AD with equal weight bearing on each lower extremity for standing ADLs.  PROGRESSING; NOT MET     Long Term Goals: 8 weeks  1. Report decreased left ankle and knee pain </= 1/10 with walking and stair negotiation without AD to increase tolerance for increased QoL and improved ADLs. PROGRESSING; NOT MET  2. Patient goal: to get back to walking normally. PROGRESSING; NOT MET  3. Increase strength to >/=  4+/5 for BLE to increase tolerance for ADL and work activities. PROGRESSING; NOT MET  4. Pt will report at </= 35% impaired on ANKLE FOTO score to demo increased functional mobility. PROGRESSING; NOT MET  5. Pt will be able to ambulate community distances and negotiate stairs with minimal ankle pain for increased functional mobility and QoL. PROGRESSING; NOT MET    PLAN     Cont per POC, progress WBAT on LLE with AD with boot donned.    Berhane Masters, PT

## 2023-03-07 ENCOUNTER — CLINICAL SUPPORT (OUTPATIENT)
Dept: REHABILITATION | Facility: HOSPITAL | Age: 28
End: 2023-03-07
Payer: COMMERCIAL

## 2023-03-07 DIAGNOSIS — R53.1 DECREASED STRENGTH: ICD-10-CM

## 2023-03-07 DIAGNOSIS — M25.672 DECREASED RANGE OF MOTION OF LEFT ANKLE: Primary | ICD-10-CM

## 2023-03-07 PROCEDURE — 97116 GAIT TRAINING THERAPY: CPT | Mod: PN

## 2023-03-07 PROCEDURE — 97110 THERAPEUTIC EXERCISES: CPT | Mod: PN

## 2023-03-07 PROCEDURE — 97530 THERAPEUTIC ACTIVITIES: CPT | Mod: PN

## 2023-03-07 NOTE — PROGRESS NOTES
[facility]   Physical Therapy Treatment Note     Name: Magda Lowery  [facility] Number: [id number]    Therapy Diagnosis:   Encounter Diagnoses   Name Primary?    Decreased range of motion of left ankle Yes    Decreased strength      Physician: Liudmila Monge PA-C    Visit Date: 3/7/2023    Physician Orders: PT Eval and Treat  Medical Diagnosis from Referral:   S82.842A (ICD-10-CM) - Bimalleolar ankle fracture, left, closed, initial encounter   S83.005D (ICD-10-CM) - Closed dislocation of left patella, subsequent encounter      Evaluation Date: 2/17/2023  Authorization Period Expiration: 02/06/2024  Plan of Care Expiration: 4/14/23  Progress Note Due: 3/14/23  Visit # / Visits authorized: 5/20 (+1)  FOTO: 6/5 - Next     Precautions: Standard, asthma, hypothyroidism, left knee range of motion as tolerated, left ankle WBAT with boot    Time In: 11:05 AM  Time Out: 12:00 PM  Total Billable Time: 55 minutes (2 TE, 1 TA, 1 GT)    SUBJECTIVE     Pt reports: felt that her knee is tighter than normal, still stretching at home and using crutches to get around the house more.                                                                                She was compliant with home exercise program.  Response to previous treatment: increased LLE WB   Functional change: walking more    Pain: 0/10  Location: left ankle  Pain: 1/10, when stretching  Location: left knee      OBJECTIVE     Objective Measures updated at progress report unless specified.     3/2/2023  L knee flexion AAROM 87* pre there-ex    Treatment     Magda received the treatments listed below:      manual therapy techniques: Joint mobilizations, Manual traction, Myofacial release, Soft tissue Mobilization, and Friction Massage were applied to the: left knee and ankle for 00 minutes, including:  Gentle left TCJ distraction, grade I-II  Medial/lateral calcaneal inversion/eversion mobs, grade II-III  Left calf stretch  Medial and superior  "patellar glides, grade I-II, left  Ankle PROM     therapeutic exercises to develop strength, endurance, ROM, flexibility, posture, and core stabilization for 25 minutes including:  Ankle circles: 30x Bilateral   Calf stretch: 3x30'' left with towel - perform standing in shoe next  Supine dorsiflexion/eversion/inversion: RTB 3x10 each with 3" hold   Straight leg raise: 3x10 left, 1#  Prone quad stretch: 5x30'' with strap    Not today  Sidelying hip abduction: 2x12 left, 1# with 1" pause at apex of motion  Supine AAROM heel slide: 10x10'' holds, left with straps    Therapeutic activities for 10 minutes including: all in // bars with knee brace and ankle boot donned  Standing weight shifts without upper extremity: 20x3" holds lateral and diagonal (left in front)  Step to and from with stance on LLE: 15x (1 step with each lower extremity forward and back in // bars)  Toe taps on 4 inch step: 20x with stance on LLE     Gait training for 20 minutes: with Bilateral crutches and only boot donned  Gait: 150 ft x 2 with Bilateral Axillary crutches working on step-through pattern on level ground inside           100 ft x 2 with Bilateral crutches on unlevel surfaces including ramp  Lateral walking in // bars: 4 laps with minimal upper extremity use  Forward walking in // bars: 4 laps with minimal upper extremity use    Patient Education and Home Exercises     Home Exercises Provided and Patient Education Provided   Education provided:   - proper foot wear  - course of therapy, prognosis  - importance of home exercise program  - benefit of even up for right shoe    Written Home Exercises Provided: yes. Exercises were reviewed and Magda was able to demonstrate them prior to the end of the session.  Magda demonstrated good  understanding of the education provided. See EMR under Patient Instructions for exercises provided during therapy sessions    ASSESSMENT     Magda is a 27 y.o. female referred to outpatient Physical Therapy " with a medical diagnosis of Left Bimalleolar ankle fracture and Closed dislocation of left patella presenting to PT at [facility]. She received a left ankle ORIF on 1/6/23 and was non-operative knee immobilizer for left lateral patellar dislocation (reduced at external rotation visit on say of injury 12/29/22.  Increased passive knee flexion to 103 degrees and still has full knee extension. Educated on importance of exercises at home, discharged knee immobilizer when sitting at all times, discharged knee immobilizer when walking with crutches at home, discharge w/c at home (can only use in community for long distances), and still using boot at all times in weight bearing. She did well today and fear and avoidance largest barrier at this time. Increased weight for mat activities, can add in partial weight bearing knee flexion (tell patient to bring shoe next visit), and advance standing activities.     Magda Is progressing well towards her goals.   Pt prognosis is Excellent.     Pt will continue to benefit from skilled outpatient physical therapy to address the deficits listed in the problem list box on initial evaluation, provide pt/family education and to maximize pt's level of independence in the home and community environment.     Pt's spiritual, cultural and educational needs considered and pt agreeable to plan of care and goals.     Anticipated barriers to physical therapy: comorbidities, fear and avoidance of left ankle/knee ROM    Goals:   GOALS: Short Term Goals:  4 weeks  1. Report decreased left ankle and knee pain </= 3/10 with standing and walking with AD to increase tolerance for ADLs. PROGRESSING; NOT MET  2. Increase left ankle AROM by 5 degrees in order to walk with min to no compensation. PROGRESSING; NOT MET  3. Pt will demo good sitting and standing posture for improved spine and joint alignment for improved biomechanics. PROGRESSING; NOT MET  4. Pt to tolerate HEP to  improve ROM and independence with ADL's. MET  5. Pt will perform standing squat without AD with equal weight bearing on each lower extremity for standing ADLs.  PROGRESSING; NOT MET     Long Term Goals: 8 weeks  1. Report decreased left ankle and knee pain </= 1/10 with walking and stair negotiation without AD to increase tolerance for increased QoL and improved ADLs. PROGRESSING; NOT MET  2. Patient goal: to get back to walking normally. PROGRESSING; NOT MET  3. Increase strength to >/= 4+/5 for BLE to increase tolerance for ADL and work activities. PROGRESSING; NOT MET  4. Pt will report at </= 35% impaired on ANKLE FOTO score to demo increased functional mobility. PROGRESSING; NOT MET  5. Pt will be able to ambulate community distances and negotiate stairs with minimal ankle pain for increased functional mobility and QoL. PROGRESSING; NOT MET    PLAN     Cont per POC, progress WBAT on LLE with AD with boot donned.    Yimi Medina, PT

## 2023-03-09 ENCOUNTER — CLINICAL SUPPORT (OUTPATIENT)
Dept: REHABILITATION | Facility: HOSPITAL | Age: 28
End: 2023-03-09
Payer: COMMERCIAL

## 2023-03-09 DIAGNOSIS — M25.672 DECREASED RANGE OF MOTION OF LEFT ANKLE: Primary | ICD-10-CM

## 2023-03-09 DIAGNOSIS — R53.1 DECREASED STRENGTH: ICD-10-CM

## 2023-03-09 PROCEDURE — 97530 THERAPEUTIC ACTIVITIES: CPT | Mod: PN,CQ

## 2023-03-09 PROCEDURE — 97116 GAIT TRAINING THERAPY: CPT | Mod: PN,CQ

## 2023-03-09 PROCEDURE — 97110 THERAPEUTIC EXERCISES: CPT | Mod: PN,CQ

## 2023-03-09 NOTE — PROGRESS NOTES
[facility]   Physical Therapy Treatment Note     Name: Magda Lowery  [facility] Number: [id number]    Therapy Diagnosis:   Encounter Diagnoses   Name Primary?    Decreased range of motion of left ankle Yes    Decreased strength      Physician: Liudmila Monge PA-C    Visit Date: 3/9/2023    Physician Orders: PT Eval and Treat  Medical Diagnosis from Referral:   S82.842A (ICD-10-CM) - Bimalleolar ankle fracture, left, closed, initial encounter   S83.005D (ICD-10-CM) - Closed dislocation of left patella, subsequent encounter      Evaluation Date: 2/17/2023  Authorization Period Expiration: 02/06/2024  Plan of Care Expiration: 4/14/23  Progress Note Due: 3/14/23  Visit # / Visits authorized: 5/20 (+1)  FOTO: 6/5 - Next   PTA visit 1/6    Precautions: Standard, asthma, hypothyroidism, left knee range of motion as tolerated, left ankle WBAT with boot    Time In: 1050 AM  Time Out: 11:55 PM  Total Billable Time: 55 minutes (2 TE, 1 TA, 1 GT)    SUBJECTIVE     Pt reports: Patient reports 0/10  ankle pain and 2/10 alexandre pain currently. Patient relates knee pain likely due to having it bent while sitting in chair.                                                              She was compliant with home exercise program.  Response to previous treatment: increased LLE WB   Functional change: walking more    Pain: 0/10  Location: left ankle  Pain: 1/10, when stretching  Location: left knee      OBJECTIVE     Objective Measures updated at progress report unless specified.     3/2/2023  L knee flexion AAROM 87* pre there-ex    Treatment     Magda received the treatments listed below:      manual therapy techniques: Joint mobilizations, Manual traction, Myofacial release, Soft tissue Mobilization, and Friction Massage were applied to the: left knee and ankle for 00 minutes, including:  Gentle left TCJ distraction, grade I-II  Medial/lateral calcaneal inversion/eversion mobs, grade II-III  Left calf  "stretch  Medial and superior patellar glides, grade I-II, left  Ankle PROM     therapeutic exercises to develop strength, endurance, ROM, flexibility, posture, and core stabilization for 25 minutes including:  Ankle circles: 30x Bilateral   Calf stretch: 3x30'' left with towel - perform standing in shoe next  Supine dorsiflexion/eversion/inversion: RTB 3x10 each with 3" hold   Straight leg raise: 3x10 left, 1#  Prone quad stretch: 5x30'' with strap    Not today  Sidelying hip abduction: 2x12 left, 1# with 1" pause at apex of motion  Supine AAROM heel slide: 10x10'' holds, left with straps    Therapeutic activities for 10 minutes including: a ankle boot donned  Standing weight shifts without upper extremity: 20x3" holds lateral and diagonal (left in front)  Step to and from with stance on LLE: 20x (1 step with each lower extremity forward and back in // bars)  Toe taps on 4 inch step: 20x with stance on LLE  (VC to avoid weight shift onto RLE on step)     Gait training for 20 minutes: with Bilateral crutches and only boot donned  -Gait 150 ft x 2 with Bilateral Axillary crutches working on step-through pattern on level ground inside  -Gait 150 ft c 1 crutch donned working on sequencing and stability of L stance.    -Lateral walking in // bars: 4 laps with minimal upper extremity use  -Forward walking in // bars: 4 laps with minimal upper extremity use  Not Today:   100 ft x 2 with Bilateral crutches on unlevel surfaces including ramp    Patient Education and Home Exercises     Home Exercises Provided and Patient Education Provided   Education provided:   - proper foot wear  - course of therapy, prognosis  - importance of home exercise program  - benefit of even up for right shoe    Written Home Exercises Provided: yes. Exercises were reviewed and Magda was able to demonstrate them prior to the end of the session.  Magda demonstrated good  understanding of the education provided. See EMR under Patient Instructions for " exercises provided during therapy sessions    ASSESSMENT     Magda is a 27 y.o. female referred to outpatient Physical Therapy with a medical diagnosis of Left Bimalleolar ankle fracture and Closed dislocation of left patella presenting to PT at [facility] and [facility]. She received a left ankle ORIF on 1/6/23 and was non-operative knee immobilizer for left lateral patellar dislocation (reduced at external rotation visit on say of injury 12/29/22.  Patient tolerates tx well today stating knee pain reduced with walking. Progressed ambulation to use 1 crutch in RUE. Patient advised ot continue to use 2 crutches in community but trial 1 crutch at home because she reports feeling stable and secure with single crutch with only issue of consistent sequencing (patient about 90% consistent). Patient does require VC for appropriate form with step taps to limit weight bearing onto step emphasizing weightbearing of LLE     Magda Is progressing well towards her goals.   Pt prognosis is Excellent.     Pt will continue to benefit from skilled outpatient physical therapy to address the deficits listed in the problem list box on initial evaluation, provide pt/family education and to maximize pt's level of independence in the home and community environment.     Pt's spiritual, cultural and educational needs considered and pt agreeable to plan of care and goals.     Anticipated barriers to physical therapy: comorbidities, fear and avoidance of left ankle/knee ROM    Goals:   GOALS: Short Term Goals:  4 weeks  1. Report decreased left ankle and knee pain </= 3/10 with standing and walking with AD to increase tolerance for ADLs. PROGRESSING; NOT MET  2. Increase left ankle AROM by 5 degrees in order to walk with min to no compensation. PROGRESSING; NOT MET  3. Pt will demo good sitting and standing posture for improved spine and joint alignment for improved biomechanics. PROGRESSING; NOT MET  4. Pt to tolerate HEP to  improve ROM and independence with ADL's. MET  5. Pt will perform standing squat without AD with equal weight bearing on each lower extremity for standing ADLs.  PROGRESSING; NOT MET     Long Term Goals: 8 weeks  1. Report decreased left ankle and knee pain </= 1/10 with walking and stair negotiation without AD to increase tolerance for increased QoL and improved ADLs. PROGRESSING; NOT MET  2. Patient goal: to get back to walking normally. PROGRESSING; NOT MET  3. Increase strength to >/= 4+/5 for BLE to increase tolerance for ADL and work activities. PROGRESSING; NOT MET  4. Pt will report at </= 35% impaired on ANKLE FOTO score to demo increased functional mobility. PROGRESSING; NOT MET  5. Pt will be able to ambulate community distances and negotiate stairs with minimal ankle pain for increased functional mobility and QoL. PROGRESSING; NOT MET    PLAN     Cont per POC, progress WBAT on LLE with AD with boot donned.    Rohith Cisneros, PTA

## 2023-03-13 ENCOUNTER — CLINICAL SUPPORT (OUTPATIENT)
Dept: REHABILITATION | Facility: HOSPITAL | Age: 28
End: 2023-03-13
Payer: COMMERCIAL

## 2023-03-13 DIAGNOSIS — M25.672 DECREASED RANGE OF MOTION OF LEFT ANKLE: Primary | ICD-10-CM

## 2023-03-13 DIAGNOSIS — R53.1 DECREASED STRENGTH: ICD-10-CM

## 2023-03-13 PROCEDURE — 97530 THERAPEUTIC ACTIVITIES: CPT | Mod: PN | Performed by: PHYSICAL THERAPIST

## 2023-03-13 PROCEDURE — 97112 NEUROMUSCULAR REEDUCATION: CPT | Mod: PN | Performed by: PHYSICAL THERAPIST

## 2023-03-13 PROCEDURE — 97110 THERAPEUTIC EXERCISES: CPT | Mod: PN | Performed by: PHYSICAL THERAPIST

## 2023-03-13 NOTE — PROGRESS NOTES
[facility]   Physical Therapy Treatment Note     Name: Magda Lowery  [facility] Number: [id number]    Therapy Diagnosis:   Encounter Diagnoses   Name Primary?    Decreased range of motion of left ankle Yes    Decreased strength      Physician: Liudmila Monge PA-C    Visit Date: 3/13/2023    Physician Orders: PT Eval and Treat  Medical Diagnosis from Referral:   S82.842A (ICD-10-CM) - Bimalleolar ankle fracture, left, closed, initial encounter   S83.005D (ICD-10-CM) - Closed dislocation of left patella, subsequent encounter      Evaluation Date: 2/17/2023  Authorization Period Expiration: 02/06/2024  Plan of Care Expiration: 4/14/23  Progress Note Due: 4/14/23  Visit # / Visits authorized: 7/20 (+1)  FOTO: 8/10 - Next   PTA visit 0/6    Precautions: Standard, asthma, hypothyroidism, left knee range of motion as tolerated, left ankle WBAT with boot    Time In: 1050 AM  Time Out: 11:55 PM  Total Billable Time: 55 minutes (2 TE, 1 TA, 1 GT)    SUBJECTIVE     Pt reports: She's been working on weight shifting while now brushing her teeth in her bathroom instead of kitchen sink in wheelchair.                                                    She was compliant with home exercise program.  Response to previous treatment: increased LLE WB   Functional change: walking more    Pain: 0/10  Location: left ankle  Pain: 0/10, when stretching  Location: left knee      OBJECTIVE     Objective Measures updated at progress report unless specified.     3/13/2023  L knee flexion AAROM 110 pre there-ex    Treatment     Magda received the treatments listed below:      manual therapy techniques: Joint mobilizations, Manual traction, Myofacial release, Soft tissue Mobilization, and Friction Massage were applied to the: left knee and ankle for 00 minutes, including:  Gentle left TCJ distraction, grade I-II  Medial/lateral calcaneal inversion/eversion mobs, grade II-III  Left calf stretch  Medial and superior patellar  "luis a, grade I-II, left  Ankle PROM     therapeutic exercises to develop strength, endurance, ROM, flexibility, posture, and core stabilization for 30 minutes including:  Ankle circles: 30x Bilateral   Calf stretch: 3x30'' left with towel - perform standing in shoe next  Supine dorsiflexion/eversion/inversion: RTB 3x10 each with 3" hold   Prone quad stretch: 5x30'' with strap  Supine AAROM heel slide: 10x10'' holds, left with straps    Not today  Sidelying hip abduction: 2x12 left, 1# with 1" pause at apex of motion  Straight leg raise: 3x10 left, 1#      Therapeutic activities for 10 minutes including: a ankle boot donned  Standing weight shifts without upper extremity: 20x3" holds lateral and diagonal (left in front)  Step to and from with stance on LLE: 20x (1 step with each lower extremity forward and back in // bars)    Not today:  Toe taps on 4 inch step: 20x with stance on LLE  (VC to avoid weight shift onto RLE on step)     Gait training for 15 minutes: with Bilateral crutches and only boot donned  -Gait 150 ft c 1 crutch donned working on sequencing and stability of L stance.    -Lateral walking in // bars: 4 laps with minimal upper extremity use  -Forward walking in // bars: 5 laps with minimal upper extremity use  Not Today:   100 ft x 2 with Bilateral crutches on unlevel surfaces including ramp    Patient Education and Home Exercises     Home Exercises Provided and Patient Education Provided   Education provided:   - proper foot wear  - course of therapy, prognosis  - importance of home exercise program  - benefit of even up for right shoe    Written Home Exercises Provided: yes. Exercises were reviewed and Magda was able to demonstrate them prior to the end of the session.  Magda demonstrated good  understanding of the education provided. See EMR under Patient Instructions for exercises provided during therapy sessions    ASSESSMENT     Magda is a 27 y.o. female referred to outpatient Physical Therapy " with a medical diagnosis of Left Bimalleolar ankle fracture and Closed dislocation of left patella presenting to PT at [facility] and [facility]. She received a left ankle ORIF on 1/6/23 and was non-operative knee immobilizer for left lateral patellar dislocation (reduced at external rotation visit on say of injury 12/29/22.    She arrived today ambulating with Bilateral axillary crutches and even up on right shoe. Significantly improved left knee flexion range of motion (110 degree) today. Improving weight shift with all standing/walking exercises in // bars without any upper extremity use needed. Instructed to increase ambulation with 1 crutch at home. Plan to increase gait training without device next visit.    Magda Is progressing well towards her goals.   Pt prognosis is Excellent.     Pt will continue to benefit from skilled outpatient physical therapy to address the deficits listed in the problem list box on initial evaluation, provide pt/family education and to maximize pt's level of independence in the home and community environment.     Pt's spiritual, cultural and educational needs considered and pt agreeable to plan of care and goals.     Anticipated barriers to physical therapy: comorbidities, fear and avoidance of left ankle/knee ROM    Goals:   GOALS: Short Term Goals:  4 weeks  1. Report decreased left ankle and knee pain </= 3/10 with standing and walking with AD to increase tolerance for ADLs. PROGRESSING; NOT MET  2. Increase left ankle AROM by 5 degrees in order to walk with min to no compensation. PROGRESSING; NOT MET  3. Pt will demo good sitting and standing posture for improved spine and joint alignment for improved biomechanics. PROGRESSING; NOT MET  4. Pt to tolerate HEP to improve ROM and independence with ADL's. MET  5. Pt will perform standing squat without AD with equal weight bearing on each lower extremity for standing ADLs.  PROGRESSING; NOT MET     Long Term Goals: 8  weeks  1. Report decreased left ankle and knee pain </= 1/10 with walking and stair negotiation without AD to increase tolerance for increased QoL and improved ADLs. PROGRESSING; NOT MET  2. Patient goal: to get back to walking normally. PROGRESSING; NOT MET  3. Increase strength to >/= 4+/5 for BLE to increase tolerance for ADL and work activities. PROGRESSING; NOT MET  4. Pt will report at </= 35% impaired on ANKLE FOTO score to demo increased functional mobility. PROGRESSING; NOT MET  5. Pt will be able to ambulate community distances and negotiate stairs with minimal ankle pain for increased functional mobility and QoL. PROGRESSING; NOT MET    PLAN     Cont per POC, progress WBAT on LLE with AD with boot donned.    Berhane Masters, PT

## 2023-03-14 ENCOUNTER — PATIENT MESSAGE (OUTPATIENT)
Dept: ADMINISTRATIVE | Facility: OTHER | Age: 28
End: 2023-03-14
Payer: COMMERCIAL

## 2023-03-16 ENCOUNTER — CLINICAL SUPPORT (OUTPATIENT)
Dept: REHABILITATION | Facility: HOSPITAL | Age: 28
End: 2023-03-16
Payer: COMMERCIAL

## 2023-03-16 DIAGNOSIS — M25.672 DECREASED RANGE OF MOTION OF LEFT ANKLE: Primary | ICD-10-CM

## 2023-03-16 DIAGNOSIS — R53.1 DECREASED STRENGTH: ICD-10-CM

## 2023-03-16 PROCEDURE — 97110 THERAPEUTIC EXERCISES: CPT | Mod: PN,CQ

## 2023-03-16 PROCEDURE — 97530 THERAPEUTIC ACTIVITIES: CPT | Mod: PN,CQ

## 2023-03-16 PROCEDURE — 97112 NEUROMUSCULAR REEDUCATION: CPT | Mod: PN,CQ

## 2023-03-16 NOTE — PROGRESS NOTES
[facility]   Physical Therapy Treatment Note     Name: Magda Lowery  Clinic Number: [id number]    Therapy Diagnosis:   No diagnosis found.    Physician: Liudmila Monge PA-C    Visit Date: 3/16/2023    Physician Orders: PT Eval and Treat  Medical Diagnosis from Referral:   S82.842A (ICD-10-CM) - Bimalleolar ankle fracture, left, closed, initial encounter   S83.005D (ICD-10-CM) - Closed dislocation of left patella, subsequent encounter      Evaluation Date: 2/17/2023  Authorization Period Expiration: 02/06/2024  Plan of Care Expiration: 4/14/23  Progress Note Due: 4/14/23  Visit # / Visits authorized: 9/20 (+1)  FOTO: 9/10 - Next   PTA visit 1/6    Precautions: Standard, asthma, hypothyroidism, left knee range of motion as tolerated, left ankle WBAT with boot    Time In: 1050 AM  Time Out: 11:55 PM  Total Billable Time: 55 minutes (2 TE, 1 TA, 1 GT)    SUBJECTIVE     Pt reports: Patient reports she feels good. Sometimes feels the knee lock straight when walking which takes her by surprise but says its not painful.                She was compliant with home exercise program.  Response to previous treatment: increased LLE WB   Functional change: walking more    Pain: 0/10  Location: left ankle  Pain: 0/10, when stretching  Location: left knee      OBJECTIVE     Objective Measures updated at progress report unless specified.     3/13/2023  L knee flexion AAROM 110 pre there-ex    Treatment     Magda received the treatments listed below:      manual therapy techniques: Joint mobilizations, Manual traction, Myofacial release, Soft tissue Mobilization, and Friction Massage were applied to the: left knee and ankle for 00 minutes, including:  Gentle left TCJ distraction, grade I-II  Medial/lateral calcaneal inversion/eversion mobs, grade II-III  Left calf stretch  Medial and superior patellar glides, grade I-II, left  Ankle PROM     therapeutic exercises to develop strength, endurance, ROM,  "flexibility, posture, and core stabilization for 25 minutes including:  Supine dorsiflexion/eversion/inversion: RTB 3x10 each with 3" hold   Prone quad stretch: 5x30'' with strap  Supine AAROM heel slide: 10x10'' holds, left with straps    Sidelying hip abduction: x24 c Boot Donned  Straight leg raise: 3x10 left c Boot Donned     Not today  Ankle circles: 30x Bilateral   Calf stretch: 3x30'' left with towel - perform standing in shoe next      Therapeutic activities for 15 minutes including: a ankle boot donned  -Standing weight shifts without upper extremity: 20x3" holds lateral and diagonal (left in front)  -Step to and from with stance on LLE: 20x (1 step with each lower extremity forward and back in // bars)  -Toe taps on 6 inch step: 20x with stance on LLE  (VC to avoid weight shift onto RLE on step)   -mini squats 3x10     Gait training for 15 minutes: with No AD this visit and only boot donned  -Gait 100 ft No AD decreased L stance time.   -Lateral walking in // bars: 5 laps with NO  upper extremity use  -Forward walking in // bars: 5 laps with No  UE support.  -Backwards [place] in // bar 5 laps with No UE support      Previous:   100 ft x 1 with Bilateral crutches on unlevel surfaces including ramp    Patient Education and Home Exercises     Home Exercises Provided and Patient Education Provided   Education provided:   - proper foot wear  - course of therapy, prognosis  - importance of home exercise program  - benefit of even up for right shoe    Written Home Exercises Provided: yes. Exercises were reviewed and Magda was able to demonstrate them prior to the end of the session.  Magda demonstrated good  understanding of the education provided. See EMR under Patient Instructions for exercises provided during therapy sessions    ASSESSMENT     Magda is a 27 y.o. female referred to outpatient Physical Therapy with a medical diagnosis of Left Bimalleolar ankle fracture and Closed dislocation of left patella " presenting to PT at [facility] and [facility]. She received a left ankle ORIF on 1/6/23 and was non-operative knee immobilizer for left lateral patellar dislocation (reduced at external rotation visit on say of injury 12/29/22.    Initiated ambulation in clinic s AD with decreased stance time L and compromised mechanics due to boot however patient states she feels good and denies pain or instability. Initiated minisquats for functional mobility and strength. Patient limited in ROM by boot. Patient reports having appointment with MD 3/20 and will ask about transitioning out of boot.     Magda Is progressing well towards her goals.   Pt prognosis is Excellent.     Pt will continue to benefit from skilled outpatient physical therapy to address the deficits listed in the problem list box on initial evaluation, provide pt/family education and to maximize pt's level of independence in the home and community environment.     Pt's spiritual, cultural and educational needs considered and pt agreeable to plan of care and goals.     Anticipated barriers to physical therapy: comorbidities, fear and avoidance of left ankle/knee ROM    Goals:   GOALS: Short Term Goals:  4 weeks  1. Report decreased left ankle and knee pain </= 3/10 with standing and walking with AD to increase tolerance for ADLs. PROGRESSING; NOT MET  2. Increase left ankle AROM by 5 degrees in order to walk with min to no compensation. PROGRESSING; NOT MET  3. Pt will demo good sitting and standing posture for improved spine and joint alignment for improved biomechanics. PROGRESSING; NOT MET  4. Pt to tolerate HEP to improve ROM and independence with ADL's. MET  5. Pt will perform standing squat without AD with equal weight bearing on each lower extremity for standing ADLs.  PROGRESSING; NOT MET     Long Term Goals: 8 weeks  1. Report decreased left ankle and knee pain </= 1/10 with walking and stair negotiation without AD to increase tolerance for  increased QoL and improved ADLs. PROGRESSING; NOT MET  2. Patient goal: to get back to walking normally. PROGRESSING; NOT MET  3. Increase strength to >/= 4+/5 for BLE to increase tolerance for ADL and work activities. PROGRESSING; NOT MET  4. Pt will report at </= 35% impaired on ANKLE FOTO score to demo increased functional mobility. PROGRESSING; NOT MET  5. Pt will be able to ambulate community distances and negotiate stairs with minimal ankle pain for increased functional mobility and QoL. PROGRESSING; NOT MET    PLAN     Cont per POC, progress WBAT on LLE with AD with boot donned.    Rohith Cisneros, PTA

## 2023-03-20 ENCOUNTER — HOSPITAL ENCOUNTER (OUTPATIENT)
Dept: RADIOLOGY | Facility: HOSPITAL | Age: 28
Discharge: HOME | End: 2023-03-20
Attending: PHYSICIAN ASSISTANT
Payer: COMMERCIAL

## 2023-03-20 ENCOUNTER — OFFICE VISIT (OUTPATIENT)
Dept: ORTHOPEDICS | Facility: CLINIC | Age: 28
End: 2023-03-20
Payer: COMMERCIAL

## 2023-03-20 DIAGNOSIS — S82.842A BIMALLEOLAR ANKLE FRACTURE, LEFT, CLOSED, INITIAL ENCOUNTER: Primary | ICD-10-CM

## 2023-03-20 DIAGNOSIS — S82.842A BIMALLEOLAR ANKLE FRACTURE, LEFT, CLOSED, INITIAL ENCOUNTER: ICD-10-CM

## 2023-03-20 DIAGNOSIS — S83.005D CLOSED DISLOCATION OF LEFT PATELLA, SUBSEQUENT ENCOUNTER: ICD-10-CM

## 2023-03-20 PROCEDURE — 73610 XR ANKLE COMPLETE 3 VIEW LEFT: ICD-10-PCS | Mod: 26,LT,, | Performed by: RADIOLOGY

## 2023-03-20 PROCEDURE — 99024 POSTOP FOLLOW-UP VISIT: CPT | Mod: S$GLB,,, | Performed by: PHYSICIAN ASSISTANT

## 2023-03-20 PROCEDURE — 99024 PR POST-OP FOLLOW-UP VISIT: ICD-10-PCS | Mod: S$GLB,,, | Performed by: PHYSICIAN ASSISTANT

## 2023-03-20 PROCEDURE — 73610 X-RAY EXAM OF ANKLE: CPT | Mod: 26,LT,, | Performed by: RADIOLOGY

## 2023-03-20 PROCEDURE — 73610 X-RAY EXAM OF ANKLE: CPT | Mod: TC,LT

## 2023-03-20 NOTE — PROGRESS NOTES
Principal Orthopedic Problem:  Left bimalleolar ankle fracture (lateral and posterior malleoli)                          Status post left patellar dislocation     Relevant Medical History: according to chart    PMHX:  healthy     HPI: Ms. Lowery is  a 27 year old female who presented to the Ed on 12/30/22 with left knee and ankle pain after falling.   RADS:   KNEE: dislocated left patella    Reduced at bedside, knee brace    ANKLE:  minimally displaced lateral malleolar fracture, minimally displaced posterior malleolar fracture, and subtle medial clear space widening.  Normal alignment of the tibiotalar joint.  Stress views of the ankle obtained with bedside fluoroscopy showed widening of the medial gutter with external rotation stress.    01/06/23: :   Open reduction internal fixation left bimalleolar ankle fracture - 39438    Examination under anesthesia left knee    Ms. Lowery is here today for a post-operative visit    Interval History:  she reports that she is doing ok.   she is at home . she is  participating in PT/OT.   Pain is controled.  she is  taking pain medication.    she denies fever, chills, and sweats .     Complications since time of surgery: none     Physical exam:    Patient arrives to exam room: wheelchair boot clean  .  Patient is  accompanied family member    Ankle  Incision is clean, dry and intact.    Healing well no signs of breakdown or infection.   Knee: 0-110    RADS: All pertinent images were reviewed by myself:   ANKLE: Postoperative changes of internal fixation of the distal tibia and fibula identified.  The position alignment is satisfactory and unchanged as compared to the previous study    Assessment:  Post-op visit ( 10 weeks)    Plan:  Current care, treatment plan, precautions, activity level/ modifications, limitations, rehabilitation exercises and proposed future treatment were discussed with the patient. We discussed the need to monitor for changes in symptoms and  condition and report them to the physician.  Discussed importance of compliance with all appointments and follow up examinations.     WOUND CARE ORDERS  - You may use vitamin E (break the capsule open), aloe, scar cream (mederma) or hand lotion to rub the scar.  You must rub across the scar and in the line of the scar.  The goal is to make the scar not tender and make the scar not adhere (stick to) the tissues below the scar.  There may be some mild discomfort with this initially.          ACTIVITY:   - light, slowly increase no high impact   ANKLE:    - ROMAT   - weight bearing as tolerated      -PT/OT, [facility]  , Patient is responsible to establish and continue care      PAIN MEDICATION:   - Multimodal pain control  - Pain medication: refill was not needed, will call if needed   - Pain medication refill policy provided to patient for review, yes.    - Patient was informed a multi-modal approach is used to treat their pain. With the goal to get off of narcotic pain medication and discontinue as soon as possible.   - ice and elevation to reduce pain and swelling     DVT PROPHYLAXIS:   - ASA 81 mg bid: post op regime completed     FOLLOW UP:   - Patient will follow up in the clinic in 12 weeks.  - X-ray of her knee 2 view AP lat and ankle NWB OOB is needed.          If there are any questions prior to scheduled follow up, the patient was instructed to contact the office

## 2023-03-21 ENCOUNTER — CLINICAL SUPPORT (OUTPATIENT)
Dept: REHABILITATION | Facility: HOSPITAL | Age: 28
End: 2023-03-21
Payer: COMMERCIAL

## 2023-03-21 DIAGNOSIS — M25.672 DECREASED RANGE OF MOTION OF LEFT ANKLE: Primary | ICD-10-CM

## 2023-03-21 DIAGNOSIS — R53.1 DECREASED STRENGTH: ICD-10-CM

## 2023-03-21 PROCEDURE — 97112 NEUROMUSCULAR REEDUCATION: CPT | Mod: PN | Performed by: PHYSICAL THERAPIST

## 2023-03-21 PROCEDURE — 97110 THERAPEUTIC EXERCISES: CPT | Mod: PN | Performed by: PHYSICAL THERAPIST

## 2023-03-21 PROCEDURE — 97116 GAIT TRAINING THERAPY: CPT | Mod: PN | Performed by: PHYSICAL THERAPIST

## 2023-03-21 NOTE — PROGRESS NOTES
[facility]   Physical Therapy Treatment Note     Name: Magda Lowery  Clinic Number: [id number]    Therapy Diagnosis:   Encounter Diagnoses   Name Primary?    Decreased range of motion of left ankle Yes    Decreased strength        Physician: Liudmila Monge PA-C    Visit Date: 3/21/2023    Physician Orders: PT Eval and Treat  Medical Diagnosis from Referral:   S82.842A (ICD-10-CM) - Bimalleolar ankle fracture, left, closed, initial encounter   S83.005D (ICD-10-CM) - Closed dislocation of left patella, subsequent encounter      Evaluation Date: 2/17/2023  Authorization Period Expiration: 02/06/2024  Plan of Care Expiration: 4/14/23  Progress Note Due: 4/14/23  Visit # / Visits authorized: 9/20 (+1)  FOTO: 10/10 - Performed 3/21/23   PTA visit 0/6    Precautions: Standard, asthma, hypothyroidism, left knee range of motion as tolerated, left ankle WBAT with boot    Time In: 11:00 AM  Time Out: 12:00 PM  Total Billable Time: 56 minutes (2 TE, 1 NMR, 1 GT)    SUBJECTIVE     Pt reports: she starts work on 3/27 is was told to start wearing her boot and wean out of it. She was told she can have extra rest breaks. She started (and was cleared) to walk in ASO and no boot yesterday.               She was compliant with home exercise program.  Response to previous treatment: increased LLE WB   Functional change: walking more    Pain: 0/10  Location: left ankle  Pain: 0/10, when stretching  Location: left knee      OBJECTIVE     Objective Measures updated at progress report unless specified.     3/21/2023  L knee flexion AROM 118 pre there-ex; PROM 122 degree   Left quad strength via MicroFET: 9.9 kg  Gait: left knee valgus, trendelenberg and dec'd stance with ASO and Bilateral axillary crutches    Treatment     Magda received the treatments listed below:      manual therapy techniques: Joint mobilizations, Manual traction, Myofacial release, Soft tissue Mobilization, and Friction Massage were applied  "to the: left knee and ankle for 00 minutes, including:  Gentle left TCJ distraction, grade I-II  Medial/lateral calcaneal inversion/eversion mobs, grade II-III  Left calf stretch  Medial and superior patellar glides, grade I-II, left  Ankle PROM     therapeutic exercises to develop strength, endurance, ROM, flexibility, posture, and core stabilization for 24 minutes including testing:    Supine AROM heel slide: 20x5" holds    Sidelying hip abduction: 23x left (no boot; gluteus medius concentration)  Clamshells: 20x5" holds yellow theraband left   Reverse clamshells: 20x5" holds left       Not today  Straight leg raise: 3x10 left 1 pounds   Ankle circles: 30x Bilateral   Calf stretch: 3x30'' left with towel - perform standing in shoe next  Supine dorsiflexion/eversion/inversion: RTB 3x10 each with 3" hold         neuromuscular re-education activities to improve: Balance, Coordination, Proprioception, and Posture for 16 minutes. The following activities were included:  TKE-  green theraband 20x5" holds left   Leg Press with green theraband around knees: 30x seat 3 5 plates (inc weight next)  Left Leg Press: seat 3, 4 plates 10x      Gait training for 16 minutes: with and ASO    - Gait 300 ft Bilateral axillary crutches with left ASO decreased L stance time, left knee valgus collapse, trendelenberg.   - Gait 36 ft x 2 1 axillary crutch with left ASO. 2 point modified pattern, poor control with TKE       Previous:   100 ft x 1 with Bilateral crutches on unlevel surfaces including ramp  -Lateral walking in // bars: 5 laps with NO  upper extremity use  -Forward walking in // bars: 5 laps with No  UE support.  -Backwards Waling in // bar 5 laps with No UE support       Therapeutic activities for 10 minutes including: ASO    Not today:  -Standing weight shifts without upper extremity: 20x3" holds lateral and diagonal (left in front)  -Step to and from with stance on LLE: 20x (1 step with each lower extremity forward and " back in // bars)  -Toe taps on 6 inch step: 20x with stance on LLE  (VC to avoid weight shift onto RLE on step)   -mini squats 3x10         Patient Education and Home Exercises     Home Exercises Provided and Patient Education Provided   Education provided:   - proper foot wear  - course of therapy, prognosis  - importance of home exercise program - updated 3/21/23    Written Home Exercises Provided: yes. Exercises were reviewed and Magda was able to demonstrate them prior to the end of the session.  Magda demonstrated good  understanding of the education provided. See EMR under Patient Instructions for exercises provided during therapy sessions    ASSESSMENT     Magda is a 27 y.o. female referred to outpatient Physical Therapy with a medical diagnosis of Left Bimalleolar ankle fracture and Closed dislocation of left patella presenting to PT at [facility]. She received a left ankle ORIF on 1/6/23 and was non-operative knee immobilizer for left lateral patellar dislocation (reduced at external rotation visit on say of injury 12/29/22.    She arrived today with ASO and Bilateral axillary crutches as rec'd by physician. Left knee valgus, trendelenberg and dec'd stance time on left. Left knee flexion continues to improve per measurements. Updated home exercise program to increase lateral hip strengthening. Significant quad weakness seen with microFET.     Magda Is progressing well towards her goals.   Pt prognosis is Excellent.     Pt will continue to benefit from skilled outpatient physical therapy to address the deficits listed in the problem list box on initial evaluation, provide pt/family education and to maximize pt's level of independence in the home and community environment.     Pt's spiritual, cultural and educational needs considered and pt agreeable to plan of care and goals.     Anticipated barriers to physical therapy: comorbidities, fear and avoidance of left ankle/knee  ROM    Goals:   GOALS: Short Term Goals:  4 weeks  1. Report decreased left ankle and knee pain </= 3/10 with standing and walking with AD to increase tolerance for ADLs. MET  2. Increase left ankle AROM by 5 degrees in order to walk with min to no compensation. PROGRESSING; NOT MET  3. Pt will demo good sitting and standing posture for improved spine and joint alignment for improved biomechanics. PROGRESSING; NOT MET  4. Pt to tolerate HEP to improve ROM and independence with ADL's. MET  5. Pt will perform standing squat without AD with equal weight bearing on each lower extremity for standing ADLs.  PROGRESSING; NOT MET     Long Term Goals: 8 weeks  1. Report decreased left ankle and knee pain </= 1/10 with walking and stair negotiation without AD to increase tolerance for increased QoL and improved ADLs. PROGRESSING; NOT MET  2. Patient goal: to get back to walking normally. PROGRESSING; NOT MET  3. Increase strength to >/= 4+/5 for BLE to increase tolerance for ADL and work activities. PROGRESSING; NOT MET  4. Pt will report at </= 35% impaired on ANKLE FOTO score to demo increased functional mobility. PROGRESSING; NOT MET  5. Pt will be able to ambulate community distances and negotiate stairs with minimal ankle pain for increased functional mobility and QoL. PROGRESSING; NOT MET    PLAN     Cont per POC, progress WBAT on LLE with AD with boot donned.    Berhane Masters, PT

## 2023-03-24 ENCOUNTER — CLINICAL SUPPORT (OUTPATIENT)
Dept: REHABILITATION | Facility: HOSPITAL | Age: 28
End: 2023-03-24
Payer: COMMERCIAL

## 2023-03-24 DIAGNOSIS — M25.672 DECREASED RANGE OF MOTION OF LEFT ANKLE: Primary | ICD-10-CM

## 2023-03-24 DIAGNOSIS — R53.1 DECREASED STRENGTH: ICD-10-CM

## 2023-03-24 PROCEDURE — 97116 GAIT TRAINING THERAPY: CPT | Mod: PN | Performed by: PHYSICAL THERAPIST

## 2023-03-24 PROCEDURE — 97110 THERAPEUTIC EXERCISES: CPT | Mod: PN | Performed by: PHYSICAL THERAPIST

## 2023-03-24 PROCEDURE — 97112 NEUROMUSCULAR REEDUCATION: CPT | Mod: PN | Performed by: PHYSICAL THERAPIST

## 2023-03-24 NOTE — PROGRESS NOTES
"[facility]   Physical Therapy Treatment Note     Name: Magda Lowery  Clinic Number: [id number]    Therapy Diagnosis:   Encounter Diagnoses   Name Primary?    Decreased range of motion of left ankle Yes    Decreased strength        Physician: Liudmila Monge PA-C    Visit Date: 3/24/2023    Physician Orders: PT Eval and Treat  Medical Diagnosis from Referral:   S82.842A (ICD-10-CM) - Bimalleolar ankle fracture, left, closed, initial encounter   S83.005D (ICD-10-CM) - Closed dislocation of left patella, subsequent encounter      Evaluation Date: 2/17/2023  Authorization Period Expiration: 02/06/2024  Plan of Care Expiration: 4/14/23  Progress Note Due: 4/14/23  Visit # / Visits authorized: 10/20 (+1)  FOTO: 2/10 - Performed 3/21/23   PTA visit 0/6    Precautions: Standard, asthma, hypothyroidism, left knee range of motion as tolerated, left ankle WBAT with boot    Time In: 10:00 AM  Time Out: 11:00 AM  Total Billable Time: 57 minutes (2 TE, 2 NMR, 1 GT)    SUBJECTIVE     Pt reports: she's been working on walking and feels more comfortable in the ASO. She had some stiffness this am, but did some ankle exercises and it loosened up              She was compliant with home exercise program.  Response to previous treatment: increased LLE WB   Functional change: walking more    Pain: 0/10  Location: left ankle  Pain: 0/10, when stretching  Location: left knee      OBJECTIVE     Objective Measures updated at progress report unless specified.     3/21/23:  Left knee flexion AROM: 123 degree   Unable to do left cybex leg extension due to weakness    Treatment     Magda received the treatments listed below:         therapeutic exercises to develop strength, endurance, ROM, flexibility, posture, and core stabilization for 24 minutes including testing:    Straight leg raise: 3x10 left  Sidelying hip abduction: 30x left (no boot; gluteus medius concentration)  Clamshells: 20x5" holds yellow theraband " "left   Reverse clamshells: 20x5" holds left       Not today    Ankle circles: 30x Bilateral   Calf stretch: 3x30'' left with towel - perform standing in shoe next  Supine dorsiflexion/eversion/inversion: RTB 3x10 each with 3" hold       neuromuscular re-education activities to improve: Balance, Coordination, Proprioception, and Posture for 25 minutes. The following activities were included:  TKE-  Black theraband 20x5" holds left   Leg Press with knees: 30x seat 3 5 plates (inc next visit)  Left Leg Press: seat 3, 4 plates 12x    Toe taps on step: 4" step 20x each  Single leg stance: 10x2" (unable to fully lift right lower extremity)  Step to and back with right lower extremity without upper extremity assist: 20x (stand on left)      Gait training for 8 minutes: with and ASO    - Gait 300 ft 1 axillary crutches with left ASO; improved weight bearing tolerance on left LE       Previous:   100 ft x 1 with Bilateral crutches on unlevel surfaces including ramp  -Lateral walking in // bars: 5 laps with NO  upper extremity use  -Forward walking in // bars: 5 laps with No  UE support.  -Backwards Waling in // bar 5 laps with No UE support       Therapeutic activities for 00 minutes including: ASO    Not today:  -Standing weight shifts without upper extremity: 20x3" holds lateral and diagonal (left in front)  -Step to and from with stance on LLE: 20x (1 step with each lower extremity forward and back in // bars)  -Toe taps on 6 inch step: 20x with stance on LLE  (VC to avoid weight shift onto RLE on step)   -mini squats 3x10         Patient Education and Home Exercises     Home Exercises Provided and Patient Education Provided   Education provided:   - proper foot wear  - course of therapy, prognosis  - importance of home exercise program - updated 3/21/23    Written Home Exercises Provided: yes. Exercises were reviewed and Magda was able to demonstrate them prior to the end of the session.  Magda demonstrated good  " understanding of the education provided. See EMR under Patient Instructions for exercises provided during therapy sessions    ASSESSMENT     Magda is a 27 y.o. female referred to outpatient Physical Therapy with a medical diagnosis of Left Bimalleolar ankle fracture and Closed dislocation of left patella presenting to PT at [facility]. She received a left ankle ORIF on 1/6/23 and was non-operative knee immobilizer for left lateral patellar dislocation (reduced at external rotation visit on say of injury 12/29/22.    She arrived today with ASO and Bilateral axillary crutches as rec'd by physician. Improving gait with 1 crutch today, increasing to 300 ft with improved stance time on left. Unable to perform true single leg stance due to balance and weakness. Plan to progress to gait training without a device next visit.     Magda Is progressing well towards her goals.   Pt prognosis is Excellent.     Pt will continue to benefit from skilled outpatient physical therapy to address the deficits listed in the problem list box on initial evaluation, provide pt/family education and to maximize pt's level of independence in the home and community environment.     Pt's spiritual, cultural and educational needs considered and pt agreeable to plan of care and goals.     Anticipated barriers to physical therapy: comorbidities, fear and avoidance of left ankle/knee ROM    Goals:   GOALS: Short Term Goals:  4 weeks  1. Report decreased left ankle and knee pain </= 3/10 with standing and walking with AD to increase tolerance for ADLs. MET  2. Increase left ankle AROM by 5 degrees in order to walk with min to no compensation. PROGRESSING; NOT MET  3. Pt will demo good sitting and standing posture for improved spine and joint alignment for improved biomechanics. PROGRESSING; NOT MET  4. Pt to tolerate HEP to improve ROM and independence with ADL's. MET  5. Pt will perform standing squat without AD with  equal weight bearing on each lower extremity for standing ADLs.  PROGRESSING; NOT MET     Long Term Goals: 8 weeks  1. Report decreased left ankle and knee pain </= 1/10 with walking and stair negotiation without AD to increase tolerance for increased QoL and improved ADLs. PROGRESSING; NOT MET  2. Patient goal: to get back to walking normally. PROGRESSING; NOT MET  3. Increase strength to >/= 4+/5 for BLE to increase tolerance for ADL and work activities. PROGRESSING; NOT MET  4. Pt will report at </= 35% impaired on ANKLE FOTO score to demo increased functional mobility. PROGRESSING; NOT MET  5. Pt will be able to ambulate community distances and negotiate stairs with minimal ankle pain for increased functional mobility and QoL. PROGRESSING; NOT MET    PLAN     Cont per POC, progress WBAT on LLE with AD with ASO    Berhane Masters, PT

## 2023-03-27 ENCOUNTER — CLINICAL SUPPORT (OUTPATIENT)
Dept: REHABILITATION | Facility: HOSPITAL | Age: 28
End: 2023-03-27
Payer: OTHER MISCELLANEOUS

## 2023-03-27 DIAGNOSIS — M25.672 DECREASED RANGE OF MOTION OF LEFT ANKLE: Primary | ICD-10-CM

## 2023-03-27 DIAGNOSIS — R53.1 DECREASED STRENGTH: ICD-10-CM

## 2023-03-27 PROCEDURE — 97110 THERAPEUTIC EXERCISES: CPT | Mod: PN

## 2023-03-27 PROCEDURE — 97116 GAIT TRAINING THERAPY: CPT | Mod: PN

## 2023-03-27 PROCEDURE — 97112 NEUROMUSCULAR REEDUCATION: CPT | Mod: PN

## 2023-03-27 NOTE — PROGRESS NOTES
[facility]   Physical Therapy Treatment Note     Name: Magda Lowery  Clinic Number: [id number]    Therapy Diagnosis:   Encounter Diagnoses   Name Primary?    Decreased range of motion of left ankle Yes    Decreased strength      Physician: Liudmila Monge PA-C  Visit Date: 3/27/2023  Physician Orders: PT Eval and Treat  Medical Diagnosis from Referral:   S82.842A (ICD-10-CM) - Bimalleolar ankle fracture, left, closed, initial encounter   S83.005D (ICD-10-CM) - Closed dislocation of left patella, subsequent encounter      Evaluation Date: 2/17/2023  Authorization Period Expiration: 02/06/2024  Plan of Care Expiration: 4/14/23  Progress Note Due: 4/14/23  Visit # / Visits authorized: 10/20 (+1)  FOTO: 2/10 - Performed 3/21/23   PTA visit 0/6    Precautions: Standard, asthma, hypothyroidism, left knee range of motion as tolerated, left ankle WBAT with boot    Time In: 7:05 AM  Time Out: 8:00 AM  Total Billable Time: 55 minutes (1 TE, 2 NMR, 1 GT)    SUBJECTIVE     Pt reports: using two crutches to walk, but trying to not use anything but her ankle brace              She was compliant with home exercise program.  Response to previous treatment: increased LLE WB   Functional change: walking more    Pain: 0/10  Location: left ankle  Pain: 0/10, when stretching  Location: left knee      OBJECTIVE     Objective Measures updated at progress report unless specified.     3/21/23:  Left knee flexion AROM: 123 degree   Unable to do left cybex leg extension due to weakness    Treatment     Magda received the treatments listed below:         therapeutic exercises to develop strength, endurance, ROM, flexibility, posture, and core stabilization for 8 minutes including testing:    NuStep: 6', L3  Straight leg raise: 3x10 left, 3#    neuromuscular re-education activities to improve: Balance, Coordination, Proprioception, and Posture for 35 minutes. The following activities were included:  TKE-  Black  "theraband 20x5" holds left   Leg Press with knees: 30x seat 3 5 plates (inc next visit)  Left Leg Press: seat 3, 4 plates 12x    Toe taps on step: 4" step 30x each  Lateral step ups: 2x10, left, 4 inch step  Single leg stance: 10x2" (unable to fully lift right lower extremity)  Step to and back with right lower extremity without upper extremity assist: 20x (stand on left)    Gait training for 10 minutes: with and ASO    Gait 300 ft 1 axillary crutches with left ASO; improved weight bearing tolerance on left LE    Previous:   -Lateral walking in // bars: 5 laps with NO  upper extremity use  -Forward walking in // bars: 5 laps with No  UE support.  -Backwards Waling in // bar 5 laps with No UE support     Patient Education and Home Exercises     Home Exercises Provided and Patient Education Provided   Education provided:   - proper foot wear  - course of therapy, prognosis  - importance of home exercise program - updated 3/21/23    Written Home Exercises Provided: yes. Exercises were reviewed and Magda was able to demonstrate them prior to the end of the session.  Magda demonstrated good  understanding of the education provided. See EMR under Patient Instructions for exercises provided during therapy sessions    ASSESSMENT     Magda is a 27 y.o. female referred to outpatient Physical Therapy with a medical diagnosis of Left Bimalleolar ankle fracture and Closed dislocation of left patella presenting to PT at [facility]. She received a left ankle ORIF on 1/6/23 and was non-operative knee immobilizer for left lateral patellar dislocation (reduced at external rotation visit on say of injury 12/29/22.    She arrived today with ASO and Bilateral axillary crutches as rec'd by physician. She has been cleared to just walk with brace only and wean out of boot and crutches when ready. Progressed standing activities and she did very well overall. Largest barrier is still fear and avoidance, and " education to walk more without crutches at home. Good quad strength seen (no lag with 3# weight for straight leg raise), no instability in standing present, and pt is just very cautious with all weight bearing movements. 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    Magda Is progressing well towards her goals.   Pt prognosis is Excellent.     Pt will continue to benefit from skilled outpatient physical therapy to address the deficits listed in the problem list box on initial evaluation, provide pt/family education and to maximize pt's level of independence in the home and community environment.     Pt's spiritual, cultural and educational needs considered and pt agreeable to plan of care and goals.     Anticipated barriers to physical therapy: comorbidities, fear and avoidance of left ankle/knee ROM    Goals:   GOALS: Short Term Goals:  4 weeks  1. Report decreased left ankle and knee pain </= 3/10 with standing and walking with AD to increase tolerance for ADLs. MET  2. Increase left ankle AROM by 5 degrees in order to walk with min to no compensation. PROGRESSING; NOT MET  3. Pt will demo good sitting and standing posture for improved spine and joint alignment for improved biomechanics. PROGRESSING; NOT MET  4. Pt to tolerate HEP to improve ROM and independence with ADL's. MET  5. Pt will perform standing squat without AD with equal weight bearing on each lower extremity for standing ADLs.  PROGRESSING; NOT MET     Long Term Goals: 8 weeks  1. Report decreased left ankle and knee pain </= 1/10 with walking and stair negotiation without AD to increase tolerance for increased QoL and improved ADLs. PROGRESSING; NOT MET  2. Patient goal: to get back to walking normally. PROGRESSING; NOT MET  3. Increase strength to >/= 4+/5 for BLE to increase tolerance for ADL and work activities. PROGRESSING; NOT MET  4. Pt will report at </=  35% impaired on ANKLE FOTO score to demo increased functional mobility. PROGRESSING; NOT MET  5. Pt will be able to ambulate community distances and negotiate stairs with minimal ankle pain for increased functional mobility and QoL. PROGRESSING; NOT MET    PLAN     Cont per POC, progress WBAT on LLE with AD with ASO    Yimi Medina, PT

## 2023-03-30 ENCOUNTER — CLINICAL SUPPORT (OUTPATIENT)
Dept: REHABILITATION | Facility: HOSPITAL | Age: 28
End: 2023-03-30
Payer: OTHER MISCELLANEOUS

## 2023-03-30 DIAGNOSIS — R53.1 DECREASED STRENGTH: ICD-10-CM

## 2023-03-30 DIAGNOSIS — M25.672 DECREASED RANGE OF MOTION OF LEFT ANKLE: Primary | ICD-10-CM

## 2023-03-30 PROCEDURE — 97112 NEUROMUSCULAR REEDUCATION: CPT | Mod: PN | Performed by: PHYSICAL THERAPIST

## 2023-03-30 PROCEDURE — 97116 GAIT TRAINING THERAPY: CPT | Mod: PN | Performed by: PHYSICAL THERAPIST

## 2023-03-30 PROCEDURE — 97110 THERAPEUTIC EXERCISES: CPT | Mod: PN | Performed by: PHYSICAL THERAPIST

## 2023-03-30 NOTE — PROGRESS NOTES
[facility]   Physical Therapy Treatment Note     Name: Magda Lowery  Clinic Number: [id number]    Therapy Diagnosis:   Encounter Diagnoses   Name Primary?    Decreased range of motion of left ankle Yes    Decreased strength      Physician: Liudmila Monge PA-C  Visit Date: 3/30/2023  Physician Orders: PT Eval and Treat  Medical Diagnosis from Referral:   S82.842A (ICD-10-CM) - Bimalleolar ankle fracture, left, closed, initial encounter   S83.005D (ICD-10-CM) - Closed dislocation of left patella, subsequent encounter      Evaluation Date: 2/17/2023  Authorization Period Expiration: 02/06/2024  Plan of Care Expiration: 4/14/23  Progress Note Due: 4/14/23  Visit # / Visits authorized: 12/20 (+1)  FOTO: 3/10 - Performed 3/21/23   PTA visit 0/6    Precautions: Standard, asthma, hypothyroidism, left knee range of motion as tolerated, left ankle WBAT with boot    Time In: 8:00 AM  Time Out: 9:00 AM  Total Billable Time: 56 minutes (1 TE, 2 NMR, 1 GT)    SUBJECTIVE     Pt reports: she takes some rest breaks. She's using the boot all day. Next wk, she's going to start 1 hr in AFO              She was compliant with home exercise program.  Response to previous treatment: increased LLE WB   Functional change: walking more    Pain: 0/10  Location: left ankle  Pain: 0/10, when stretching  Location: left knee      OBJECTIVE     Objective Measures updated at progress report unless specified.     3/21/23:  Left knee flexion AROM: 123 degree   Unable to do left cybex leg extension due to weakness    Treatment     Magda received the treatments listed below:         therapeutic exercises to develop strength, endurance, ROM, flexibility, posture, and core stabilization for 10 minutes including testing:    NuStep: 6', L3  Lateral step ups: 2x10, left, 4 inch step      neuromuscular re-education activities to improve: Balance, Coordination, Proprioception, and Posture for 30 minutes. The following activities  "were included:  TKE-  Black theraband 20x5" holds left   Leg Press DL: 2x10 seat 3 9 plates   Left Leg Press: seat 3, 4 plates 15x    Toe taps on step: 4" step 30x each  Single leg stance: 10x2" (unable to fully lift right lower extremity)  Step to and back with right lower extremity without upper extremity assist: 20x (stand on left)    Gait training for 16 minutes: with and AFO    Gait 300 ft NO DEVICE with left AFO; improved weight bearing tolerance on left lower extremity  Ladder walks:  - Step to leading with right: 2 laps  - reciprocal: 2 laps  - Lateral walks: 2 laps   - Icky shuffle: 2 laps    Previous:   -Lateral walking in // bars: 5 laps with NO  upper extremity use  -Forward walking in // bars: 5 laps with No  UE support.  -Backwards Waling in // bar 5 laps with No UE support     Patient Education and Home Exercises     Home Exercises Provided and Patient Education Provided   Education provided:   - proper foot wear  - course of therapy, prognosis  - importance of home exercise program - updated 3/21/23    Written Home Exercises Provided: yes. Exercises were reviewed and Magda was able to demonstrate them prior to the end of the session.  Magda demonstrated good  understanding of the education provided. See EMR under Patient Instructions for exercises provided during therapy sessions    ASSESSMENT     Magda is a 27 y.o. female referred to outpatient Physical Therapy with a medical diagnosis of Left Bimalleolar ankle fracture and Closed dislocation of left patella presenting to PT at [facility]. She received a left ankle ORIF on 1/6/23 and was non-operative knee immobilizer for left lateral patellar dislocation (reduced at external rotation visit on say of injury 12/29/22.    Concentrated on gait training and improving left stance without crutch and with AFO. Improvements seen in particular with ladder walks. No crutch use for all clinic ambulation today.     Magda Is " progressing well towards her goals.   Pt prognosis is Excellent.     Pt will continue to benefit from skilled outpatient physical therapy to address the deficits listed in the problem list box on initial evaluation, provide pt/family education and to maximize pt's level of independence in the home and community environment.     Pt's spiritual, cultural and educational needs considered and pt agreeable to plan of care and goals.     Anticipated barriers to physical therapy: comorbidities, fear and avoidance of left ankle/knee ROM    Goals:   GOALS: Short Term Goals:  4 weeks  1. Report decreased left ankle and knee pain </= 3/10 with standing and walking with AD to increase tolerance for ADLs. MET  2. Increase left ankle AROM by 5 degrees in order to walk with min to no compensation. PROGRESSING; NOT MET  3. Pt will demo good sitting and standing posture for improved spine and joint alignment for improved biomechanics. PROGRESSING; NOT MET  4. Pt to tolerate HEP to improve ROM and independence with ADL's. MET  5. Pt will perform standing squat without AD with equal weight bearing on each lower extremity for standing ADLs.  PROGRESSING; NOT MET     Long Term Goals: 8 weeks  1. Report decreased left ankle and knee pain </= 1/10 with walking and stair negotiation without AD to increase tolerance for increased QoL and improved ADLs. PROGRESSING; NOT MET  2. Patient goal: to get back to walking normally. PROGRESSING; NOT MET  3. Increase strength to >/= 4+/5 for BLE to increase tolerance for ADL and work activities. PROGRESSING; NOT MET  4. Pt will report at </= 35% impaired on ANKLE FOTO score to demo increased functional mobility. PROGRESSING; NOT MET  5. Pt will be able to ambulate community distances and negotiate stairs with minimal ankle pain for increased functional mobility and QoL. PROGRESSING; NOT MET    PLAN     Cont per POC, progress WBAT on LLE with AD with COLEMAN Masters, PT

## 2023-04-04 ENCOUNTER — CLINICAL SUPPORT (OUTPATIENT)
Dept: REHABILITATION | Facility: HOSPITAL | Age: 28
End: 2023-04-04
Payer: OTHER MISCELLANEOUS

## 2023-04-04 DIAGNOSIS — R53.1 DECREASED STRENGTH: ICD-10-CM

## 2023-04-04 DIAGNOSIS — M25.672 DECREASED RANGE OF MOTION OF LEFT ANKLE: Primary | ICD-10-CM

## 2023-04-04 PROCEDURE — 97112 NEUROMUSCULAR REEDUCATION: CPT | Mod: PN | Performed by: PHYSICAL THERAPIST

## 2023-04-04 PROCEDURE — 97116 GAIT TRAINING THERAPY: CPT | Mod: PN | Performed by: PHYSICAL THERAPIST

## 2023-04-04 PROCEDURE — 97530 THERAPEUTIC ACTIVITIES: CPT | Mod: PN | Performed by: PHYSICAL THERAPIST

## 2023-04-04 NOTE — PROGRESS NOTES
"[facility]   Physical Therapy Treatment Note     Name: Magda Lowery  Clinic Number: [id number]    Therapy Diagnosis:   Encounter Diagnoses   Name Primary?    Decreased range of motion of left ankle Yes    Decreased strength      Physician: Liudmila Monge PA-C  Visit Date: 4/4/2023  Physician Orders: PT Eval and Treat  Medical Diagnosis from Referral:   S82.842A (ICD-10-CM) - Bimalleolar ankle fracture, left, closed, initial encounter   S83.005D (ICD-10-CM) - Closed dislocation of left patella, subsequent encounter      Evaluation Date: 2/17/2023  Authorization Period Expiration: 02/06/2024  Plan of Care Expiration: 4/14/23  Progress Note Due: 4/14/23  Visit # / Visits authorized: 13/20 (+1)  FOTO: 4/10 - Performed 3/21/23   PTA visit 0/6    Precautions: Standard, asthma, hypothyroidism, left knee range of motion as tolerated, left ankle WBAT with boot    Time In: 9:00 AM  Time Out: 9:53 AM  Total Billable Time: 50 minutes (1 TA, 2 NMR, 1 GT)    SUBJECTIVE     Pt reports: she tried 1 hr in AM and 1 hr after lunch at work and did well. She did have to ice after the afternoon hour (around 3 pm) for a little, but "it wasn't too bad"              She was compliant with home exercise program.  Response to previous treatment: increased LLE WB   Functional change: walking more    Pain: 0/10  Location: left ankle  Pain: 0/10, when stretching  Location: left knee      OBJECTIVE     Objective Measures updated at progress report unless specified.     3/21/23:  Left knee flexion AROM: 123 degree   Unable to do left cybex leg extension due to weakness    Treatment     Magda received the treatments listed below:         therapeutic exercises to develop strength, endurance, ROM, flexibility, posture, and core stabilization for 00 minutes including testing:    NuStep: 6', L3    therapeutic activities to improve functional performance for 8  minutes, including:    Step ups: 4" step 2x10 (up with left, " "down with left)  Lateral step ups: 2x10, left, 4 inch step    neuromuscular re-education activities to improve: Balance, Coordination, Proprioception, and Posture for 26 minutes. The following activities were included:  TKE @ multi hip: 4 plates 20x5" holds left   Leg Press DL: 3x10 seat 3 9 plates   Left Leg Press: seat 3, 4 plates 2x10    Toe taps on step: 6" step 30x each  Foam stance: 1 minutes (close spv)  Foam stance eyes closed: 1 minutes (close spv)    -Backwards Walking in // bar 5 laps with No UE support   -Lateral walking in // bars: 5 laps with NO  upper extremity use    Not today:  Single leg stance: 10x2" (unable to fully lift right lower extremity)    Gait training for 16 minutes: with and AFO    Ladder walks:  - Step to leading with right: 2 laps  - reciprocal: 3 laps  - Lateral walks: 2 laps   - Icky shuffle: 2 laps  - facing ladder in/in out/out: 2 laps each        Patient Education and Home Exercises     Home Exercises Provided and Patient Education Provided   Education provided:   - proper foot wear  - course of therapy, prognosis  - importance of home exercise program - updated 3/21/23    Written Home Exercises Provided: yes. Exercises were reviewed and Magda was able to demonstrate them prior to the end of the session.  Magda demonstrated good  understanding of the education provided. See EMR under Patient Instructions for exercises provided during therapy sessions    ASSESSMENT     Magda is a 27 y.o. female referred to outpatient Physical Therapy with a medical diagnosis of Left Bimalleolar ankle fracture and Closed dislocation of left patella presenting to PT at [facility]. She received a left ankle ORIF on 1/6/23 and was non-operative knee immobilizer for left lateral patellar dislocation (reduced at external rotation visit on say of injury 12/29/22.    Continuing to improve stability in single leg stance as demonstrated with arrival without a crutch today and " during ladder drills. Most difficulty with the in/in out/out ladder exercises. More ease with step toe taps as well. Gradually progressing work time out of boot to 2 hrs in AM and 1 hr in PM with goal of progressing to 2 hrs in AM and 2 hrs in PM on Thursday.     Magda Is progressing well towards her goals.   Pt prognosis is Excellent.     Pt will continue to benefit from skilled outpatient physical therapy to address the deficits listed in the problem list box on initial evaluation, provide pt/family education and to maximize pt's level of independence in the home and community environment.     Pt's spiritual, cultural and educational needs considered and pt agreeable to plan of care and goals.     Anticipated barriers to physical therapy: comorbidities, fear and avoidance of left ankle/knee ROM    Goals:   GOALS: Short Term Goals:  4 weeks  1. Report decreased left ankle and knee pain </= 3/10 with standing and walking with AD to increase tolerance for ADLs. MET  2. Increase left ankle AROM by 5 degrees in order to walk with min to no compensation. PROGRESSING; NOT MET  3. Pt will demo good sitting and standing posture for improved spine and joint alignment for improved biomechanics. MET  4. Pt to tolerate HEP to improve ROM and independence with ADL's. MET  5. Pt will perform standing squat without AD with equal weight bearing on each lower extremity for standing ADLs.  PROGRESSING; NOT MET     Long Term Goals: 8 weeks  1. Report decreased left ankle and knee pain </= 1/10 with walking and stair negotiation without AD to increase tolerance for increased QoL and improved ADLs. PROGRESSING; NOT MET  2. Patient goal: to get back to walking normally. PROGRESSING; NOT MET  3. Increase strength to >/= 4+/5 for BLE to increase tolerance for ADL and work activities. PROGRESSING; NOT MET  4. Pt will report at </= 35% impaired on ANKLE FOTO score to demo increased functional mobility. PROGRESSING; NOT MET  5. Pt will be  able to ambulate community distances and negotiate stairs with minimal ankle pain for increased functional mobility and QoL. PROGRESSING; NOT MET    PLAN     Cont per POC, progress WBAT on LLE with AD with ASO    Berhane Masters, PT

## 2023-04-05 ENCOUNTER — PATIENT MESSAGE (OUTPATIENT)
Dept: ADMINISTRATIVE | Facility: OTHER | Age: 28
End: 2023-04-05
Payer: COMMERCIAL

## 2023-04-06 ENCOUNTER — CLINICAL SUPPORT (OUTPATIENT)
Dept: REHABILITATION | Facility: HOSPITAL | Age: 28
End: 2023-04-06
Payer: COMMERCIAL

## 2023-04-06 DIAGNOSIS — M25.672 DECREASED RANGE OF MOTION OF LEFT ANKLE: Primary | ICD-10-CM

## 2023-04-06 DIAGNOSIS — R53.1 DECREASED STRENGTH: ICD-10-CM

## 2023-04-06 PROCEDURE — 97530 THERAPEUTIC ACTIVITIES: CPT | Mod: PN,CQ

## 2023-04-06 PROCEDURE — 97112 NEUROMUSCULAR REEDUCATION: CPT | Mod: PN,CQ

## 2023-04-06 NOTE — PROGRESS NOTES
"[facility]   Physical Therapy Treatment Note     Name: Magda Lowery  Clinic Number: [id number]    Therapy Diagnosis:   Encounter Diagnoses   Name Primary?    Decreased range of motion of left ankle Yes    Decreased strength      Physician: Liudmila Monge PA-C  Visit Date: 4/6/2023  Physician Orders: PT Eval and Treat  Medical Diagnosis from Referral:   S82.842A (ICD-10-CM) - Bimalleolar ankle fracture, left, closed, initial encounter   S83.005D (ICD-10-CM) - Closed dislocation of left patella, subsequent encounter      Evaluation Date: 2/17/2023  Authorization Period Expiration: 02/06/2024  Plan of Care Expiration: 4/14/23  Progress Note Due: 4/14/23  Visit # / Visits authorized: 13/20 (+1)  FOTO: 4/10 - Performed 3/21/23   PTA visit 1/6    Precautions: Standard, asthma, hypothyroidism, left knee range of motion as tolerated, left ankle WBAT with boot    Time In: 9:02 AM  Time Out: 9:58 AM  Total Billable Time: 52 minutes (2 TA, 2 NMR,)    SUBJECTIVE     Pt reports: Patient worked 2 hours, lunch, then 1 hour with no issues. Notes mild mm fatigue.               She was compliant with home exercise program.  Response to previous treatment: increased LLE WB   Functional change: walking more    Pain: 0/10  Location: left ankle  Pain: 0/10, when stretching  Location: left knee      OBJECTIVE     Objective Measures updated at progress report unless specified.     3/21/23:  Left knee flexion AROM: 123 degree   Unable to do left cybex leg extension due to weakness    Treatment     Magda received the treatments listed below:         therapeutic exercises to develop strength, endurance, ROM, flexibility, posture, and core stabilization for 00 minutes including testing:    NuStep: 8', L3    therapeutic activities to improve functional performance for 23  minutes, including:    Step ups: 4" step 2x10 BLE.   Lateral step ups: 2x10, left, 4 inch step  Staggered STS (R ahead of L) 2x10   Leg Press DL: " "3x10 seat 3 9 plates   Left Leg Press: seat 3, 4 plates 2x10    neuromuscular re-education activities to improve: Balance, Coordination, Proprioception, and Posture for 30 minutes. The following activities were included:      -TKE @ multi hip: 4 plates 20x5" holds left   -Toe taps on step:Conex x30 fwd and cross midline   -Tandem on foam 1' ea  -Backwards Walking in // bar 5 laps with No UE support   -Lateral walking in // bars: 5 laps with NO  upper extremity use  - Single leg stance 10x10"   - 90 90 hip lift with RLE march x20     Not today:    Gait training for 16 minutes: with and AFO    Ladder walks:  - Step to leading with right: 2 laps  - reciprocal: 3 laps  - Lateral walks: 2 laps   - Icky shuffle: 2 laps  - facing ladder in/in out/out: 2 laps each        Patient Education and Home Exercises     Home Exercises Provided and Patient Education Provided   Education provided:   - proper foot wear  - course of therapy, prognosis  - importance of home exercise program - updated 3/21/23    Written Home Exercises Provided: yes. Exercises were reviewed and Magda was able to demonstrate them prior to the end of the session.  Magda demonstrated good  understanding of the education provided. See EMR under Patient Instructions for exercises provided during therapy sessions    ASSESSMENT     Magda is a 27 y.o. female referred to outpatient Physical Therapy with a medical diagnosis of Left Bimalleolar ankle fracture and Closed dislocation of left patella presenting to PT at [facility]. She received a left ankle ORIF on 1/6/23 and was non-operative knee immobilizer for left lateral patellar dislocation (reduced at external rotation visit on say of injury 12/29/22.    Progressed step taps to cone taps to focus on maintaining single leg stance (cannot weight shift to step),  initiated staggered STS for functional closed chain strength of LLE. Initiated 90 90 hip lift with RLE march for improve L " single leg stance control and improve L HS/ glute strength to improve push off in gait.     Magda Is progressing well towards her goals.   Pt prognosis is Excellent.     Pt will continue to benefit from skilled outpatient physical therapy to address the deficits listed in the problem list box on initial evaluation, provide pt/family education and to maximize pt's level of independence in the home and community environment.     Pt's spiritual, cultural and educational needs considered and pt agreeable to plan of care and goals.     Anticipated barriers to physical therapy: comorbidities, fear and avoidance of left ankle/knee ROM    Goals:   GOALS: Short Term Goals:  4 weeks  1. Report decreased left ankle and knee pain </= 3/10 with standing and walking with AD to increase tolerance for ADLs. MET  2. Increase left ankle AROM by 5 degrees in order to walk with min to no compensation. PROGRESSING; NOT MET  3. Pt will demo good sitting and standing posture for improved spine and joint alignment for improved biomechanics. MET  4. Pt to tolerate HEP to improve ROM and independence with ADL's. MET  5. Pt will perform standing squat without AD with equal weight bearing on each lower extremity for standing ADLs.  PROGRESSING; NOT MET     Long Term Goals: 8 weeks  1. Report decreased left ankle and knee pain </= 1/10 with walking and stair negotiation without AD to increase tolerance for increased QoL and improved ADLs. PROGRESSING; NOT MET  2. Patient goal: to get back to walking normally. PROGRESSING; NOT MET  3. Increase strength to >/= 4+/5 for BLE to increase tolerance for ADL and work activities. PROGRESSING; NOT MET  4. Pt will report at </= 35% impaired on ANKLE FOTO score to demo increased functional mobility. PROGRESSING; NOT MET  5. Pt will be able to ambulate community distances and negotiate stairs with minimal ankle pain for increased functional mobility and QoL. PROGRESSING; NOT MET    PLAN     Cont per POC,  "progress WBAT on LLE with AD with ASO    Rohith Cisneros, PTA                           [facility]   Physical Therapy Treatment Note     Name: Magda Lowery  Clinic Number: [id number]    Therapy Diagnosis:   Encounter Diagnoses   Name Primary?    Decreased range of motion of left ankle Yes    Decreased strength      Physician: Liudmila Monge PA-C  Visit Date: 4/6/2023  Physician Orders: PT Eval and Treat  Medical Diagnosis from Referral:   S82.842A (ICD-10-CM) - Bimalleolar ankle fracture, left, closed, initial encounter   S83.005D (ICD-10-CM) - Closed dislocation of left patella, subsequent encounter      Evaluation Date: 2/17/2023  Authorization Period Expiration: 02/06/2024  Plan of Care Expiration: 4/14/23  Progress Note Due: 4/14/23  Visit # / Visits authorized: 13/20 (+1)  FOTO: 4/10 - Performed 3/21/23   PTA visit 0/6    Precautions: Standard, asthma, hypothyroidism, left knee range of motion as tolerated, left ankle WBAT with boot    Time In: 9:00 AM  Time Out: 9:53 AM  Total Billable Time: 50 minutes (1 TA, 2 NMR, 1 GT)    SUBJECTIVE     Pt reports: she tried 1 hr in AM and 1 hr after lunch at work and did well. She did have to ice after the afternoon hour (around 3 pm) for a little, but "it wasn't too bad"              She was compliant with home exercise program.  Response to previous treatment: increased LLE WB   Functional change: walking more    Pain: 0/10  Location: left ankle  Pain: 0/10, when stretching  Location: left knee      OBJECTIVE     Objective Measures updated at progress report unless specified.     3/21/23:  Left knee flexion AROM: 123 degree   Unable to do left cybex leg extension due to weakness    Treatment     Magda received the treatments listed below:         therapeutic exercises to develop strength, endurance, ROM, flexibility, posture, and core stabilization for 00 minutes including testing:    NuStep: 6', L3    therapeutic activities to improve functional " "performance for 8  minutes, including:    Step ups: 4" step 2x10 (up with left, down with left)  Lateral step ups: 2x10, left, 4 inch step    neuromuscular re-education activities to improve: Balance, Coordination, Proprioception, and Posture for 26 minutes. The following activities were included:  TKE @ multi hip: 4 plates 20x5" holds left   Leg Press DL: 3x10 seat 3 9 plates   Left Leg Press: seat 3, 4 plates 2x10    Toe taps on step: 6" step 30x each  Foam stance: 1 minutes (close spv)  Foam stance eyes closed: 1 minutes (close spv)    -Backwards Walking in // bar 5 laps with No UE support   -Lateral walking in // bars: 5 laps with NO  upper extremity use    Not today:  Single leg stance: 10x2" (unable to fully lift right lower extremity)    Gait training for 16 minutes: with and AFO    Ladder walks:  - Step to leading with right: 2 laps  - reciprocal: 3 laps  - Lateral walks: 2 laps   - Icky shuffle: 2 laps  - facing ladder in/in out/out: 2 laps each        Patient Education and Home Exercises     Home Exercises Provided and Patient Education Provided   Education provided:   - proper foot wear  - course of therapy, prognosis  - importance of home exercise program - updated 3/21/23    Written Home Exercises Provided: yes. Exercises were reviewed and Magda was able to demonstrate them prior to the end of the session.  Magda demonstrated good  understanding of the education provided. See EMR under Patient Instructions for exercises provided during therapy sessions    ASSESSMENT     Magda is a 27 y.o. female referred to outpatient Physical Therapy with a medical diagnosis of Left Bimalleolar ankle fracture and Closed dislocation of left patella presenting to PT at [facility]. She received a left ankle ORIF on 1/6/23 and was non-operative knee immobilizer for left lateral patellar dislocation (reduced at external rotation visit on say of injury 12/29/22.    Continuing to improve stability " in single leg stance as demonstrated with arrival without a crutch today and during ladder drills. Most difficulty with the in/in out/out ladder exercises. More ease with step toe taps as well. Gradually progressing work time out of boot to 2 hrs in AM and 1 hr in PM with goal of progressing to 2 hrs in AM and 2 hrs in PM on Thursday.     Magda Is progressing well towards her goals.   Pt prognosis is Excellent.     Pt will continue to benefit from skilled outpatient physical therapy to address the deficits listed in the problem list box on initial evaluation, provide pt/family education and to maximize pt's level of independence in the home and community environment.     Pt's spiritual, cultural and educational needs considered and pt agreeable to plan of care and goals.     Anticipated barriers to physical therapy: comorbidities, fear and avoidance of left ankle/knee ROM    Goals:   GOALS: Short Term Goals:  4 weeks  1. Report decreased left ankle and knee pain </= 3/10 with standing and walking with AD to increase tolerance for ADLs. MET  2. Increase left ankle AROM by 5 degrees in order to walk with min to no compensation. PROGRESSING; NOT MET  3. Pt will demo good sitting and standing posture for improved spine and joint alignment for improved biomechanics. MET  4. Pt to tolerate HEP to improve ROM and independence with ADL's. MET  5. Pt will perform standing squat without AD with equal weight bearing on each lower extremity for standing ADLs.  PROGRESSING; NOT MET     Long Term Goals: 8 weeks  1. Report decreased left ankle and knee pain </= 1/10 with walking and stair negotiation without AD to increase tolerance for increased QoL and improved ADLs. PROGRESSING; NOT MET  2. Patient goal: to get back to walking normally. PROGRESSING; NOT MET  3. Increase strength to >/= 4+/5 for BLE to increase tolerance for ADL and work activities. PROGRESSING; NOT MET  4. Pt will report at </= 35% impaired on ANKLE FOTO score  to demo increased functional mobility. PROGRESSING; NOT MET  5. Pt will be able to ambulate community distances and negotiate stairs with minimal ankle pain for increased functional mobility and QoL. PROGRESSING; NOT MET    PLAN     Cont per POC, progress WBAT on LLE with AD with ASO    Rohith Cisneros, PTA

## 2023-04-11 ENCOUNTER — CLINICAL SUPPORT (OUTPATIENT)
Dept: REHABILITATION | Facility: HOSPITAL | Age: 28
End: 2023-04-11
Payer: OTHER MISCELLANEOUS

## 2023-04-11 DIAGNOSIS — M25.672 DECREASED RANGE OF MOTION OF LEFT ANKLE: Primary | ICD-10-CM

## 2023-04-11 DIAGNOSIS — R53.1 DECREASED STRENGTH: ICD-10-CM

## 2023-04-11 PROCEDURE — 97530 THERAPEUTIC ACTIVITIES: CPT | Mod: PN,CQ

## 2023-04-11 PROCEDURE — 97112 NEUROMUSCULAR REEDUCATION: CPT | Mod: PN,CQ

## 2023-04-11 NOTE — PROGRESS NOTES
"[facility]   Physical Therapy Treatment Note     Name: Magda Lowery  Clinic Number: [id number]    Therapy Diagnosis:   Encounter Diagnoses   Name Primary?    Decreased range of motion of left ankle Yes    Decreased strength        Physician: Liudmila Monge PA-C  Visit Date: 4/11/2023  Physician Orders: PT Eval and Treat  Medical Diagnosis from Referral:   S82.842A (ICD-10-CM) - Bimalleolar ankle fracture, left, closed, initial encounter   S83.005D (ICD-10-CM) - Closed dislocation of left patella, subsequent encounter      Evaluation Date: 2/17/2023  Authorization Period Expiration: 02/06/2024  Plan of Care Expiration: 4/14/23  Progress Note Due: 4/14/23  Visit # / Visits authorized: 15/20 (+1)  FOTO: 5/10 - Performed 3/21/23   PTA visit 2/6    Precautions: Standard, asthma, hypothyroidism, left knee range of motion as tolerated, left ankle WBAT with boot    Time In: 8:05 AM  Time Out: 9:00 AM  Total Billable Time: 55 minutes (2 TA, 2 NMR,)    SUBJECTIVE     Pt reports: Patient reports able to stand about 10 minutes in single bout before sitting. Notes she almost never uses crutches now but carries one in car and has one in room just in case.               She was compliant with home exercise program.  Response to previous treatment: increased LLE WB   Functional change: walking more    Pain: 0/10  Location: left ankle  Pain: 0/10, when stretching  Location: left knee      OBJECTIVE     Objective Measures updated at progress report unless specified.     3/21/23:  Left knee flexion AROM: 123 degree   Unable to do left cybex leg extension due to weakness    Treatment     Magda received the treatments listed below:         therapeutic exercises to develop strength, endurance, ROM, flexibility, posture, and core stabilization for 00 minutes including testing:    NuStep: 8', L3    therapeutic activities to improve functional performance for 23  minutes, including:    Step ups: 4" step 2x10 " "BLE.   Lateral step ups: 2x10, left, 4 inch step  Staggered STS (R ahead of L) 2x10   Leg Press DL: 3x10 seat 3 9 plates   Left Leg Press: seat 3, 4 plates 2x10    neuromuscular re-education activities to improve: Balance, Coordination, Proprioception, and Posture for 30 minutes. The following activities were included:      -TKE @ multi hip: 4 plates 20x5" holds left   -Toe taps on step:Conex x30 fwd and cross midline   -Tandem on foam 1' ea  -Backwards Walking in // bar 5 laps with No UE support   -Lateral walking in // bars: 5 laps with NO  upper extremity use  - Single leg stance 10x10"   - 90 90 hip lift with RLE march x20     Not today:    Gait training for 16 minutes: with and AFO    Ladder walks:  - Step to leading with right: 2 laps  - reciprocal: 3 laps  - Lateral walks: 2 laps   - Icky shuffle: 2 laps  - facing ladder in/in out/out: 2 laps each        Patient Education and Home Exercises     Home Exercises Provided and Patient Education Provided   Education provided:   - proper foot wear  - course of therapy, prognosis  - importance of home exercise program - updated 3/21/23    Written Home Exercises Provided: yes. Exercises were reviewed and Magda was able to demonstrate them prior to the end of the session.  Magda demonstrated good  understanding of the education provided. See EMR under Patient Instructions for exercises provided during therapy sessions    ASSESSMENT     Magda is a 27 y.o. female referred to outpatient Physical Therapy with a medical diagnosis of Left Bimalleolar ankle fracture and Closed dislocation of left patella presenting to PT at [facility]. She received a left ankle ORIF on 1/6/23 and was non-operative knee immobilizer for left lateral patellar dislocation (reduced at external rotation visit on say of injury 12/29/22.    Progressed step taps to cone taps to focus on maintaining single leg stance (cannot weight shift to step),  initiated staggered STS " for functional closed chain strength of LLE. Initiated 90 90 hip lift with RLE march for improve L single leg stance control and improve L HS/ glute strength to improve push off in gait.     Magda Is progressing well towards her goals.   Pt prognosis is Excellent.     Pt will continue to benefit from skilled outpatient physical therapy to address the deficits listed in the problem list box on initial evaluation, provide pt/family education and to maximize pt's level of independence in the home and community environment.     Pt's spiritual, cultural and educational needs considered and pt agreeable to plan of care and goals.     Anticipated barriers to physical therapy: comorbidities, fear and avoidance of left ankle/knee ROM    Goals:   GOALS: Short Term Goals:  4 weeks  1. Report decreased left ankle and knee pain </= 3/10 with standing and walking with AD to increase tolerance for ADLs. MET  2. Increase left ankle AROM by 5 degrees in order to walk with min to no compensation. PROGRESSING; NOT MET  3. Pt will demo good sitting and standing posture for improved spine and joint alignment for improved biomechanics. MET  4. Pt to tolerate HEP to improve ROM and independence with ADL's. MET  5. Pt will perform standing squat without AD with equal weight bearing on each lower extremity for standing ADLs.  PROGRESSING; NOT MET     Long Term Goals: 8 weeks  1. Report decreased left ankle and knee pain </= 1/10 with walking and stair negotiation without AD to increase tolerance for increased QoL and improved ADLs. PROGRESSING; NOT MET  2. Patient goal: to get back to walking normally. PROGRESSING; NOT MET  3. Increase strength to >/= 4+/5 for BLE to increase tolerance for ADL and work activities. PROGRESSING; NOT MET  4. Pt will report at </= 35% impaired on ANKLE FOTO score to demo increased functional mobility. PROGRESSING; NOT MET  5. Pt will be able to ambulate community distances and negotiate stairs with minimal  ankle pain for increased functional mobility and QoL. PROGRESSING; NOT MET    PLAN     Cont per POC, progress WBAT on LLE with AD with ASO    Rohith Cisneros, AILEEN                           [facility]   Physical Therapy Treatment Note     Name: Magda Lowery  Clinic Number: [id number]    Therapy Diagnosis:   Encounter Diagnoses   Name Primary?    Decreased range of motion of left ankle Yes    Decreased strength        Physician: Liudmila Monge PA-C  Visit Date: 4/11/2023  Physician Orders: PT Eval and Treat  Medical Diagnosis from Referral:   S82.842A (ICD-10-CM) - Bimalleolar ankle fracture, left, closed, initial encounter   S83.005D (ICD-10-CM) - Closed dislocation of left patella, subsequent encounter      Evaluation Date: 2/17/2023  Authorization Period Expiration: 02/06/2024  Plan of Care Expiration: 4/14/23  Progress Note Due: 4/14/23  Visit # / Visits authorized: 14/20 (+1)  FOTO: 5/10 - Performed 3/21/23   PTA visit 1/6    Precautions: Standard, asthma, hypothyroidism, left knee range of motion as tolerated, left ankle WBAT with boot    Time In: 8:04 AM  Time Out: 9:00 AM  Total Billable Time: 53 minutes (1 TA, 2 NMR, 1 GT)    SUBJECTIVE     Pt reports: Patient reports she is standing longer with less discomfort. Patient reports she notices she can stand a little longer noting she can stand 10 minutes in a single bout.               She was compliant with home exercise program.  Response to previous treatment: increased LLE WB   Functional change: walking more    Pain: 0/10  Location: left ankle  Pain: 0/10, when stretching  Location: left knee      OBJECTIVE     Objective Measures updated at progress report unless specified.     3/21/23:  Left knee flexion AROM: 123 degree   Unable to do left cybex leg extension due to weakness    Treatment     Magda received the treatments listed below:         ttherapeutic exercises to develop strength, endurance, ROM, flexibility, posture, and core  "stabilization for 00 minutes including testing:     NuStep: 8', L3     therapeutic activities to improve functional performance for 25  minutes, including:     Step ups: 4" step 2x10 BLE.   Lateral step ups: 2x10, left, 4 inch step  Staggered STS (R ahead of L) 2x10 sitting on airex pad  Leg Press DL: 3x10 seat 3 9 plates   Left Leg Press: seat 3, 4 plates 2x10     neuromuscular re-education activities to improve: Balance, Coordination, Proprioception, and Posture for 28 minutes. The following activities were included:        -TKE @ multi hip: 4 plates 20x5" holds left   -Toe taps on airex :Conex x30 fwd and cross midline   -Tandem on foam 1' ea  -Tandem laps on airex beam x4 laps.   -Backwards Walking in // bar 5 laps with No UE support   -Lateral walking in // bars: 5 laps with NO  upper extremity use  - Single leg stance 10x10"   - 90 90 hip lift with RLE march x20      Not today:     Gait training for 16 minutes: with and AFO     Ladder walks:  - Step to leading with right: 2 laps  - reciprocal: 3 laps  - Lateral walks: 2 laps   - Icky shuffle: 2 laps  - facing ladder in/in out/out: 2 laps each           Patient Education and Home Exercises     Home Exercises Provided and Patient Education Provided   Education provided:   - proper foot wear  - course of therapy, prognosis  - importance of home exercise program - updated 3/21/23    Written Home Exercises Provided: yes. Exercises were reviewed and Magda was able to demonstrate them prior to the end of the session.  Magda demonstrated good  understanding of the education provided. See EMR under Patient Instructions for exercises provided during therapy sessions    ASSESSMENT     Magad is a 27 y.o. female referred to outpatient Physical Therapy with a medical diagnosis of Left Bimalleolar ankle fracture and Closed dislocation of left patella presenting to PT at [facility]. She received a left ankle ORIF on 1/6/23 and was non-operative " knee immobilizer for left lateral patellar dislocation (reduced at external rotation visit on say of injury 12/29/22.    Initiated tandem laps on airex beam and progressed cone taps to  stand on airex beam for dynamic balance challenge on unstable surface. Patient with good performance of 90 90 hip lift with hip shift suggesting performance at home. Patient reports since practicing  90 90 hip lift, her single leg balance has improved with decreased pain in plantar surface of foot.     Magda Is progressing well towards her goals.   Pt prognosis is Excellent.     Pt will continue to benefit from skilled outpatient physical therapy to address the deficits listed in the problem list box on initial evaluation, provide pt/family education and to maximize pt's level of independence in the home and community environment.     Pt's spiritual, cultural and educational needs considered and pt agreeable to plan of care and goals.     Anticipated barriers to physical therapy: comorbidities, fear and avoidance of left ankle/knee ROM    Goals:   GOALS: Short Term Goals:  4 weeks  1. Report decreased left ankle and knee pain </= 3/10 with standing and walking with AD to increase tolerance for ADLs. MET  2. Increase left ankle AROM by 5 degrees in order to walk with min to no compensation. PROGRESSING; NOT MET  3. Pt will demo good sitting and standing posture for improved spine and joint alignment for improved biomechanics. MET  4. Pt to tolerate HEP to improve ROM and independence with ADL's. MET  5. Pt will perform standing squat without AD with equal weight bearing on each lower extremity for standing ADLs.  PROGRESSING; NOT MET     Long Term Goals: 8 weeks  1. Report decreased left ankle and knee pain </= 1/10 with walking and stair negotiation without AD to increase tolerance for increased QoL and improved ADLs. PROGRESSING; NOT MET  2. Patient goal: to get back to walking normally. PROGRESSING; NOT MET  3. Increase strength to  >/= 4+/5 for BLE to increase tolerance for ADL and work activities. PROGRESSING; NOT MET  4. Pt will report at </= 35% impaired on ANKLE FOTO score to demo increased functional mobility. PROGRESSING; NOT MET  5. Pt will be able to ambulate community distances and negotiate stairs with minimal ankle pain for increased functional mobility and QoL. PROGRESSING; NOT MET    PLAN     Cont per POC, progress WBAT on LLE with AD with ASO    Rohith Cisneros, PTA

## 2023-04-13 ENCOUNTER — CLINICAL SUPPORT (OUTPATIENT)
Dept: REHABILITATION | Facility: HOSPITAL | Age: 28
End: 2023-04-13
Payer: OTHER MISCELLANEOUS

## 2023-04-13 DIAGNOSIS — R53.1 DECREASED STRENGTH: ICD-10-CM

## 2023-04-13 DIAGNOSIS — M25.672 DECREASED RANGE OF MOTION OF LEFT ANKLE: Primary | ICD-10-CM

## 2023-04-13 PROCEDURE — 97110 THERAPEUTIC EXERCISES: CPT | Mod: PN,CQ

## 2023-04-13 PROCEDURE — 97112 NEUROMUSCULAR REEDUCATION: CPT | Mod: PN,CQ

## 2023-04-13 PROCEDURE — 97530 THERAPEUTIC ACTIVITIES: CPT | Mod: PN,CQ

## 2023-04-13 NOTE — PROGRESS NOTES
"[facility]   Physical Therapy Treatment Note     Name: Magda Lowery  Clinic Number: [id number]    Therapy Diagnosis:   No diagnosis found.      Physician: Liudmila Monge PA-C  Visit Date: 4/13/2023  Physician Orders: PT Eval and Treat  Medical Diagnosis from Referral:   S82.842A (ICD-10-CM) - Bimalleolar ankle fracture, left, closed, initial encounter   S83.005D (ICD-10-CM) - Closed dislocation of left patella, subsequent encounter      Evaluation Date: 2/17/2023  Authorization Period Expiration: 02/06/2024  Plan of Care Expiration: 4/14/23  Progress Note Due: 4/14/23  Visit # / Visits authorized: 15/20 (+1)  FOTO: 5/10 - Performed 3/21/23   PTA visit 2/6    Precautions: Standard, asthma, hypothyroidism, left knee range of motion as tolerated, left ankle WBAT with boot    Time In: 9:00 AM  Time Out: 9:54 AM  Total Billable Time: 53 minutes (1 TE, 1 TA, 2 NMR,)    SUBJECTIVE     Pt reports: Primary c/o of exercise related soreness in HS and adductors of LLE.     She was compliant with home exercise program.  Response to previous treatment: increased LLE WB   Functional change: walking more    Pain: 0/10  Location: left ankle  Pain: 0/10, when stretching  Location: left knee      OBJECTIVE     Objective Measures updated at progress report unless specified.     3/21/23:  Left knee flexion AROM: 123 degree   Unable to do left cybex leg extension due to weakness    Treatment     Magda received the treatments listed below:         therapeutic exercises to develop strength, endurance, ROM, flexibility, posture, and core stabilization for 08 minutes including testing:    NuStep: 8', L3    therapeutic activities to improve functional performance for 20  minutes, including:    Step ups: 4" step 2x10 BLE.   Lateral step ups: 2x10, left, 4 inch step  Staggered STS (R ahead of L) 2x10   Leg Press DL: 3x10 seat 3 9 plates   Left Leg Press: seat 3, 4 plates 2x10    neuromuscular re-education activities " "to improve: Balance, Coordination, Proprioception, and Posture for 25 minutes. The following activities were included:      -TKE @ multi hip: 4 plates 20x5" holds left   -Toe taps on foam :Cone x30 fwd and cross midline   -Tandem Laps on foam beam  // bar 5 laps  -Lateral walking on foam  in // bars: 5 laps with NO  upper extremity use  -Backwards Walking in // bar 5 laps with No UE support   - Single leg stance 10x10"   - 90 90 hip lift with RLE march 2x20     Not today:    Gait training for 16 minutes: with and AFO    Ladder walks:  - Step to leading with right: 2 laps  - reciprocal: 3 laps  - Lateral walks: 2 laps   - Icky shuffle: 2 laps  - facing ladder in/in out/out: 2 laps each        Patient Education and Home Exercises     Home Exercises Provided and Patient Education Provided   Education provided:   - proper foot wear  - course of therapy, prognosis  - importance of home exercise program - updated 3/21/23    Written Home Exercises Provided: yes. Exercises were reviewed and Magda was able to demonstrate them prior to the end of the session.  Magda demonstrated good  understanding of the education provided. See EMR under Patient Instructions for exercises provided during therapy sessions    ASSESSMENT     Magda is a 27 y.o. female referred to outpatient Physical Therapy with a medical diagnosis of Left Bimalleolar ankle fracture and Closed dislocation of left patella presenting to PT at [facility] and [facility]. She received a left ankle ORIF on 1/6/23 and was non-operative knee immobilizer for left lateral patellar dislocation (reduced at external rotation visit on say of injury 12/29/22.    Attempted to progress lateral step up/down to lateral step down (heel tap) to improve LLE control, patient unable to perform activity with control/stability and deferred back to lateral step up/down. Progressed reps of 90 90 hip lift for improved LLE strength and motor cntrol. Patient tolerates tx with " primary c/o LLE soreness.     Magda Is progressing well towards her goals.   Pt prognosis is Excellent.     Pt will continue to benefit from skilled outpatient physical therapy to address the deficits listed in the problem list box on initial evaluation, provide pt/family education and to maximize pt's level of independence in the home and community environment.     Pt's spiritual, cultural and educational needs considered and pt agreeable to plan of care and goals.     Anticipated barriers to physical therapy: comorbidities, fear and avoidance of left ankle/knee ROM    Goals:   GOALS: Short Term Goals:  4 weeks  1. Report decreased left ankle and knee pain </= 3/10 with standing and walking with AD to increase tolerance for ADLs. MET  2. Increase left ankle AROM by 5 degrees in order to walk with min to no compensation. PROGRESSING; NOT MET  3. Pt will demo good sitting and standing posture for improved spine and joint alignment for improved biomechanics. MET  4. Pt to tolerate HEP to improve ROM and independence with ADL's. MET  5. Pt will perform standing squat without AD with equal weight bearing on each lower extremity for standing ADLs.  PROGRESSING; NOT MET     Long Term Goals: 8 weeks  1. Report decreased left ankle and knee pain </= 1/10 with walking and stair negotiation without AD to increase tolerance for increased QoL and improved ADLs. PROGRESSING; NOT MET  2. Patient goal: to get back to walking normally. PROGRESSING; NOT MET  3. Increase strength to >/= 4+/5 for BLE to increase tolerance for ADL and work activities. PROGRESSING; NOT MET  4. Pt will report at </= 35% impaired on ANKLE FOTO score to demo increased functional mobility. PROGRESSING; NOT MET  5. Pt will be able to ambulate community distances and negotiate stairs with minimal ankle pain for increased functional mobility and QoL. PROGRESSING; NOT MET    PLAN     Cont per POC, progress WBAT on LLE with AD with ASO    Rohith Cisneros, AILEEN  "                          [facility]   Physical Therapy Treatment Note     Name: Magda Lowery  Clinic Number: [id number]    Therapy Diagnosis:   No diagnosis found.      Physician: Liudmila Monge PA-C  Visit Date: 4/13/2023  Physician Orders: PT Eval and Treat  Medical Diagnosis from Referral:   S82.842A (ICD-10-CM) - Bimalleolar ankle fracture, left, closed, initial encounter   S83.005D (ICD-10-CM) - Closed dislocation of left patella, subsequent encounter      Evaluation Date: 2/17/2023  Authorization Period Expiration: 02/06/2024  Plan of Care Expiration: 4/14/23  Progress Note Due: 4/14/23  Visit # / Visits authorized: 14/20 (+1)  FOTO: 5/10 - Performed 3/21/23   PTA visit 1/6    Precautions: Standard, asthma, hypothyroidism, left knee range of motion as tolerated, left ankle WBAT with boot    Time In: 8:04 AM  Time Out: 9:00 AM  Total Billable Time: 53 minutes (1 TA, 2 NMR, 1 GT)    SUBJECTIVE     Pt reports: Patient reports she is standing longer with less discomfort. Patient reports she notices she can stand a little longer noting she can stand 10 minutes in a single bout.               She was compliant with home exercise program.  Response to previous treatment: increased LLE WB   Functional change: walking more    Pain: 0/10  Location: left ankle  Pain: 0/10, when stretching  Location: left knee      OBJECTIVE     Objective Measures updated at progress report unless specified.     3/21/23:  Left knee flexion AROM: 123 degree   Unable to do left cybex leg extension due to weakness    Treatment     Magda received the treatments listed below:         ttherapeutic exercises to develop strength, endurance, ROM, flexibility, posture, and core stabilization for 00 minutes including testing:     NuStep: 8', L3     therapeutic activities to improve functional performance for 25  minutes, including:     Step ups: 4" step 2x10 BLE.   Lateral step ups: 2x10, left, 4 inch step  Staggered " "STS (R ahead of L) 2x10 sitting on airex pad  Leg Press DL: 3x10 seat 3 9 plates   Left Leg Press: seat 3, 4 plates 2x10     neuromuscular re-education activities to improve: Balance, Coordination, Proprioception, and Posture for 28 minutes. The following activities were included:        -TKE @ multi hip: 4 plates 20x5" holds left   -Toe taps on airex :Conex x30 fwd and cross midline   -Tandem on foam 1' ea  -Tandem laps on airex beam x4 laps.   -Backwards Walking in // bar 5 laps with No UE support   -Lateral walking in // bars: 5 laps with NO  upper extremity use  - Single leg stance 10x10"   - 90 90 hip lift with RLE march x20      Not today:     Gait training for 16 minutes: with and AFO     Ladder walks:  - Step to leading with right: 2 laps  - reciprocal: 3 laps  - Lateral walks: 2 laps   - Icky shuffle: 2 laps  - facing ladder in/in out/out: 2 laps each           Patient Education and Home Exercises     Home Exercises Provided and Patient Education Provided   Education provided:   - proper foot wear  - course of therapy, prognosis  - importance of home exercise program - updated 3/21/23    Written Home Exercises Provided: yes. Exercises were reviewed and Magda was able to demonstrate them prior to the end of the session.  Magda demonstrated good  understanding of the education provided. See EMR under Patient Instructions for exercises provided during therapy sessions    ASSESSMENT     Magda is a 27 y.o. female referred to outpatient Physical Therapy with a medical diagnosis of Left Bimalleolar ankle fracture and Closed dislocation of left patella presenting to PT at [facility] and [facility]. She received a left ankle ORIF on 1/6/23 and was non-operative knee immobilizer for left lateral patellar dislocation (reduced at external rotation visit on say of injury 12/29/22.    Initiated tandem laps on airex beam and progressed cone taps to  stand on airex beam for dynamic balance challenge on " unstable surface. Patient with good performance of 90 90 hip lift with hip shift suggesting performance at home. Patient reports since practicing  90 90 hip lift, her single leg balance has improved with decreased pain in plantar surface of foot.     Magda Is progressing well towards her goals.   Pt prognosis is Excellent.     Pt will continue to benefit from skilled outpatient physical therapy to address the deficits listed in the problem list box on initial evaluation, provide pt/family education and to maximize pt's level of independence in the home and community environment.     Pt's spiritual, cultural and educational needs considered and pt agreeable to plan of care and goals.     Anticipated barriers to physical therapy: comorbidities, fear and avoidance of left ankle/knee ROM    Goals:   GOALS: Short Term Goals:  4 weeks  1. Report decreased left ankle and knee pain </= 3/10 with standing and walking with AD to increase tolerance for ADLs. MET  2. Increase left ankle AROM by 5 degrees in order to walk with min to no compensation. PROGRESSING; NOT MET  3. Pt will demo good sitting and standing posture for improved spine and joint alignment for improved biomechanics. MET  4. Pt to tolerate HEP to improve ROM and independence with ADL's. MET  5. Pt will perform standing squat without AD with equal weight bearing on each lower extremity for standing ADLs.  PROGRESSING; NOT MET     Long Term Goals: 8 weeks  1. Report decreased left ankle and knee pain </= 1/10 with walking and stair negotiation without AD to increase tolerance for increased QoL and improved ADLs. PROGRESSING; NOT MET  2. Patient goal: to get back to walking normally. PROGRESSING; NOT MET  3. Increase strength to >/= 4+/5 for BLE to increase tolerance for ADL and work activities. PROGRESSING; NOT MET  4. Pt will report at </= 35% impaired on ANKLE FOTO score to demo increased functional mobility. PROGRESSING; NOT MET  5. Pt will be able to  ambulate community distances and negotiate stairs with minimal ankle pain for increased functional mobility and QoL. PROGRESSING; NOT MET    PLAN     Cont per POC, progress WBAT on LLE with AD with ASO    Rohith Cisneros, PTA

## 2023-04-18 ENCOUNTER — CLINICAL SUPPORT (OUTPATIENT)
Dept: REHABILITATION | Facility: HOSPITAL | Age: 28
End: 2023-04-18
Payer: COMMERCIAL

## 2023-04-18 ENCOUNTER — DOCUMENTATION ONLY (OUTPATIENT)
Dept: REHABILITATION | Facility: HOSPITAL | Age: 28
End: 2023-04-18

## 2023-04-18 DIAGNOSIS — M25.672 DECREASED RANGE OF MOTION OF LEFT ANKLE: Primary | ICD-10-CM

## 2023-04-18 DIAGNOSIS — R53.1 DECREASED STRENGTH: ICD-10-CM

## 2023-04-18 PROCEDURE — 97110 THERAPEUTIC EXERCISES: CPT | Mod: PN | Performed by: PHYSICAL THERAPIST

## 2023-04-18 PROCEDURE — 97112 NEUROMUSCULAR REEDUCATION: CPT | Mod: PN | Performed by: PHYSICAL THERAPIST

## 2023-04-18 PROCEDURE — 97530 THERAPEUTIC ACTIVITIES: CPT | Mod: PN | Performed by: PHYSICAL THERAPIST

## 2023-04-18 PROCEDURE — 97140 MANUAL THERAPY 1/> REGIONS: CPT | Mod: PN | Performed by: PHYSICAL THERAPIST

## 2023-04-18 NOTE — PROGRESS NOTES
[facility]   Physical Therapy Treatment Note     Name: Magda Lowery  Clinic Number: [id number]    Therapy Diagnosis:   Encounter Diagnoses   Name Primary?    Decreased range of motion of left ankle Yes    Decreased strength          Physician: Liudmila Monge PA-C  Visit Date: 4/18/2023  Physician Orders: PT Eval and Treat  Medical Diagnosis from Referral:   S82.842A (ICD-10-CM) - Bimalleolar ankle fracture, left, closed, initial encounter   S83.005D (ICD-10-CM) - Closed dislocation of left patella, subsequent encounter      Evaluation Date: 2/17/2023  Authorization Period Expiration: 02/06/2024  Plan of Care Expiration: 5/30/23  Progress Note Due: 5/30/23  Visit # / Visits authorized: 17/20 (+2)  FOTO: 6/10 - Performed 3/21/23   PTA visit 0/6    Precautions: Standard, asthma, hypothyroidism, left knee range of motion as tolerated, left ankle WBAT with boot    Time In: 9:00 AM  Time Out: 10:00 AM  Total Billable Time: 56 minutes (1 TE, 1 TA, 1 NMR, 1 MT)    SUBJECTIVE     Pt reports: she's going the whole day at work without her boot. She iced at lunch and at 5 pm, but did well. She's also not really using her crutches anymore.    She was compliant with home exercise program.  Response to previous treatment: increased LLE WB   Functional change: walking more    Pain: 0/10  Location: left ankle  Pain: 0/10, when stretching  Location: left knee      OBJECTIVE     Objective Measures updated at progress report unless specified.   See UPOC on 4/18/33    Treatment     Magda received the treatments listed below:        manual therapy techniques: Joint mobilizations, Myofacial release, and Soft tissue Mobilization were applied to the: ankle for 8 minutes, including:  TC A to P mobs   TC distraction  Dorsiflexion PROM      therapeutic exercises to develop strength, endurance, ROM, flexibility, posture, and core stabilization for 18 minutes including testing:    Standing alternating dorsiflexion:  "20x with back at wall to avoid compensation    Not today:  NuStep: 8', L3    therapeutic activities to improve functional performance for 15  minutes, including:    Step ups: 4" step 2x10 BLE.   Shuttle left heel raise: 1 upper cord 2x10  Shuttle left heel raise hold: 1 upper cord 3x20" holds    Not today  Lateral step ups: 2x10, left, 4 inch step  Staggered STS (R ahead of L) 2x10   Leg Press DL: 3x10 seat 3 9 plates   Left Leg Press: seat 3, 4 plates 2x10    neuromuscular re-education activities to improve: Balance, Coordination, Proprioception, and Posture for 15 minutes. The following activities were included:      -dorsiflexion lunge at step: 20x5" holds  -Single leg stance 10x10"   -90 90 hip lift with RLE march 2x20     Not today:  -TKE @ multi hip: 4 plates 20x5" holds left   -Toe taps on foam :Cone x30 fwd and cross midline   -Tandem Laps on foam beam  // bar 5 laps  -Lateral walking on foam  in // bars: 5 laps with NO  upper extremity use  -Backwards Walking in // bar 5 laps with No UE support   Gait training for 00 minutes: with and AFO    Ladder walks:  - Step to leading with right: 2 laps  - reciprocal: 3 laps  - Lateral walks: 2 laps   - Icky shuffle: 2 laps  - facing ladder in/in out/out: 2 laps each        Patient Education and Home Exercises     Home Exercises Provided and Patient Education Provided   Education provided:   - proper foot wear  - course of therapy, prognosis  - importance of home exercise program - updated 3/21/23    Written Home Exercises Provided: yes. Exercises were reviewed and Magda was able to demonstrate them prior to the end of the session.  Magda demonstrated good  understanding of the education provided. See EMR under Patient Instructions for exercises provided during therapy sessions    ASSESSMENT     Magda is a 27 y.o. female referred to outpatient Physical Therapy with a medical diagnosis of Left Bimalleolar ankle fracture and Closed dislocation of left patella presenting " to PT at [facility] and [facility]. She received a left ankle ORIF on 1/6/23 and was non-operative knee immobilizer for left lateral patellar dislocation (reduced at external rotation visit on say of injury 12/29/22.  See UPOC.    Magda Is progressing well towards her goals.   Pt prognosis is Excellent.     Pt will continue to benefit from skilled outpatient physical therapy to address the deficits listed in the problem list box on initial evaluation, provide pt/family education and to maximize pt's level of independence in the home and community environment.     Pt's spiritual, cultural and educational needs considered and pt agreeable to plan of care and goals.     Anticipated barriers to physical therapy: comorbidities, fear and avoidance of left ankle/knee ROM    Goals:   GOALS: Short Term Goals:  4 weeks  1. Report decreased left ankle and knee pain </= 3/10 with standing and walking with AD to increase tolerance for ADLs. MET  2. Increase left ankle AROM by 5 degrees in order to walk with min to no compensation. MET  3. Pt will demo good sitting and standing posture for improved spine and joint alignment for improved biomechanics. MET  4. Pt to tolerate HEP to improve ROM and independence with ADL's. MET  5. Pt will perform standing squat without AD with equal weight bearing on each lower extremity for standing ADLs.  PROGRESSING; NOT MET     Long Term Goals: 8 weeks  1. Report decreased left ankle and knee pain </= 1/10 with walking and stair negotiation without AD to increase tolerance for increased QoL and improved ADLs. PROGRESSING; NOT MET  2. Patient goal: to get back to walking normally. PROGRESSING; NOT MET  3. Increase strength to >/= 4+/5 for BLE to increase tolerance for ADL and work activities. PROGRESSING; NOT MET  4. Pt will report at </= 35% impaired on ANKLE FOTO score to demo increased functional mobility. PROGRESSING; NOT MET  5. Pt will be able to ambulate community distances  and negotiate stairs with minimal ankle pain for increased functional mobility and QoL. PROGRESSING; NOT MET    PLAN     Cont per POC, progress WBAT on LLE without device or ASO  Progress quad, lateral hip and calf strengthening    Berhane Masters, PT

## 2023-04-18 NOTE — PROGRESS NOTES
PT met face to face with Rohith Cisneros PTA to discuss pt's treatment plan and progress towards established goals. Pt will be seen by a physical therapist minimally every 6th visit or every 30 days.    Rohith Cisneros PTA

## 2023-04-18 NOTE — PLAN OF CARE
Outpatient Therapy Updated Plan of Care     Visit Date: 2023  Name: Magda Lowery  Clinic Number: [id number]    Therapy Diagnosis:   Encounter Diagnoses   Name Primary?    Decreased range of motion of left ankle Yes    Decreased strength      Physician: Liudmila Monge PA-C    Physician Orders: PT Eval and Treat  Medical Diagnosis from Referral:   S82.842A (ICD-10-CM) - Bimalleolar ankle fracture, left, closed, initial encounter   S83.005D (ICD-10-CM) - Closed dislocation of left patella, subsequent encounter      Evaluation Date: 2023      Total Visits Received: 18    Current Certification Period:  23 to 23  Precautions:  standard  Visits from Evaluation Date:  17      Subjective     Update: she's going the whole day at work without her boot. She iced at lunch and at 5 pm, but did well. She's also not really using her crutches anymore. Her worst pain is stiffness, which is worse when she first gets up in the morning. After the first 2 steps, she's fine. She forgot to wear her ASO all day  and did well.    Objective     Update:   Single leg stance max: 18 seconds; ASO on  Gait: no device; left trendelenberg, mild decreased stance time, Bilateral toe out, ASO on     () = new measurements on 23  Ankle   Right     Left   Pain/Dysfunction with Movement     AROM PROM MMT AROM PROM MMT     Great toe (45/70 deg) -- -- 5/5 -- -- 4/5     Plantarflexion (50 deg WNL WNL 5/5 53 54 3+/5     Dorsiflexion (20 deg) WNL WNL 5/5 -4 (4) -2 (6) 4+/5     Inversion WNL WNL 5/5 16 (22) 20 (24) 4+/5     Eversion WNL WNL 5/5 14 16 4+/5       Strength:  Left hip abduction: 5/5  Left hamstrin/5  Quad via microfet: right: 24.3 kg, left: 18.2 kg    After manuals, dorsiflexion AROM 8 degree, PROM 12 degree   Double leg heel raise: 2/3 weight on R      Assessment     Update: Magda has now completed 18 PT visits. She's now no longer wearing her boot or using crutches. She presents with decreased dorsiflexion,  which improved today with manuals. Improved left quad strength, but still < 20 kg per microFET. Initiated more calf strengthening today with mild achilles soreness. She's being progressed to not using ASO at home. Pain is not a limiter at this time.     Previous Short Term Goals Status:     1. Report decreased left ankle and knee pain </= 3/10 with standing and walking with AD to increase tolerance for ADLs. MET  2. Increase left ankle AROM by 5 degrees in order to walk with min to no compensation. MET  3. Pt will demo good sitting and standing posture for improved spine and joint alignment for improved biomechanics. MET  4. Pt to tolerate HEP to improve ROM and independence with ADL's. MET  5. Pt will perform standing squat without AD with equal weight bearing on each lower extremity for standing ADLs.  PROGRESSING; NOT MET  New Short Term Goals Status:   none  Long Term Goal Status:   continue per initial plan of care.  Reasons for Recertification of Therapy:   UPOC. Progressing weight bearing and calf strength    Plan     Updated Certification Period: 4/18/2023 to 5/30/23  Recommended Treatment Plan: 2 times per week for 6 weeks: Electrical Stimulation TENS, IFC, NMES, Gait Training, Manual Therapy, Moist Heat/ Ice, Neuromuscular Re-ed, Patient Education, Self Care, Therapeutic Activities, Therapeutic Exercise, and dry needling  Other Recommendations: no ASO in PT. Progress calf, lateral hip and quad strength    Berhane Masters, PT  4/18/2023      I CERTIFY THE NEED FOR THESE SERVICES FURNISHED UNDER THIS PLAN OF TREATMENT AND WHILE UNDER MY CARE    Physician's comments:        Physician's Signature: ___________________________________________________

## 2023-04-21 ENCOUNTER — HOSPITAL ENCOUNTER (OUTPATIENT)
Dept: RADIOLOGY | Facility: HOSPITAL | Age: 28
Discharge: HOME | End: 2023-04-21
Attending: FAMILY MEDICINE
Payer: COMMERCIAL

## 2023-04-21 DIAGNOSIS — E04.1 THYROID NODULE: ICD-10-CM

## 2023-04-21 DIAGNOSIS — E04.1 CYSTIC THYROID NODULE: ICD-10-CM

## 2023-04-21 PROCEDURE — 76536 US EXAM OF HEAD AND NECK: CPT | Mod: 26,,, | Performed by: STUDENT IN AN ORGANIZED HEALTH CARE EDUCATION/TRAINING PROGRAM

## 2023-04-21 PROCEDURE — 76536 US SOFT TISSUE HEAD NECK THYROID: ICD-10-PCS | Mod: 26,,, | Performed by: STUDENT IN AN ORGANIZED HEALTH CARE EDUCATION/TRAINING PROGRAM

## 2023-04-21 PROCEDURE — 76536 US EXAM OF HEAD AND NECK: CPT | Mod: TC

## 2023-04-25 ENCOUNTER — CLINICAL SUPPORT (OUTPATIENT)
Dept: REHABILITATION | Facility: HOSPITAL | Age: 28
End: 2023-04-25
Payer: COMMERCIAL

## 2023-04-25 DIAGNOSIS — R53.1 DECREASED STRENGTH: ICD-10-CM

## 2023-04-25 DIAGNOSIS — M25.672 DECREASED RANGE OF MOTION OF LEFT ANKLE: Primary | ICD-10-CM

## 2023-04-25 PROCEDURE — 97112 NEUROMUSCULAR REEDUCATION: CPT | Mod: PN,CQ

## 2023-04-25 PROCEDURE — 97530 THERAPEUTIC ACTIVITIES: CPT | Mod: PN,CQ

## 2023-04-25 NOTE — PROGRESS NOTES
[facility]   Physical Therapy Treatment Note     Name: Magda Lowery  [facility] Number: [id number]    Therapy Diagnosis:   Encounter Diagnoses   Name Primary?    Decreased range of motion of left ankle Yes    Decreased strength            Physician: Liudmila Monge PA-C  Visit Date: 4/25/2023  Physician Orders: PT Eval and Treat  Medical Diagnosis from Referral:   S82.842A (ICD-10-CM) - Bimalleolar ankle fracture, left, closed, initial encounter   S83.005D (ICD-10-CM) - Closed dislocation of left patella, subsequent encounter      Evaluation Date: 2/17/2023  Authorization Period Expiration: 02/06/2024  Plan of Care Expiration: 5/30/23  Progress Note Due: 5/30/23  Visit # / Visits authorized: 18/20 (+2)  FOTO: 7/10 - Performed 3/21/23   PTA visit 1/6    Precautions: Standard, asthma, hypothyroidism, left knee range of motion as tolerated, left ankle WBAT with boot    Time In: 9:00 AM  Time Out: 10:00 AM  Total Billable Time: 49 minutes ( 2 TA, 1 NMR, )    SUBJECTIVE     Pt reports: No new complaints.     She was compliant with home exercise program.  Response to previous treatment: increased LLE WB   Functional change: walking more    Pain: 0/10  Location: left ankle  Pain: 0/10, when stretching  Location: left knee      OBJECTIVE     Objective Measures updated at progress report unless specified.   See UPOC on 4/18/33    Treatment     Magda received the treatments listed below:        manual therapy techniques: Joint mobilizations, Myofacial release, and Soft tissue Mobilization were applied to the: ankle for 0 minutes, including:  TC A to P mobs   TC distraction  Dorsiflexion PROM      therapeutic exercises to develop strength, endurance, ROM, flexibility, posture, and core stabilization for 00 minutes including testing:    Standing alternating dorsiflexion: 20x with back at wall to avoid compensation    Not today:  NuStep: 8', L3    therapeutic activities to improve functional performance  "for 23  minutes, including:    Step ups: 4" step 2x10 BLE.   Shuttle left heel raise: 1 upper cord 2x10  Shuttle left heel raise hold: 1 upper cord 4x20" holds  Leg Press DL: 3x10 seat 3 9 plates   Left Leg Press: seat 3, 4 plates 2x10    Not today  Lateral step ups: 2x10, left, 4 inch step  Staggered STS (R ahead of L) 2x10       neuromuscular re-education activities to improve: Balance, Coordination, Proprioception, and Posture for 26 minutes. The following activities were included:      -dorsiflexion lunge at step: 20x5" holds  -Single leg stance 10x10"   -90 90 hip lift with RLE march 2x20 (emphasis on R hip shift)   -TKE @ multi hip: 4 plates 20x5" holds left   -Toe taps on foam :Cone x30 fwd and cross midline   -Tandem Laps on foam beam  // bar 5 laps  -Lateral walking on foam  in // bars: 5 laps with NO  upper extremity use  -Backwards Walking in // bar 5 laps with No UE support     Not Today:   Gait training for 00 minutes: with and AFO    Ladder walks:  - Step to leading with right: 2 laps  - reciprocal: 3 laps  - Lateral walks: 2 laps   - Icky shuffle: 2 laps  - facing ladder in/in out/out: 2 laps each        Patient Education and Home Exercises     Home Exercises Provided and Patient Education Provided   Education provided:   - proper foot wear  - course of therapy, prognosis  - importance of home exercise program - updated 3/21/23    Written Home Exercises Provided: yes. Exercises were reviewed and Magda was able to demonstrate them prior to the end of the session.  Magda demonstrated good  understanding of the education provided. See EMR under Patient Instructions for exercises provided during therapy sessions    ASSESSMENT     Patient tolerates tx well with out provocation of pain, TC for acetabulum on L femur IR to improve LLE control in DF lunge on step. Resumed neuro re ed activities with emphasis on ankle stability and balance.     Magda is a 27 y.o. female referred to outpatient Physical Therapy " with a medical diagnosis of Left Bimalleolar ankle fracture and Closed dislocation of left patella presenting to PT at [facility]. She received a left ankle ORIF on 1/6/23 and was non-operative knee immobilizer for left lateral patellar dislocation (reduced at external rotation visit on say of injury 12/29/22.  See UPOC.    Magda Is progressing well towards her goals.   Pt prognosis is Excellent.     Pt will continue to benefit from skilled outpatient physical therapy to address the deficits listed in the problem list box on initial evaluation, provide pt/family education and to maximize pt's level of independence in the home and community environment.     Pt's spiritual, cultural and educational needs considered and pt agreeable to plan of care and goals.     Anticipated barriers to physical therapy: comorbidities, fear and avoidance of left ankle/knee ROM    Goals:   GOALS: Short Term Goals:  4 weeks  1. Report decreased left ankle and knee pain </= 3/10 with standing and walking with AD to increase tolerance for ADLs. MET  2. Increase left ankle AROM by 5 degrees in order to walk with min to no compensation. MET  3. Pt will demo good sitting and standing posture for improved spine and joint alignment for improved biomechanics. MET  4. Pt to tolerate HEP to improve ROM and independence with ADL's. MET  5. Pt will perform standing squat without AD with equal weight bearing on each lower extremity for standing ADLs.  PROGRESSING; NOT MET     Long Term Goals: 8 weeks  1. Report decreased left ankle and knee pain </= 1/10 with walking and stair negotiation without AD to increase tolerance for increased QoL and improved ADLs. PROGRESSING; NOT MET  2. Patient goal: to get back to walking normally. PROGRESSING; NOT MET  3. Increase strength to >/= 4+/5 for BLE to increase tolerance for ADL and work activities. PROGRESSING; NOT MET  4. Pt will report at </= 35% impaired on ANKLE FOTO score to  demo increased functional mobility. PROGRESSING; NOT MET  5. Pt will be able to ambulate community distances and negotiate stairs with minimal ankle pain for increased functional mobility and QoL. PROGRESSING; NOT MET    PLAN     Cont per POC, progress WBAT on LLE without device or ASO  Progress quad, lateral hip and calf strengthening    Rohith Cisneros, PTA

## 2023-04-27 ENCOUNTER — PATIENT MESSAGE (OUTPATIENT)
Dept: ADMINISTRATIVE | Facility: OTHER | Age: 28
End: 2023-04-27
Payer: COMMERCIAL

## 2023-04-27 ENCOUNTER — CLINICAL SUPPORT (OUTPATIENT)
Dept: REHABILITATION | Facility: HOSPITAL | Age: 28
End: 2023-04-27
Payer: COMMERCIAL

## 2023-04-27 DIAGNOSIS — R53.1 DECREASED STRENGTH: ICD-10-CM

## 2023-04-27 DIAGNOSIS — M25.672 DECREASED RANGE OF MOTION OF LEFT ANKLE: Primary | ICD-10-CM

## 2023-04-27 NOTE — PROGRESS NOTES
[facility]   Physical Therapy Treatment Note     Name: Magda Lowery  [facility] Number: [id number]    Therapy Diagnosis:   Encounter Diagnoses   Name Primary?    Decreased range of motion of left ankle Yes    Decreased strength            Physician: Liudmila Monge PA-C  Visit Date: 4/27/2023  Physician Orders: PT Eval and Treat  Medical Diagnosis from Referral:   S82.842A (ICD-10-CM) - Bimalleolar ankle fracture, left, closed, initial encounter   S83.005D (ICD-10-CM) - Closed dislocation of left patella, subsequent encounter      Evaluation Date: 2/17/2023  Authorization Period Expiration: 02/06/2024  Plan of Care Expiration: 5/30/23  Progress Note Due: 5/30/23  Visit # / Visits authorized: 18/20 (+2)  FOTO: 7/10 - Performed 3/21/23   PTA visit 1/6    Precautions: Standard, asthma, hypothyroidism, left knee range of motion as tolerated, left ankle WBAT with boot    Time In: 9:00 AM  Time Out: 10:00 AM  Total Billable Time:   53 minutes ( 2 TA, 2 NMR, )    SUBJECTIVE     Pt reports: No new complaints.     She was compliant with home exercise program.  Response to previous treatment: increased LLE WB   Functional change: walking more    Pain: 0/10  Location: left ankle  Pain: 0/10, when stretching  Location: left knee      OBJECTIVE     Objective Measures updated at progress report unless specified.   See UPOC on 4/18/33    Treatment     Magda received the treatments listed below:        manual therapy techniques: Joint mobilizations, Myofacial release, and Soft tissue Mobilization were applied to the: ankle for 0 minutes, including:  TC A to P mobs   TC distraction  Dorsiflexion PROM      therapeutic exercises to develop strength, endurance, ROM, flexibility, posture, and core stabilization for 00 minutes including testing:    Standing alternating dorsiflexion: 20x with back at wall to avoid compensation    Not today:  NuStep: 8', L3    therapeutic activities to improve functional performance  "for 23  minutes, including:    Step ups: 4" step 2x10 BLE.   Shuttle left heel raise: 1 upper cord 2x10  Shuttle left heel raise hold: 1 upper cord 4x20" holds  Leg Press DL: 3x10 seat 3 9 plates   Left Leg Press: seat 3, 4 plates 2x10    Not today  Lateral step ups: 2x10, left, 4 inch step  Staggered STS (R ahead of L) 2x10       neuromuscular re-education activities to improve: Balance, Coordination, Proprioception, and Posture for 30 minutes. The following activities were included:      -dorsiflexion lunge at step: 20x5" holds  -Single leg stance 10x10"   -90 90 hip lift with RLE march 2x20 (emphasis on R hip shift)   -TKE @ multi hip: 4 plates 20x5" holds left   -Toe taps on foam :Cone x30 fwd and cross midline   -Tandem Laps on foam beam  // bar 5 laps  -Lateral walking on foam  in // bars: 5 laps with NO  upper extremity use  -Backwards Walking in // bar 5 laps with No UE support     Not Today:   Gait training for 00 minutes: with and AFO    Ladder walks:  - Step to leading with right: 2 laps  - reciprocal: 3 laps  - Lateral walks: 2 laps   - Icky shuffle: 2 laps  - facing ladder in/in out/out: 2 laps each        Patient Education and Home Exercises     Home Exercises Provided and Patient Education Provided   Education provided:   - proper foot wear  - course of therapy, prognosis  - importance of home exercise program - updated 3/21/23    Written Home Exercises Provided: yes. Exercises were reviewed and Magda was able to demonstrate them prior to the end of the session.  Magda demonstrated good  understanding of the education provided. See EMR under Patient Instructions for exercises provided during therapy sessions    ASSESSMENT     Tx provided by Matt Cisneros this visit and not by or cotreat with   Berhane Masters PT on this day 4/27/2023    Patient tolerates tx well with out provocation of pain. Start of tx patient performed 90 90 hip lift with emphasis on R hip shift for L acetabulum on femur IR for improved " closed chain neuromuscular control of pelvis on stance leg. Post activity, patient reports feeling more stable on LLE and better able to control LLE while performing DF lunge with out needing TC of PTA.     Magda is a 27 y.o. female referred to outpatient Physical Therapy with a medical diagnosis of Left Bimalleolar ankle fracture and Closed dislocation of left patella presenting to PT at [facility]. She received a left ankle ORIF on 1/6/23 and was non-operative knee immobilizer for left lateral patellar dislocation (reduced at external rotation visit on say of injury 12/29/22.  See UPOC.    Magda Is progressing well towards her goals.   Pt prognosis is Excellent.     Pt will continue to benefit from skilled outpatient physical therapy to address the deficits listed in the problem list box on initial evaluation, provide pt/family education and to maximize pt's level of independence in the home and community environment.     Pt's spiritual, cultural and educational needs considered and pt agreeable to plan of care and goals.     Anticipated barriers to physical therapy: comorbidities, fear and avoidance of left ankle/knee ROM    Goals:   GOALS: Short Term Goals:  4 weeks  1. Report decreased left ankle and knee pain </= 3/10 with standing and walking with AD to increase tolerance for ADLs. MET  2. Increase left ankle AROM by 5 degrees in order to walk with min to no compensation. MET  3. Pt will demo good sitting and standing posture for improved spine and joint alignment for improved biomechanics. MET  4. Pt to tolerate HEP to improve ROM and independence with ADL's. MET  5. Pt will perform standing squat without AD with equal weight bearing on each lower extremity for standing ADLs.  PROGRESSING; NOT MET     Long Term Goals: 8 weeks  1. Report decreased left ankle and knee pain </= 1/10 with walking and stair negotiation without AD to increase tolerance for increased QoL and improved  ADLs. PROGRESSING; NOT MET  2. Patient goal: to get back to walking normally. PROGRESSING; NOT MET  3. Increase strength to >/= 4+/5 for BLE to increase tolerance for ADL and work activities. PROGRESSING; NOT MET  4. Pt will report at </= 35% impaired on ANKLE FOTO score to demo increased functional mobility. PROGRESSING; NOT MET  5. Pt will be able to ambulate community distances and negotiate stairs with minimal ankle pain for increased functional mobility and QoL. PROGRESSING; NOT MET    PLAN     Cont per POC, progress WBAT on LLE without device or ASO  Progress quad, lateral hip and calf strengthening    Rohith Cisneros, PTA

## 2023-05-02 ENCOUNTER — CLINICAL SUPPORT (OUTPATIENT)
Dept: REHABILITATION | Facility: HOSPITAL | Age: 28
End: 2023-05-02
Payer: OTHER MISCELLANEOUS

## 2023-05-02 DIAGNOSIS — M25.672 DECREASED RANGE OF MOTION OF LEFT ANKLE: Primary | ICD-10-CM

## 2023-05-02 DIAGNOSIS — R53.1 DECREASED STRENGTH: ICD-10-CM

## 2023-05-02 PROCEDURE — 97530 THERAPEUTIC ACTIVITIES: CPT | Mod: PN,CQ

## 2023-05-02 PROCEDURE — 97112 NEUROMUSCULAR REEDUCATION: CPT | Mod: PN,CQ

## 2023-05-02 NOTE — PROGRESS NOTES
[facility]   Physical Therapy Treatment Note     Name: Magda Lowery  Clinic Number: [id number]    Therapy Diagnosis:   Encounter Diagnoses   Name Primary?    Decreased range of motion of left ankle Yes    Decreased strength            Physician: Liudmila Monge PA-C  Visit Date: 5/2/2023  Physician Orders: PT Eval and Treat  Medical Diagnosis from Referral:   S82.842A (ICD-10-CM) - Bimalleolar ankle fracture, left, closed, initial encounter   S83.005D (ICD-10-CM) - Closed dislocation of left patella, subsequent encounter      Evaluation Date: 2/17/2023  Authorization Period Expiration: 02/06/2024  Plan of Care Expiration: 5/30/23  Progress Note Due: 5/30/23  Visit # / Visits authorized: 18/20 (+2)  FOTO: 7/10 - Performed 3/21/23   PTA visit 1/6    Precautions: Standard, asthma, hypothyroidism, left knee range of motion as tolerated, left ankle WBAT with boot    Time In: 9:00 AM  Time Out: 10:00 AM  Total Billable Time:   53 minutes ( 2 TA, 2 NMR, )    SUBJECTIVE     Pt reports: Patient reports increased stiffness this morning. Patient relates typically stiffness abates with first few steps out of bed but this morning stiffness persisted up to arriving at PT appointment. Patient unsure if she slept wrong or if is result of doing DF lunge at step.     She was compliant with home exercise program.  Response to previous treatment: increased LLE WB   Functional change: walking more    Pain: 0/10  Location: left ankle  Pain: 0/10, when stretching  Location: left knee      OBJECTIVE     Objective Measures updated at progress report unless specified.   See UPOC on 4/18/33    Treatment     Magda received the treatments listed below:        manual therapy techniques: Joint mobilizations, Myofacial release, and Soft tissue Mobilization were applied to the: ankle for 0 minutes, including:  TC A to P mobs   TC distraction  Dorsiflexion PROM      therapeutic exercises to develop strength, endurance,  "ROM, flexibility, posture, and core stabilization for 00 minutes including testing:    Standing alternating dorsiflexion: 20x with back at wall to avoid compensation    Not today:  NuStep: 8', L3    therapeutic activities to improve functional performance for 23  minutes, including:    Step ups: 4" step 2x10 BLE.   Shuttle left heel raise: 1 upper cord 2x10  Shuttle left heel raise hold: 1 upper cord 4x20" holds  Lateral step ups: 2x10, left, 4 inch step  Staggered STS (R ahead of L) 2x10   Not today  Leg Press DL: 3x10 seat 3 9 plates   Left Leg Press: seat 3, 4 plates 2x10        neuromuscular re-education activities to improve: Balance, Coordination, Proprioception, and Posture for 30 minutes. The following activities were included:      -dorsiflexion lunge at step: 20x5" holds  -Single leg stance 10x10"   -90 90 hip lift with RLE march 2x20 (emphasis on R hip shift)  Not today  -TKE @ multi hip: 7 plates 20x5" holds left   -Toe taps on foam :Cone on foam x30 fwd and cross midline   -Tandem Laps on foam beam  // bar 5 laps  -Backwards Walking in // bar 5 laps with No UE support     Not Today:   Gait training for 00 minutes: with and AFO    Ladder walks:  - Step to leading with right: 2 laps  - reciprocal: 3 laps  - Lateral walks: 2 laps   - Icky shuffle: 2 laps  - facing ladder in/in out/out: 2 laps each        Patient Education and Home Exercises     Home Exercises Provided and Patient Education Provided   Education provided:   - proper foot wear  - course of therapy, prognosis  - importance of home exercise program - updated 3/21/23    Written Home Exercises Provided: yes. Exercises were reviewed and Magda was able to demonstrate them prior to the end of the session.  Magda demonstrated good  understanding of the education provided. See EMR under Patient Instructions for exercises provided during therapy sessions    ASSESSMENT     Tx provided by Matt Cisneros this visit and not by or cotreat with   Berhane Masters PT " on this day 4/27/2023    Patient tolerates tx well and reports stiffness resolved with activity today. Resumed staggered sit <> stand from seat with airex pad x2. Progressed TKE to 7 plates from 4 plates for increased strength demand.     Magda is a 27 y.o. female referred to outpatient Physical Therapy with a medical diagnosis of Left Bimalleolar ankle fracture and Closed dislocation of left patella presenting to PT at [facility]. She received a left ankle ORIF on 1/6/23 and was non-operative knee immobilizer for left lateral patellar dislocation (reduced at external rotation visit on say of injury 12/29/22.  See UPOC.    Magda Is progressing well towards her goals.   Pt prognosis is Excellent.     Pt will continue to benefit from skilled outpatient physical therapy to address the deficits listed in the problem list box on initial evaluation, provide pt/family education and to maximize pt's level of independence in the home and community environment.     Pt's spiritual, cultural and educational needs considered and pt agreeable to plan of care and goals.     Anticipated barriers to physical therapy: comorbidities, fear and avoidance of left ankle/knee ROM    Goals:   GOALS: Short Term Goals:  4 weeks  1. Report decreased left ankle and knee pain </= 3/10 with standing and walking with AD to increase tolerance for ADLs. MET  2. Increase left ankle AROM by 5 degrees in order to walk with min to no compensation. MET  3. Pt will demo good sitting and standing posture for improved spine and joint alignment for improved biomechanics. MET  4. Pt to tolerate HEP to improve ROM and independence with ADL's. MET  5. Pt will perform standing squat without AD with equal weight bearing on each lower extremity for standing ADLs.  PROGRESSING; NOT MET     Long Term Goals: 8 weeks  1. Report decreased left ankle and knee pain </= 1/10 with walking and stair negotiation without AD to increase tolerance  for increased QoL and improved ADLs. PROGRESSING; NOT MET  2. Patient goal: to get back to walking normally. PROGRESSING; NOT MET  3. Increase strength to >/= 4+/5 for BLE to increase tolerance for ADL and work activities. PROGRESSING; NOT MET  4. Pt will report at </= 35% impaired on ANKLE FOTO score to demo increased functional mobility. PROGRESSING; NOT MET  5. Pt will be able to ambulate community distances and negotiate stairs with minimal ankle pain for increased functional mobility and QoL. PROGRESSING; NOT MET    PLAN     Cont per POC, progress WBAT on LLE without device or ASO  Progress quad, lateral hip and calf strengthening    Rohith Cisneros, PTA

## 2023-05-04 ENCOUNTER — CLINICAL SUPPORT (OUTPATIENT)
Dept: REHABILITATION | Facility: HOSPITAL | Age: 28
End: 2023-05-04
Payer: COMMERCIAL

## 2023-05-04 DIAGNOSIS — R53.1 DECREASED STRENGTH: ICD-10-CM

## 2023-05-04 DIAGNOSIS — M25.672 DECREASED RANGE OF MOTION OF LEFT ANKLE: Primary | ICD-10-CM

## 2023-05-04 PROCEDURE — 97110 THERAPEUTIC EXERCISES: CPT | Mod: PN | Performed by: PHYSICAL THERAPIST

## 2023-05-04 PROCEDURE — 97112 NEUROMUSCULAR REEDUCATION: CPT | Mod: PN | Performed by: PHYSICAL THERAPIST

## 2023-05-04 PROCEDURE — 97530 THERAPEUTIC ACTIVITIES: CPT | Mod: PN | Performed by: PHYSICAL THERAPIST

## 2023-05-04 PROCEDURE — 97140 MANUAL THERAPY 1/> REGIONS: CPT | Mod: PN | Performed by: PHYSICAL THERAPIST

## 2023-05-04 NOTE — PROGRESS NOTES
"  [facility]   Physical Therapy Treatment Note     Name: Magda Lowery  Clinic Number: [id number]    Therapy Diagnosis:   Encounter Diagnoses   Name Primary?    Decreased range of motion of left ankle Yes    Decreased strength        Physician: Liudmila Monge PA-C  Visit Date: 5/4/2023  Physician Orders: PT Eval and Treat  Medical Diagnosis from Referral:   S82.842A (ICD-10-CM) - Bimalleolar ankle fracture, left, closed, initial encounter   S83.005D (ICD-10-CM) - Closed dislocation of left patella, subsequent encounter      Evaluation Date: 2/17/2023  Authorization Period Expiration: 02/06/2024  Plan of Care Expiration: 5/30/23  Progress Note Due: 5/30/23  Visit # / Visits authorized: 21/20 (+2)  FOTO: Performed 5/4/23   PTA visit 0/6    Precautions: Standard, asthma, hypothyroidism, left knee range of motion as tolerated, left ankle WBAT with boot    Time In: 10:00 AM  Time Out: 10:59 AM  Total Billable Time:   56 minutes ( 2 TA, 1 MT, 1 NMR, 1 TE)    SUBJECTIVE     Pt reports: "stuff was moving around" in her ankle when she was pushing off walking in [place], but didn't last long. She didn't wear a brace for the morning at work and did well.    She was compliant with home exercise program.  Response to previous treatment: increased LLE WB   Functional change: walking more    Pain: 0/10  Location: left ankle  Pain: 0/10, when stretching  Location: left knee      OBJECTIVE     Objective Measures updated at progress report unless specified.   See UPOC on 4/18/33 5/4/23  Dorsiflexion:  AROM: +2 (pre manuals) to +6 (post manuals)  PROM: +5 (pre) to +10 (post)    Quad via microfet: right: 32.3 kg left: 20 kg  Left single leg stance: 19"      Treatment     Magda received the treatments listed below:        manual therapy techniques: Joint mobilizations, Myofacial release, and Soft tissue Mobilization were applied to the: ankle for 8 minutes, including:  TC A to P mobs   TC " "distraction  Dorsiflexion PROM      therapeutic exercises to develop strength, endurance, ROM, flexibility, posture, and core stabilization for 8 minutes including testing:    Heel walking: in // bars 3 laps  Side steps on toes: 10x each back and forth    Not today:  NuStep: 8', L3    therapeutic activities to improve functional performance for 32  minutes, including:     Shuttle left heel raise: 1 upper cord and 2 lower cords 3x8  Shuttle left heel raise hold: 1 upper cord and 1 lower cord 4x20" holds with 30" rest breaks  Left Leg Press: seat 4, 6 plates 15x; 7 plates 1x5  Step ups: @1st step 2x10 (up with left, down with right)  Split squats: 2x8 (left in front of right)  RDL (left stance leg): 10x to tap chair    Not today:  Staggered STS (R ahead of L) 2x10       neuromuscular re-education activities to improve: Balance, Coordination, Proprioception, and Posture for 8 minutes. The following activities were included:    -Single leg stance: 3x max (no upper extremity; only count if > 10") [19", 31" 26"]  -Toe taps on foam: x30 fwd and cross midline     Not today:  -dorsiflexion lunge at step: 20x5" holds    Patient Education and Home Exercises     Home Exercises Provided and Patient Education Provided   Education provided:   - proper foot wear  - course of therapy, prognosis  - importance of home exercise program - updated 3/21/23    Written Home Exercises Provided: yes. Exercises were reviewed and Magda was able to demonstrate them prior to the end of the session.  Magda demonstrated good  understanding of the education provided. See EMR under Patient Instructions for exercises provided during therapy sessions    ASSESSMENT     Magda is a 27 y.o. female referred to outpatient Physical Therapy with a medical diagnosis of Left Bimalleolar ankle fracture and Closed dislocation of left patella presenting to PT at [facility]. She received a left ankle ORIF on 1/6/23 and was non-operative " knee immobilizer for left lateral patellar dislocation (reduced at external rotation visit on say of injury 12/29/22.    Progressed strengthening exercises today as seen in bold. Left quad 62% of right at this time. Able to perform left single leg heel raise to >50% height with moderate upper extremity use.     Magda Is progressing well towards her goals.   Pt prognosis is Excellent.     Pt will continue to benefit from skilled outpatient physical therapy to address the deficits listed in the problem list box on initial evaluation, provide pt/family education and to maximize pt's level of independence in the home and community environment.     Pt's spiritual, cultural and educational needs considered and pt agreeable to plan of care and goals.     Anticipated barriers to physical therapy: comorbidities, fear and avoidance of left ankle/knee ROM    Goals:   GOALS: Short Term Goals:  4 weeks  1. Report decreased left ankle and knee pain </= 3/10 with standing and walking with AD to increase tolerance for ADLs. MET  2. Increase left ankle AROM by 5 degrees in order to walk with min to no compensation. MET  3. Pt will demo good sitting and standing posture for improved spine and joint alignment for improved biomechanics. MET  4. Pt to tolerate HEP to improve ROM and independence with ADL's. MET  5. Pt will perform standing squat without AD with equal weight bearing on each lower extremity for standing ADLs.  PROGRESSING; NOT MET     Long Term Goals: 8 weeks  1. Report decreased left ankle and knee pain </= 1/10 with walking and stair negotiation without AD to increase tolerance for increased QoL and improved ADLs. PROGRESSING; NOT MET  2. Patient goal: to get back to walking normally. PROGRESSING; NOT MET  3. Increase strength to >/= 4+/5 for BLE to increase tolerance for ADL and work activities. PROGRESSING; NOT MET  4. Pt will report at </= 35% impaired on ANKLE FOTO score to demo increased functional mobility.  PROGRESSING; NOT MET  5. Pt will be able to ambulate community distances and negotiate stairs with minimal ankle pain for increased functional mobility and QoL. PROGRESSING; NOT MET    PLAN     Cont per POC, progress WBAT on LLE without device or ASO  Progress quad, lateral hip and calf strengthening    Berhane Masters, PT

## 2023-05-09 ENCOUNTER — CLINICAL SUPPORT (OUTPATIENT)
Dept: REHABILITATION | Facility: HOSPITAL | Age: 28
End: 2023-05-09
Payer: OTHER MISCELLANEOUS

## 2023-05-09 DIAGNOSIS — M25.672 DECREASED RANGE OF MOTION OF LEFT ANKLE: Primary | ICD-10-CM

## 2023-05-09 DIAGNOSIS — R53.1 DECREASED STRENGTH: ICD-10-CM

## 2023-05-09 PROCEDURE — 97112 NEUROMUSCULAR REEDUCATION: CPT | Mod: PN,CQ

## 2023-05-09 PROCEDURE — 97110 THERAPEUTIC EXERCISES: CPT | Mod: PN,CQ

## 2023-05-09 PROCEDURE — 97530 THERAPEUTIC ACTIVITIES: CPT | Mod: PN,CQ

## 2023-05-09 NOTE — PROGRESS NOTES
"  [facility]   Physical Therapy Treatment Note     Name: Magda Lowery  Clinic Number: [id number]    Therapy Diagnosis:   Encounter Diagnoses   Name Primary?    Decreased range of motion of left ankle Yes    Decreased strength        Physician: Liudmila Monge PA-C  Visit Date: 5/9/2023  Physician Orders: PT Eval and Treat  Medical Diagnosis from Referral:   S82.842A (ICD-10-CM) - Bimalleolar ankle fracture, left, closed, initial encounter   S83.005D (ICD-10-CM) - Closed dislocation of left patella, subsequent encounter      Evaluation Date: 2/17/2023  Authorization Period Expiration: 02/06/2024  Plan of Care Expiration: 5/30/23  Progress Note Due: 5/30/23  Visit # / Visits authorized: 21/20 (+2)  FOTO: Performed 5/4/23   PTA visit 1/6    Precautions: Standard, asthma, hypothyroidism, left knee range of motion as tolerated, left ankle WBAT with boot    Time In: 8:03 AM  Time Out: 9:00 AM  Total Billable Time:   56 minutes ( 2 TA, 1 MT, 1 NMR, 1 TE)    SUBJECTIVE     Pt reports: Patient reports some pain yesterday after working with out brace, notes pain resolved when she donned brace after lunch. No pain currently.     She was compliant with home exercise program.  Response to previous treatment: increased LLE WB   Functional change: walking more    Pain: 0/10  Location: left ankle  Pain: 0/10, when stretching  Location: left knee      OBJECTIVE     Objective Measures updated at progress report unless specified.   See UPOC on 4/18/33 5/4/23  Dorsiflexion:  AROM: +2 (pre manuals) to +6 (post manuals)  PROM: +5 (pre) to +10 (post)    Quad via microfet: right: 32.3 kg left: 20 kg  Left single leg stance: 19"      Treatment     Magda received the treatments listed below:        manual therapy techniques: Joint mobilizations, Myofacial release, and Soft tissue Mobilization were applied to the: ankle for 0 minutes, including:  TC A to P mobs   TC distraction  Dorsiflexion " "PROM      therapeutic exercises to develop strength, endurance, ROM, flexibility, posture, and core stabilization for 8 minutes including testing:    Heel walking: in // bars 3 laps  Side steps on toes: 10x each back and forth    Not today:  NuStep: 8', L3    therapeutic activities to improve functional performance for 32  minutes, including:     Shuttle left heel raise: 1 upper cord and 2 lower cords 3x8  Shuttle left heel raise hold: 1 upper cord and 2 lower cord 4x20" holds with 30" rest breaks  Left Leg Press: seat 4, 6 plates 15x; 7 plates 1x5  Step ups: @1st step 2x10 (up with left, down with right)  Split squats: 2x8 (left in front of right)  RDL (left stance leg): 10x to tap chair    Not today:  Staggered STS (R ahead of L) 2x10       neuromuscular re-education activities to improve: Balance, Coordination, Proprioception, and Posture for 8 minutes. The following activities were included:    -Single leg stance: 3x max (no upper extremity; only count if > 10") [25" 39" 25"]  -Toe taps on foam: x30 fwd and cross midline (cone son 6 inch step)     Not today:  -dorsiflexion lunge at step: 20x5" holds    Patient Education and Home Exercises     Home Exercises Provided and Patient Education Provided   Education provided:   - proper foot wear  - course of therapy, prognosis  - importance of home exercise program - updated 3/21/23    Written Home Exercises Provided: yes. Exercises were reviewed and Magda was able to demonstrate them prior to the end of the session.  Magda demonstrated good  understanding of the education provided. See EMR under Patient Instructions for exercises provided during therapy sessions    ASSESSMENT     Magda is a 27 y.o. female referred to outpatient Physical Therapy with a medical diagnosis of Left Bimalleolar ankle fracture and Closed dislocation of left patella presenting to PT at [facility]. She received a left ankle ORIF on 1/6/23 and was non-operative knee " immobilizer for left lateral patellar dislocation (reduced at external rotation visit on say of injury 12/29/22.    Progressed Heel raise static hold resistance this day. Progressed cone taps on foam to have cones on 6 inch step to increase height and demand of activity. Patient tolerates tx well with out provocation of pain.     Magda Is progressing well towards her goals.   Pt prognosis is Excellent.     Pt will continue to benefit from skilled outpatient physical therapy to address the deficits listed in the problem list box on initial evaluation, provide pt/family education and to maximize pt's level of independence in the home and community environment.     Pt's spiritual, cultural and educational needs considered and pt agreeable to plan of care and goals.     Anticipated barriers to physical therapy: comorbidities, fear and avoidance of left ankle/knee ROM    Goals:   GOALS: Short Term Goals:  4 weeks  1. Report decreased left ankle and knee pain </= 3/10 with standing and walking with AD to increase tolerance for ADLs. MET  2. Increase left ankle AROM by 5 degrees in order to walk with min to no compensation. MET  3. Pt will demo good sitting and standing posture for improved spine and joint alignment for improved biomechanics. MET  4. Pt to tolerate HEP to improve ROM and independence with ADL's. MET  5. Pt will perform standing squat without AD with equal weight bearing on each lower extremity for standing ADLs.  PROGRESSING; NOT MET     Long Term Goals: 8 weeks  1. Report decreased left ankle and knee pain </= 1/10 with walking and stair negotiation without AD to increase tolerance for increased QoL and improved ADLs. PROGRESSING; NOT MET  2. Patient goal: to get back to walking normally. PROGRESSING; NOT MET  3. Increase strength to >/= 4+/5 for BLE to increase tolerance for ADL and work activities. PROGRESSING; NOT MET  4. Pt will report at </= 35% impaired on ANKLE FOTO score to demo increased  functional mobility. PROGRESSING; NOT MET  5. Pt will be able to ambulate community distances and negotiate stairs with minimal ankle pain for increased functional mobility and QoL. PROGRESSING; NOT MET    PLAN     Cont per POC, progress WBAT on LLE without device or ASO  Progress quad, lateral hip and calf strengthening    Rohith Cisneros, PTA

## 2023-05-11 ENCOUNTER — CLINICAL SUPPORT (OUTPATIENT)
Dept: REHABILITATION | Facility: HOSPITAL | Age: 28
End: 2023-05-11
Payer: COMMERCIAL

## 2023-05-11 DIAGNOSIS — R53.1 DECREASED STRENGTH: ICD-10-CM

## 2023-05-11 DIAGNOSIS — M25.672 DECREASED RANGE OF MOTION OF LEFT ANKLE: Primary | ICD-10-CM

## 2023-05-11 PROCEDURE — 97112 NEUROMUSCULAR REEDUCATION: CPT | Mod: PN | Performed by: PHYSICAL THERAPIST

## 2023-05-11 PROCEDURE — 97530 THERAPEUTIC ACTIVITIES: CPT | Mod: PN | Performed by: PHYSICAL THERAPIST

## 2023-05-11 PROCEDURE — 97110 THERAPEUTIC EXERCISES: CPT | Mod: PN | Performed by: PHYSICAL THERAPIST

## 2023-05-11 PROCEDURE — 97140 MANUAL THERAPY 1/> REGIONS: CPT | Mod: PN | Performed by: PHYSICAL THERAPIST

## 2023-05-11 NOTE — PROGRESS NOTES
"  [facility]   Physical Therapy Treatment Note     Name: Magda Lowery  Clinic Number: [id number]    Therapy Diagnosis:   Encounter Diagnoses   Name Primary?    Decreased range of motion of left ankle Yes    Decreased strength        Physician: Liudmila Monge PA-C  Visit Date: 5/11/2023  Physician Orders: PT Eval and Treat  Medical Diagnosis from Referral:   S82.842A (ICD-10-CM) - Bimalleolar ankle fracture, left, closed, initial encounter   S83.005D (ICD-10-CM) - Closed dislocation of left patella, subsequent encounter      Evaluation Date: 2/17/2023  Authorization Period Expiration: 02/06/2024  Plan of Care Expiration: 5/30/23  Progress Note Due: 5/30/23  Visit # / Visits authorized: 21/20 (+2)  FOTO: Performed 5/4/23   PTA visit 1/6    Precautions: Standard, asthma, hypothyroidism, left knee range of motion as tolerated, left ankle WBAT with boot    Time In: 8:03 AM  Time Out: 9:00 AM  Total Billable Time:   56 minutes ( 2 TA, 1 MT, 1 NMR, 1 TE)    SUBJECTIVE     Pt reports: Patient reports some pain yesterday after working with out brace, notes pain resolved when she donned brace after lunch. No pain currently.     She was compliant with home exercise program.  Response to previous treatment: increased LLE WB   Functional change: walking more    Pain: 0/10  Location: left ankle  Pain: 0/10, when stretching  Location: left knee      OBJECTIVE     Objective Measures updated at progress report unless specified.   See UPOC on 4/18/33 5/4/23  Dorsiflexion:  AROM: +2 (pre manuals) to +6 (post manuals)  PROM: +5 (pre) to +10 (post)    Quad via microfet: right: 32.3 kg left: 20 kg  Left single leg stance: 19"      Treatment     Magda received the treatments listed below:        manual therapy techniques: Joint mobilizations, Myofacial release, and Soft tissue Mobilization were applied to the: ankle for 0 minutes, including:  TC A to P mobs   TC distraction  Dorsiflexion " "PROM      therapeutic exercises to develop strength, endurance, ROM, flexibility, posture, and core stabilization for 8 minutes including testing:    Heel walking: in // bars 3 laps  Side steps on toes: 10x each back and forth    Not today:  NuStep: 8', L3    therapeutic activities to improve functional performance for 32  minutes, including:     Shuttle left heel raise: 1 upper cord and 2 lower cords 3x8  Shuttle left heel raise hold: 1 upper cord and 2 lower cord 4x20" holds with 30" rest breaks  Left Leg Press: seat 4, 6 plates 15x; 7 plates 1x5  Step ups: @1st step 2x10 (up with left, down with right)  Split squats: 2x8 (left in front of right)  RDL (left stance leg): 10x to tap chair    Not today:  Staggered STS (R ahead of L) 2x10       neuromuscular re-education activities to improve: Balance, Coordination, Proprioception, and Posture for 8 minutes. The following activities were included:    -Single leg stance: 3x max (no upper extremity; only count if > 10") [25" 39" 25"]  -Toe taps on foam: x30 fwd and cross midline (cone son 6 inch step)     Not today:  -dorsiflexion lunge at step: 20x5" holds    Patient Education and Home Exercises     Home Exercises Provided and Patient Education Provided   Education provided:   - proper foot wear  - course of therapy, prognosis  - importance of home exercise program - updated 3/21/23    Written Home Exercises Provided: yes. Exercises were reviewed and Magda was able to demonstrate them prior to the end of the session.  Magda demonstrated good  understanding of the education provided. See EMR under Patient Instructions for exercises provided during therapy sessions    ASSESSMENT     Magda is a 27 y.o. female referred to outpatient Physical Therapy with a medical diagnosis of Left Bimalleolar ankle fracture and Closed dislocation of left patella presenting to PT at [facility]. She received a left ankle ORIF on 1/6/23 and was non-operative knee " immobilizer for left lateral patellar dislocation (reduced at external rotation visit on say of injury 12/29/22.    Progressed Heel raise static hold resistance this day. Progressed cone taps on foam to have cones on 6 inch step to increase height and demand of activity. Patient tolerates tx well with out provocation of pain.     Magda Is progressing well towards her goals.   Pt prognosis is Excellent.     Pt will continue to benefit from skilled outpatient physical therapy to address the deficits listed in the problem list box on initial evaluation, provide pt/family education and to maximize pt's level of independence in the home and community environment.     Pt's spiritual, cultural and educational needs considered and pt agreeable to plan of care and goals.     Anticipated barriers to physical therapy: comorbidities, fear and avoidance of left ankle/knee ROM    Goals:   GOALS: Short Term Goals:  4 weeks  1. Report decreased left ankle and knee pain </= 3/10 with standing and walking with AD to increase tolerance for ADLs. MET  2. Increase left ankle AROM by 5 degrees in order to walk with min to no compensation. MET  3. Pt will demo good sitting and standing posture for improved spine and joint alignment for improved biomechanics. MET  4. Pt to tolerate HEP to improve ROM and independence with ADL's. MET  5. Pt will perform standing squat without AD with equal weight bearing on each lower extremity for standing ADLs.  PROGRESSING; NOT MET     Long Term Goals: 8 weeks  1. Report decreased left ankle and knee pain </= 1/10 with walking and stair negotiation without AD to increase tolerance for increased QoL and improved ADLs. PROGRESSING; NOT MET  2. Patient goal: to get back to walking normally. PROGRESSING; NOT MET  3. Increase strength to >/= 4+/5 for BLE to increase tolerance for ADL and work activities. PROGRESSING; NOT MET  4. Pt will report at </= 35% impaired on ANKLE FOTO score to demo increased  functional mobility. PROGRESSING; NOT MET  5. Pt will be able to ambulate community distances and negotiate stairs with minimal ankle pain for increased functional mobility and QoL. PROGRESSING; NOT MET    PLAN     Cont per POC, progress WBAT on LLE without device or ASO  Progress quad, lateral hip and calf strengthening    Rohith Cisneros, PTA

## 2023-05-11 NOTE — PROGRESS NOTES
"  [facility]   Physical Therapy Treatment Note     Name: Magda Lowery  Clinic Number: [id number]    Therapy Diagnosis:   Encounter Diagnoses   Name Primary?    Decreased range of motion of left ankle Yes    Decreased strength        Physician: Liudmila Monge PA-C  Visit Date: 5/11/2023  Physician Orders: PT Eval and Treat  Medical Diagnosis from Referral:   S82.842A (ICD-10-CM) - Bimalleolar ankle fracture, left, closed, initial encounter   S83.005D (ICD-10-CM) - Closed dislocation of left patella, subsequent encounter      Evaluation Date: 2/17/2023  Authorization Period Expiration: 02/06/2024  Plan of Care Expiration: 5/30/23  Progress Note Due: 5/30/23  Visit # / Visits authorized: 23/34 (+2)  FOTO: Performed 5/4/23   PTA visit 0/6    Precautions: Standard, asthma, hypothyroidism, left knee range of motion as tolerated, left ankle WBAT with boot    Time In: 9:00 AM  Time Out: 10:01 AM  Total Billable Time:   58 minutes ( 2 TA, 1 MT, 1 NMR, 1 TE)    SUBJECTIVE     Pt reports: she still does morning without brace and afternoon with it. Today, she has a shorter shift and will try all day without it.    She was compliant with home exercise program.  Response to previous treatment: increased LLE WB   Functional change: walking more    Pain: 0/10  Location: left ankle  Pain: 0/10, when stretching  Location: left knee      OBJECTIVE     Objective Measures updated at progress report unless specified.   See UPOC on 4/18/33 5/11/23  Dorsiflexion:  AROM: +6 (pre manuals) to +10 (post manuals)  PROM: +11 (pre) to +14 (post)    Quad via microfet: right: 32.3 kg left: 25.4 kg (79%)  Left single leg stance: 48"      Treatment     Magda received the treatments listed below:        manual therapy techniques: Joint mobilizations, Myofacial release, and Soft tissue Mobilization were applied to the: ankle for 8 minutes, including:  TC A to P mobs   TC distraction  Dorsiflexion PROM      therapeutic " "exercises to develop strength, endurance, ROM, flexibility, posture, and core stabilization for 16 minutes including testing:    Heel walking: in // bars 3 laps  Side steps on toes: 10x each back and forth  Double leg heel raise holds: 3x30" with 30" rest breaks  Cybex Leg extensions (left): 25 pounds (setting 15) 3x5  Pistol squats: 26" table 10x decent only using wand in right upper extremity (poor eccentric control)      therapeutic activities to improve functional performance for 24 minutes, including:     Left heel raises: 2x10  Double leg quick heel raise: 2x20"  Left Leg Press: seat 4, 6 plates 15x; 7 plates 1x5  Step ups: @1st step 2x10 (up with left, down with right)  Split squats: 2x8 (left in front of right)  RDL (left stance leg): 10x to tap chair  Single leg sit to stand: 26" table 5x with wand in right upper extremity (lower to 25" next)    Not today:  Staggered STS (R ahead of L) 2x10       neuromuscular re-education activities to improve: Balance, Coordination, Proprioception, and Posture for 10 minutes. The following activities were included:    -Single leg stance: 3x max (no upper extremity; only count if > 10") [20", 27" and 48"]  -Toe taps on foam: x30 fwd and cross midline (cone son 6 inch step)   - high hurdles: 2 laps step to and 2 laps reciprocal (6 hurdles)    Not today:  -dorsiflexion lunge at step: 20x5" holds    Patient Education and Home Exercises     Home Exercises Provided and Patient Education Provided   Education provided:   - proper foot wear  - course of therapy, prognosis  - importance of home exercise program - updated 3/21/23    Written Home Exercises Provided: yes. Exercises were reviewed and Magda was able to demonstrate them prior to the end of the session.  Magda demonstrated good  understanding of the education provided. See EMR under Patient Instructions for exercises provided during therapy sessions    ASSESSMENT     Magda is a 27 y.o. female referred to outpatient " Physical Therapy with a medical diagnosis of Left Bimalleolar ankle fracture and Closed dislocation of left patella presenting to PT at [facility]. She received a left ankle ORIF on 1/6/23 and was non-operative knee immobilizer for left lateral patellar dislocation (reduced at external rotation visit on say of injury 12/29/22.    Dorsiflexion pre and post manuals continue to improve. Single leg stance more than doubled over last week. Progressed calf strengthening to body weight instead of shuttle. Poor eccentric control with minimal pistol squat today.    Magda Is progressing well towards her goals.   Pt prognosis is Excellent.     Pt will continue to benefit from skilled outpatient physical therapy to address the deficits listed in the problem list box on initial evaluation, provide pt/family education and to maximize pt's level of independence in the home and community environment.     Pt's spiritual, cultural and educational needs considered and pt agreeable to plan of care and goals.     Anticipated barriers to physical therapy: comorbidities, fear and avoidance of left ankle/knee ROM    Goals:   GOALS: Short Term Goals:  4 weeks  1. Report decreased left ankle and knee pain </= 3/10 with standing and walking with AD to increase tolerance for ADLs. MET  2. Increase left ankle AROM by 5 degrees in order to walk with min to no compensation. MET  3. Pt will demo good sitting and standing posture for improved spine and joint alignment for improved biomechanics. MET  4. Pt to tolerate HEP to improve ROM and independence with ADL's. MET  5. Pt will perform standing squat without AD with equal weight bearing on each lower extremity for standing ADLs.  PROGRESSING; NOT MET     Long Term Goals: 8 weeks  1. Report decreased left ankle and knee pain </= 1/10 with walking and stair negotiation without AD to increase tolerance for increased QoL and improved ADLs. PROGRESSING; NOT MET  2. Patient  goal: to get back to walking normally. PROGRESSING; NOT MET  3. Increase strength to >/= 4+/5 for BLE to increase tolerance for ADL and work activities. PROGRESSING; NOT MET  4. Pt will report at </= 35% impaired on ANKLE FOTO score to demo increased functional mobility. PROGRESSING; NOT MET  5. Pt will be able to ambulate community distances and negotiate stairs with minimal ankle pain for increased functional mobility and QoL. PROGRESSING; NOT MET    PLAN     Cont per POC, progress WBAT on LLE without device or ASO  Progress quad, lateral hip and calf strengthening    Berhane Masters, PT

## 2023-05-15 ENCOUNTER — HOSPITAL ENCOUNTER (OUTPATIENT)
Dept: RADIOLOGY | Facility: HOSPITAL | Age: 28
Discharge: HOME | End: 2023-05-15
Attending: PHYSICIAN ASSISTANT
Payer: COMMERCIAL

## 2023-05-15 ENCOUNTER — OFFICE VISIT (OUTPATIENT)
Dept: ORTHOPEDICS | Facility: CLINIC | Age: 28
End: 2023-05-15
Payer: COMMERCIAL

## 2023-05-15 VITALS — WEIGHT: 229.06 LBS | BODY MASS INDEX: 34.72 KG/M2 | HEIGHT: 68 IN

## 2023-05-15 DIAGNOSIS — S82.842A BIMALLEOLAR ANKLE FRACTURE, LEFT, CLOSED, INITIAL ENCOUNTER: ICD-10-CM

## 2023-05-15 DIAGNOSIS — S82.842A BIMALLEOLAR ANKLE FRACTURE, LEFT, CLOSED, INITIAL ENCOUNTER: Primary | ICD-10-CM

## 2023-05-15 PROCEDURE — 99999 PR PBB SHADOW E&M-EST. PATIENT-LVL III: CPT | Mod: PBBFAC,,, | Performed by: PHYSICIAN ASSISTANT

## 2023-05-15 PROCEDURE — 1159F PR MEDICATION LIST DOCUMENTED IN MEDICAL RECORD: ICD-10-PCS | Mod: CPTII,S$GLB,, | Performed by: PHYSICIAN ASSISTANT

## 2023-05-15 PROCEDURE — 99999 PR PBB SHADOW E&M-EST. PATIENT-LVL III: ICD-10-PCS | Mod: PBBFAC,,, | Performed by: PHYSICIAN ASSISTANT

## 2023-05-15 PROCEDURE — 3008F PR BODY MASS INDEX (BMI) DOCUMENTED: ICD-10-PCS | Mod: CPTII,S$GLB,, | Performed by: PHYSICIAN ASSISTANT

## 2023-05-15 PROCEDURE — 1159F MED LIST DOCD IN RCRD: CPT | Mod: CPTII,S$GLB,, | Performed by: PHYSICIAN ASSISTANT

## 2023-05-15 PROCEDURE — 99024 PR POST-OP FOLLOW-UP VISIT: ICD-10-PCS | Mod: S$GLB,,, | Performed by: PHYSICIAN ASSISTANT

## 2023-05-15 PROCEDURE — 73610 X-RAY EXAM OF ANKLE: CPT | Mod: 26,LT,, | Performed by: RADIOLOGY

## 2023-05-15 PROCEDURE — 73610 X-RAY EXAM OF ANKLE: CPT | Mod: TC,LT

## 2023-05-15 PROCEDURE — 73610 XR ANKLE COMPLETE 3 VIEW LEFT: ICD-10-PCS | Mod: 26,LT,, | Performed by: RADIOLOGY

## 2023-05-15 PROCEDURE — 99024 POSTOP FOLLOW-UP VISIT: CPT | Mod: S$GLB,,, | Performed by: PHYSICIAN ASSISTANT

## 2023-05-15 PROCEDURE — 3008F BODY MASS INDEX DOCD: CPT | Mod: CPTII,S$GLB,, | Performed by: PHYSICIAN ASSISTANT

## 2023-05-15 NOTE — PROGRESS NOTES
Principal Orthopedic Problem:  Left bimalleolar ankle fracture (lateral and posterior malleoli)                          Status post left patellar dislocation     Relevant Medical History: according to chart    PMHX:  healthy     HPI: Ms. Lowery is  a 27 year old female who presented to the Ed on 12/30/22 with left knee and ankle pain after falling.   RADS:   KNEE: dislocated left patella    Reduced at bedside, knee brace    ANKLE:  minimally displaced lateral malleolar fracture, minimally displaced posterior malleolar fracture, and subtle medial clear space widening.  Normal alignment of the tibiotalar joint.  Stress views of the ankle obtained with bedside fluoroscopy showed widening of the medial gutter with external rotation stress.    01/06/23: :   Open reduction internal fixation left bimalleolar ankle fracture - 56514    Examination under anesthesia left knee    Ms. Lowery is here today for a post-operative visit    Interval History:  she reports that she is doing ok.   she is at home . she is  participating in PT/OT.   Pain is controled.  she is  taking pain medication.    she denies fever, chills, and sweats .     Complications since time of surgery: none     Physical exam:    Patient arrives to exam room: wheelchair boot clean  .  Patient is  accompanied family member    Ankle  Incision is clean, dry and intact.    Healing well no signs of breakdown or infection.   Knee: 0-110    RADS: All pertinent images were reviewed by myself:   ANKLE: Postoperative changes of internal fixation of the distal tibia and fibula identified.  The position alignment is satisfactory and unchanged as compared to the previous study    Assessment:  Post-op visit ( 10 weeks)    Plan:  Current care, treatment plan, precautions, activity level/ modifications, limitations, rehabilitation exercises and proposed future treatment were discussed with the patient. We discussed the need to monitor for changes in symptoms and  condition and report them to the physician.  Discussed importance of compliance with all appointments and follow up examinations.     WOUND CARE ORDERS  - You may use vitamin E (break the capsule open), aloe, scar cream (mederma) or hand lotion to rub the scar.  You must rub across the scar and in the line of the scar.  The goal is to make the scar not tender and make the scar not adhere (stick to) the tissues below the scar.  There may be some mild discomfort with this initially.          ACTIVITY:   - light, slowly increase no high impact   ANKLE:    - ROMAT   - weight bearing as tolerated      -PT/OT, [facility]  , Patient is responsible to establish and continue care      PAIN MEDICATION:   - Multimodal pain control  - Pain medication: refill was not needed, will call if needed   - Pain medication refill policy provided to patient for review, yes.    - Patient was informed a multi-modal approach is used to treat their pain. With the goal to get off of narcotic pain medication and discontinue as soon as possible.   - ice and elevation to reduce pain and swelling     DVT PROPHYLAXIS:   - ASA 81 mg bid: post op regime completed     FOLLOW UP:   - Patient will follow up in the clinic in 12 weeks.  - X-ray of her knee 2 view AP lat and ankle NWB OOB is needed.          If there are any questions prior to scheduled follow up, the patient was instructed to contact the office

## 2023-05-16 ENCOUNTER — CLINICAL SUPPORT (OUTPATIENT)
Dept: REHABILITATION | Facility: HOSPITAL | Age: 28
End: 2023-05-16
Payer: COMMERCIAL

## 2023-05-16 DIAGNOSIS — M25.672 DECREASED RANGE OF MOTION OF LEFT ANKLE: Primary | ICD-10-CM

## 2023-05-16 DIAGNOSIS — R53.1 DECREASED STRENGTH: ICD-10-CM

## 2023-05-16 PROCEDURE — 97112 NEUROMUSCULAR REEDUCATION: CPT | Mod: PN

## 2023-05-16 PROCEDURE — 97110 THERAPEUTIC EXERCISES: CPT | Mod: PN

## 2023-05-16 PROCEDURE — 97140 MANUAL THERAPY 1/> REGIONS: CPT | Mod: PN

## 2023-05-16 PROCEDURE — 97530 THERAPEUTIC ACTIVITIES: CPT | Mod: PN

## 2023-05-16 NOTE — PROGRESS NOTES
"  [facility]   Physical Therapy Treatment Note     Name: Magda Lowery  Clinic Number: [id number]    Therapy Diagnosis:   Encounter Diagnoses   Name Primary?    Decreased range of motion of left ankle Yes    Decreased strength      Physician: Liudmila Monge PA-C  Visit Date: 5/16/2023  Physician Orders: PT Eval and Treat  Medical Diagnosis from Referral:   S82.842A (ICD-10-CM) - Bimalleolar ankle fracture, left, closed, initial encounter   S83.005D (ICD-10-CM) - Closed dislocation of left patella, subsequent encounter      Evaluation Date: 2/17/2023  Authorization Period Expiration: 02/06/2024  Plan of Care Expiration: 5/30/23  Progress Note Due: 5/30/23  Visit # / Visits authorized: 23/34 (+2)  FOTO: 5/10   PTA visit 0/6    Precautions: Standard, asthma, hypothyroidism, left knee range of motion as tolerated, left ankle WBAT with boot    Time In: 10:06 AM  Time Out: 11:00 AM  Total Billable Time:  54 minutes ( 1 TA, 1 MT, 1 NMR, 1 TE)    SUBJECTIVE     Pt reports: using ankle brace less and trying to do more exercises at work when she can.    She was compliant with home exercise program.  Response to previous treatment: increased LLE WB   Functional change: walking more    Pain: 0/10  Location: left ankle  Pain: 0/10, when stretching  Location: left knee      OBJECTIVE     Objective Measures updated at progress report unless specified.   See UPOC on 4/18/33 5/11/23  Dorsiflexion:  AROM: +6 (pre manuals) to +10 (post manuals)  PROM: +11 (pre) to +14 (post)    Quad via microfet: right: 32.3 kg left: 25.4 kg (79%)  Left single leg stance: 48"    Treatment     Magda received the treatments listed below:        manual therapy techniques: Joint mobilizations, Myofacial release, and Soft tissue Mobilization were applied to the: ankle for 10 minutes, including:  TC A to P mobs   TC distraction  Dorsiflexion PROM    therapeutic exercises to develop strength, endurance, ROM, flexibility, " "posture, and core stabilization for 15 minutes including testing:    Heel walking: in // bars 3 laps  Side steps on toes: 10x each back and forth  Double leg heel raise holds: 3x15, 3x30" with 30" rest breaks on shuttle with 2.5 cords    Not today:  Cybex Leg extensions (left): 25 pounds (setting 15) 3x5  Pistol squats: 26" table 10x decent only using wand in right upper extremity (poor eccentric control)    therapeutic activities to improve functional performance for 19 minutes, including:     Left heel raises: 2x10  Double leg quick heel raise: 2x20"  Left Leg Press: seat 4, 6 plates 15x; 7 plates 1x5  Step ups: @1st step 2x10 (up with left, down with right)    Not today:  Split squats: 2x8 (left in front of right)  RDL (left stance leg): 10x to tap chair  Single leg sit to stand: 26" table 5x with wand in right upper extremity (lower to 25" next)    neuromuscular re-education activities to improve: Balance, Coordination, Proprioception, and Posture for 10 minutes. The following activities were included:  -Dorsiflexion lunge at step with strap at ankle: 20x5" holds  -Single leg stance: 3x max (no upper extremity; only count if > 10")    Not today:  -Toe taps on foam: x30 fwd and cross midline (cone son 6 inch step)   - high hurdles: 2 laps step to and 2 laps reciprocal (6 hurdles)    Patient Education and Home Exercises     Home Exercises Provided and Patient Education Provided   Education provided:   - proper foot wear  - course of therapy, prognosis  - importance of home exercise program - updated 3/21/23    Written Home Exercises Provided: yes. Exercises were reviewed and Magda was able to demonstrate them prior to the end of the session.  Magda demonstrated good  understanding of the education provided. See EMR under Patient Instructions for exercises provided during therapy sessions    ASSESSMENT     Magda is a 27 y.o. female referred to outpatient Physical Therapy with a medical diagnosis of Left Bimalleolar " ankle fracture and Closed dislocation of left patella presenting to PT at [facility]. She received a left ankle ORIF on 1/6/23 and was non-operative knee immobilizer for left lateral patellar dislocation (reduced at external rotation visit on say of injury 12/29/22.  Pt did well and soreness appears to be solely joint related at this time. She has the most pain with the self mobilization with band at the step with pain at the anterior talocrural joint. Fair (-) calf strength and reported overall ankle soreness. She requires continued strengthening for her left ankle and aggressive ankle range of motion activities. Mild fear and avoidance tendencies and she was encouraged to perform more squat and ankle bending functional activities from day to day.    Magda Is progressing well towards her goals.   Pt prognosis is Excellent.     Pt will continue to benefit from skilled outpatient physical therapy to address the deficits listed in the problem list box on initial evaluation, provide pt/family education and to maximize pt's level of independence in the home and community environment.     Pt's spiritual, cultural and educational needs considered and pt agreeable to plan of care and goals.     Anticipated barriers to physical therapy: comorbidities, fear and avoidance of left ankle/knee ROM    Goals:   GOALS: Short Term Goals:  4 weeks  1. Report decreased left ankle and knee pain </= 3/10 with standing and walking with AD to increase tolerance for ADLs. MET  2. Increase left ankle AROM by 5 degrees in order to walk with min to no compensation. MET  3. Pt will demo good sitting and standing posture for improved spine and joint alignment for improved biomechanics. MET  4. Pt to tolerate HEP to improve ROM and independence with ADL's. MET  5. Pt will perform standing squat without AD with equal weight bearing on each lower extremity for standing ADLs.  PROGRESSING; NOT MET     Long Term Goals: 8  weeks  1. Report decreased left ankle and knee pain </= 1/10 with walking and stair negotiation without AD to increase tolerance for increased QoL and improved ADLs. PROGRESSING; NOT MET  2. Patient goal: to get back to walking normally. PROGRESSING; NOT MET  3. Increase strength to >/= 4+/5 for BLE to increase tolerance for ADL and work activities. PROGRESSING; NOT MET  4. Pt will report at </= 35% impaired on ANKLE FOTO score to demo increased functional mobility. PROGRESSING; NOT MET  5. Pt will be able to ambulate community distances and negotiate stairs with minimal ankle pain for increased functional mobility and QoL. PROGRESSING; NOT MET    PLAN     Cont per POC, progress WBAT on LLE without device or ASO  Progress quad, lateral hip and calf strengthening    Yimi Medina, PT

## 2023-05-18 ENCOUNTER — CLINICAL SUPPORT (OUTPATIENT)
Dept: REHABILITATION | Facility: HOSPITAL | Age: 28
End: 2023-05-18
Payer: COMMERCIAL

## 2023-05-18 DIAGNOSIS — R53.1 DECREASED STRENGTH: ICD-10-CM

## 2023-05-18 DIAGNOSIS — M25.672 DECREASED RANGE OF MOTION OF LEFT ANKLE: Primary | ICD-10-CM

## 2023-05-18 PROCEDURE — 97110 THERAPEUTIC EXERCISES: CPT | Mod: PN

## 2023-05-18 PROCEDURE — 97530 THERAPEUTIC ACTIVITIES: CPT | Mod: PN

## 2023-05-18 NOTE — PROGRESS NOTES
"  [facility]   Physical Therapy Treatment Note     Name: Magda Lowery  Clinic Number: [id number]    Therapy Diagnosis:   Encounter Diagnoses   Name Primary?    Decreased range of motion of left ankle Yes    Decreased strength      Physician: Liudmila Monge PA-C  Visit Date: 5/18/2023  Physician Orders: PT Eval and Treat  Medical Diagnosis from Referral:   S82.842A (ICD-10-CM) - Bimalleolar ankle fracture, left, closed, initial encounter   S83.005D (ICD-10-CM) - Closed dislocation of left patella, subsequent encounter      Evaluation Date: 2/17/2023  Authorization Period Expiration: 02/06/2024  Plan of Care Expiration: 5/30/23  Progress Note Due: 5/30/23  Visit # / Visits authorized: 24/34 (+2)  FOTO: 6/10   PTA visit 0/6    Precautions: Standard, asthma, hypothyroidism, left knee range of motion as tolerated, left ankle WBAT with boot    Time In: 9:05 AM  Time Out: 10:00 AM  Total Billable Time:  55 minutes (2 TA, 2 TE)    SUBJECTIVE     Pt reports: rarely using ankle brace now. Ankle was sore on the lateral side of her left ankle after last visit; still a little sore. Reports she is trying to use her left foot more, but avoids any squatting on LLE such as to pick items off the ground at work.    She was compliant with home exercise program.  Response to previous treatment: increased LLE WB   Functional change: walking more    Pain: 3/10  Location: left ankle     OBJECTIVE     Objective Measures updated at progress report unless specified.   See UPOC on 4/18/33 5/11/23  Dorsiflexion:  AROM: +6 (pre manuals) to +10 (post manuals)  PROM: +11 (pre) to +14 (post)    Quad via microfet: right: 32.3 kg left: 25.4 kg (79%)  Left single leg stance: 48"    Treatment     Magda received the treatments listed below:        manual therapy techniques: Joint mobilizations, Myofacial release, and Soft tissue Mobilization were applied to the: ankle for 00 minutes, including:  TC A to P mobs   TC " "distraction  Dorsiflexion PROM    therapeutic exercises to develop strength, endurance, ROM, flexibility, posture, and core stabilization for 30 minutes including testing:    Standing BAPs board (L2)   A/P: 30x   INV/EV: 30x   Circles CW/CCW: 30x each  Heel walking: in // bars 4 laps  Fitter calf stretch: 3x30'' double leg   Suitcase squats: 10x, 20# kettle bell in left hand  Double leg heel raise holds: 3x15, 3x30" with 30" rest breaks on shuttle with 2.5 cords  Cybex Leg extensions (double leg): 25 pounds (setting 15) 3x5  Lateral step downs: 3x10, 6 inch step - not today    therapeutic activities to improve functional performance for 25 minutes, including:    Dorsiflexion lunge at step with strap at ankle: 20x5" holds - not today  Marching on toes (constant heel raise): 40x Bilateral   Lateral bounding (no hopping): 40x  Step ups with contralateral march: 2x15 left, 6 inch step  RDL (left stance leg): 2x10 to tap chair  Double leg quick heel raise: 2x20" - not today  Left Leg Press: seat 4, 6 plates 2x20 with wedge under forefoot (6.7 plates next)    Not today:  Split squats: 2x8 (left in front of right)  Single leg sit to stand: 26" table 5x with wand in right upper extremity (lower to 25" next)    neuromuscular re-education activities to improve: Balance, Coordination, Proprioception, and Posture for 00 minutes. The following activities were included:  Single leg stance: 5x10'' holds left  Tandem walking on foam: next    Patient Education and Home Exercises     Home Exercises Provided and Patient Education Provided   Education provided:   - proper foot wear  - course of therapy, prognosis  - importance of home exercise program - updated 3/21/23    Written Home Exercises Provided: yes. Exercises were reviewed and Magda was able to demonstrate them prior to the end of the session.  Magda demonstrated good  understanding of the education provided. See EMR under Patient Instructions for exercises provided during " therapy sessions    ASSESSMENT     Magda is a 27 y.o. female referred to outpatient Physical Therapy with a medical diagnosis of Left Bimalleolar ankle fracture and Closed dislocation of left patella presenting to PT at [facility]. She received a left ankle ORIF on 1/6/23 and was non-operative knee immobilizer for left lateral patellar dislocation (reduced at external rotation visit on say of injury 12/29/22.  Pt did very well today and was very fatigued after session. Mild left anterior ankle pain for deep squat activities, and we addressed issues with avoiding deep squatting on LLE at work and around the house. She needs to be more frequently squatting down on her LLE for ankle mobility and LLE strengthening. Will progress single leg balance next visit and calf strengthening next visit.     Magda Is progressing well towards her goals.   Pt prognosis is Excellent.       Pt's spiritual, cultural and educational needs considered and pt agreeable to plan of care and goals.     Anticipated barriers to physical therapy: comorbidities, fear and avoidance of left ankle/knee ROM    Goals:   GOALS: Short Term Goals:  4 weeks  1. Report decreased left ankle and knee pain </= 3/10 with standing and walking with AD to increase tolerance for ADLs. MET  2. Increase left ankle AROM by 5 degrees in order to walk with min to no compensation. MET  3. Pt will demo good sitting and standing posture for improved spine and joint alignment for improved biomechanics. MET  4. Pt to tolerate HEP to improve ROM and independence with ADL's. MET  5. Pt will perform standing squat without AD with equal weight bearing on each lower extremity for standing ADLs.  PROGRESSING; NOT MET     Long Term Goals: 8 weeks  1. Report decreased left ankle and knee pain </= 1/10 with walking and stair negotiation without AD to increase tolerance for increased QoL and improved ADLs. PROGRESSING; NOT MET  2. Patient goal: to get back  to walking normally. PROGRESSING; NOT MET  3. Increase strength to >/= 4+/5 for BLE to increase tolerance for ADL and work activities. PROGRESSING; NOT MET  4. Pt will report at </= 35% impaired on ANKLE FOTO score to demo increased functional mobility. PROGRESSING; NOT MET  5. Pt will be able to ambulate community distances and negotiate stairs with minimal ankle pain for increased functional mobility and QoL. PROGRESSING; NOT MET    PLAN     Cont per POC, progress WBAT on LLE without device or ASO  Progress quad, lateral hip and calf strengthening    Yimi Medina, PT

## 2023-05-22 ENCOUNTER — PATIENT MESSAGE (OUTPATIENT)
Dept: REHABILITATION | Facility: HOSPITAL | Age: 28
End: 2023-05-22
Payer: COMMERCIAL

## 2023-05-25 ENCOUNTER — CLINICAL SUPPORT (OUTPATIENT)
Dept: REHABILITATION | Facility: HOSPITAL | Age: 28
End: 2023-05-25
Payer: COMMERCIAL

## 2023-05-25 DIAGNOSIS — M25.672 DECREASED RANGE OF MOTION OF LEFT ANKLE: Primary | ICD-10-CM

## 2023-05-25 DIAGNOSIS — R53.1 DECREASED STRENGTH: ICD-10-CM

## 2023-05-25 PROCEDURE — 97530 THERAPEUTIC ACTIVITIES: CPT | Mod: PN,CQ

## 2023-05-25 PROCEDURE — 97110 THERAPEUTIC EXERCISES: CPT | Mod: PN,CQ

## 2023-05-25 NOTE — PROGRESS NOTES
"  [facility]   Physical Therapy Treatment Note     Name: Magda Lowery  Clinic Number: [id number]    Therapy Diagnosis:   Encounter Diagnoses   Name Primary?    Decreased range of motion of left ankle Yes    Decreased strength      Physician: Liudmila Monge PA-C  Visit Date: 5/25/2023  Physician Orders: PT Eval and Treat  Medical Diagnosis from Referral:   S82.842A (ICD-10-CM) - Bimalleolar ankle fracture, left, closed, initial encounter   S83.005D (ICD-10-CM) - Closed dislocation of left patella, subsequent encounter      Evaluation Date: 2/17/2023  Authorization Period Expiration: 02/06/2024  Plan of Care Expiration: 5/30/23  Progress Note Due: 5/30/23  Visit # / Visits authorized: 26/34 (+2)  FOTO: 6/10   PTA visit 1/5    Precautions: Standard, asthma, hypothyroidism, left knee range of motion as tolerated, left ankle WBAT with boot    Time In: 10:05 AM  Time Out: 11:00 AM  Total Billable Time:  55 minutes (2 TA, 2 TE)    SUBJECTIVE     Pt reports: Trying to be more mindful of squatting properly when picking up things off the floor.     She was compliant with home exercise program.  Response to previous treatment: increased LLE WB   Functional change: walking more    Pain: 3/10  Location: left ankle     OBJECTIVE     Objective Measures updated at progress report unless specified.   See UPOC on 4/18/33 5/11/23  Dorsiflexion:  AROM: +6 (pre manuals) to +10 (post manuals)  PROM: +11 (pre) to +14 (post)    Quad via microfet: right: 32.3 kg left: 25.4 kg (79%)  Left single leg stance: 48"    Treatment     Magda received the treatments listed below:        manual therapy techniques: Joint mobilizations, Myofacial release, and Soft tissue Mobilization were applied to the: ankle for 00 minutes, including:  TC A to P mobs   TC distraction  Dorsiflexion PROM    therapeutic exercises to develop strength, endurance, ROM, flexibility, posture, and core stabilization for 30 minutes including " "testing:    Standing BAPs board (L2)   A/P: 30x   INV/EV: 30x   Circles CW/CCW: 30x each  Heel walking: in // bars 4 laps  Fitter calf stretch: 3x30'' double leg   Suitcase squats: 10x, 20# kettle bell in left hand  Cybex Leg extensions (double leg): 25 pounds (setting 15) 3x5 p! In left knee  Lateral step downs: 3x10, 6 inch step   Double leg heel raise 2 x 10; modified sL heel raise at stairs 2 x 10    Not today  Double leg heel raise holds: 3x15, 3x30" with 30" rest breaks on shuttle with 2.5 cords (not today)    therapeutic activities to improve functional performance for 25 minutes, including:    Marching on toes (constant heel raise): 40x Bilateral   Lateral bounding (no hopping): 40x  Step ups with contralateral march: 2x15 left, 6 inch step  Step down 4" 2 x 8  RDL (left stance leg): 2x10 to tap chair  Left Leg Press: seat 4, 6.5 plates 3x10 with wedge under forefoot   Inclined squat off ramp at // bars 2 x 10    Not today:  Split squats: 2x8 (left in front of right)  Single leg sit to stand: 26" table 5x with wand in right upper extremity (lower to 25" next)  Double leg quick heel raise: 2x20" - not today  Dorsiflexion lunge at step with strap at ankle: 20x5" holds - not today    neuromuscular re-education activities to improve: Balance, Coordination, Proprioception, and Posture for 00 minutes. The following activities were included:  Single leg stance: 5x10'' holds left  Tandem walking on foam: next    Patient Education and Home Exercises     Home Exercises Provided and Patient Education Provided   Education provided:   - proper foot wear  - course of therapy, prognosis  - importance of home exercise program - updated 3/21/23    Written Home Exercises Provided: yes. Exercises were reviewed and Magda was able to demonstrate them prior to the end of the session.  Magda demonstrated good  understanding of the education provided. See EMR under Patient Instructions for exercises provided during therapy " sessions    ASSESSMENT     Magda is a 27 y.o. female referred to outpatient Physical Therapy with a medical diagnosis of Left Bimalleolar ankle fracture and Closed dislocation of left patella presenting to PT at [facility]. She received a left ankle ORIF on 1/6/23 and was non-operative knee immobilizer for left lateral patellar dislocation (reduced at external rotation visit on say of injury 12/29/22.  Continued to focus session on calf and hip strengthening along with addressing deep squatting on LLE. Reports she is trying to be more aware of squatting mechanics at work and at home. Overall tolerated fairly well although some limitations with therex due to left knee pain. Will monitor patient response to session and progress single leg balance next visit and calf strengthening next visit.     Magda Is progressing well towards her goals.   Pt prognosis is Excellent.       Pt's spiritual, cultural and educational needs considered and pt agreeable to plan of care and goals.     Anticipated barriers to physical therapy: comorbidities, fear and avoidance of left ankle/knee ROM    Goals:   GOALS: Short Term Goals:  4 weeks  1. Report decreased left ankle and knee pain </= 3/10 with standing and walking with AD to increase tolerance for ADLs. MET  2. Increase left ankle AROM by 5 degrees in order to walk with min to no compensation. MET  3. Pt will demo good sitting and standing posture for improved spine and joint alignment for improved biomechanics. MET  4. Pt to tolerate HEP to improve ROM and independence with ADL's. MET  5. Pt will perform standing squat without AD with equal weight bearing on each lower extremity for standing ADLs.  PROGRESSING; NOT MET     Long Term Goals: 8 weeks  1. Report decreased left ankle and knee pain </= 1/10 with walking and stair negotiation without AD to increase tolerance for increased QoL and improved ADLs. PROGRESSING; NOT MET  2. Patient goal: to get back  to walking normally. PROGRESSING; NOT MET  3. Increase strength to >/= 4+/5 for BLE to increase tolerance for ADL and work activities. PROGRESSING; NOT MET  4. Pt will report at </= 35% impaired on ANKLE FOTO score to demo increased functional mobility. PROGRESSING; NOT MET  5. Pt will be able to ambulate community distances and negotiate stairs with minimal ankle pain for increased functional mobility and QoL. PROGRESSING; NOT MET    PLAN     Cont per POC, progress WBAT on LLE without device or ASO  Progress quad, lateral hip and calf strengthening    Destiney Nicholson, PTA

## 2023-05-30 ENCOUNTER — CLINICAL SUPPORT (OUTPATIENT)
Dept: REHABILITATION | Facility: HOSPITAL | Age: 28
End: 2023-05-30
Payer: COMMERCIAL

## 2023-05-30 DIAGNOSIS — R53.1 DECREASED STRENGTH: ICD-10-CM

## 2023-05-30 DIAGNOSIS — M25.672 DECREASED RANGE OF MOTION OF LEFT ANKLE: Primary | ICD-10-CM

## 2023-05-30 PROCEDURE — 97110 THERAPEUTIC EXERCISES: CPT | Mod: PN

## 2023-05-30 PROCEDURE — 97530 THERAPEUTIC ACTIVITIES: CPT | Mod: PN

## 2023-05-30 NOTE — PROGRESS NOTES
"  [facility]   Physical Therapy Treatment Note     Name: Magda Lowery  Clinic Number: [id number]    Therapy Diagnosis:   Encounter Diagnoses   Name Primary?    Decreased range of motion of left ankle Yes    Decreased strength      Physician: Liudmila Monge PA-C  Visit Date: 5/30/2023  Physician Orders: PT Eval and Treat  Medical Diagnosis from Referral:   S82.842A (ICD-10-CM) - Bimalleolar ankle fracture, left, closed, initial encounter   S83.005D (ICD-10-CM) - Closed dislocation of left patella, subsequent encounter      Evaluation Date: 2/17/2023  Authorization Period Expiration: 02/06/2024  Plan of Care Expiration: 5/30/23  Progress Note Due: 5/30/23  Visit # / Visits authorized: 26/34 (+2)  FOTO: 8/10   PTA visit 0/5    Precautions: Standard, asthma, hypothyroidism, left knee range of motion as tolerated, left ankle WBAT with boot    Time In: 10:05 AM  Time Out: 11:00 AM  Total Billable Time:  55 minutes (2 TA, 2 TE)    SUBJECTIVE     Pt reports: doing more at work and trying to use her ankle more.    She was compliant with home exercise program.  Response to previous treatment: increased LLE WB   Functional change: walking more    Pain: 3/10  Location: left ankle     OBJECTIVE     Objective Measures updated at progress report unless specified.   See UPOC on 4/18/33 5/11/23  Dorsiflexion:  AROM: +6 (pre manuals) to +10 (post manuals)  PROM: +11 (pre) to +14 (post)    Quad via microfet: right: 32.3 kg left: 25.4 kg (79%)  Left single leg stance: 48"    Treatment     Magda received the treatments listed below:        manual therapy techniques: Joint mobilizations, Myofacial release, and Soft tissue Mobilization were applied to the: ankle for 00 minutes, including:  TC A to P mobs   TC distraction  Dorsiflexion PROM    therapeutic exercises to develop strength, endurance, ROM, flexibility, posture, and core stabilization for 30 minutes including testing:    Standing BAPs board " "(L2)   A/P: 30x   INV/EV: 30x   Circles CW/CCW: 30x each  Heel walking: 3 laps at ballet bars  Toe walking: 3 laps at ballet bars  Fitter calf stretch: 3x30'' double leg   Suitcase squats: 3x8, 20# kettle bell in left hand  Cybex Leg extensions (double leg): 25 pounds (setting 15) 3x5 p! In left knee - not today  Lateral step downs: 2x12, 6 inch step   Double leg heel raise 2 x 10; modified sL heel raise at stairs 2 x 10 - d/c next for shuttle    therapeutic activities to improve functional performance for 25 minutes, including:    Lateral bounding (no hopping): 40x  Step ups with contralateral march: 2x15 left, 6 inch step  Step down 4" 2 x 8  RDL (left stance leg): 2x10 to tap chair  Left Leg Press: seat 4, 6.5 plates 3x10 with wedge under forefoot   Inclined squat off ramp at // bars 2 x 10    neuromuscular re-education activities to improve: Balance, Coordination, Proprioception, and Posture for 00 minutes. The following activities were included:  Single leg stance: 5x10'' holds left  Tandem walking on foam: 3 laps    Patient Education and Home Exercises     Home Exercises Provided and Patient Education Provided   Education provided:   - proper foot wear  - course of therapy, prognosis  - importance of home exercise program - updated 3/21/23    Written Home Exercises Provided: yes. Exercises were reviewed and Magda was able to demonstrate them prior to the end of the session.  Magda demonstrated good  understanding of the education provided. See EMR under Patient Instructions for exercises provided during therapy sessions    ASSESSMENT     Magda is a 27 y.o. female referred to outpatient Physical Therapy with a medical diagnosis of Left Bimalleolar ankle fracture and Closed dislocation of left patella presenting to PT at [facility]. She received a left ankle ORIF on 1/6/23 and was non-operative knee immobilizer for left lateral patellar dislocation (reduced at external rotation visit " on say of injury 12/29/22.  Continued to focus session on calf and hip strengthening along with addressing deep squatting on LLE. Pt doing well and very fatigued after session. Make activities as functional as possible for endurance and overall LLE strengthening. Pain is minimal, and main complaint is left knee rather than ankle for open chain knee extension activities. Fear and avoidance is improving overall.     Magda Is progressing well towards her goals.   Pt prognosis is Excellent.       Pt's spiritual, cultural and educational needs considered and pt agreeable to plan of care and goals.     Anticipated barriers to physical therapy: comorbidities, fear and avoidance of left ankle/knee ROM    Goals:   GOALS: Short Term Goals:  4 weeks  1. Report decreased left ankle and knee pain </= 3/10 with standing and walking with AD to increase tolerance for ADLs. MET  2. Increase left ankle AROM by 5 degrees in order to walk with min to no compensation. MET  3. Pt will demo good sitting and standing posture for improved spine and joint alignment for improved biomechanics. MET  4. Pt to tolerate HEP to improve ROM and independence with ADL's. MET  5. Pt will perform standing squat without AD with equal weight bearing on each lower extremity for standing ADLs.  PROGRESSING; NOT MET     Long Term Goals: 8 weeks  1. Report decreased left ankle and knee pain </= 1/10 with walking and stair negotiation without AD to increase tolerance for increased QoL and improved ADLs. PROGRESSING; NOT MET  2. Patient goal: to get back to walking normally. PROGRESSING; NOT MET  3. Increase strength to >/= 4+/5 for BLE to increase tolerance for ADL and work activities. PROGRESSING; NOT MET  4. Pt will report at </= 35% impaired on ANKLE FOTO score to demo increased functional mobility. PROGRESSING; NOT MET  5. Pt will be able to ambulate community distances and negotiate stairs with minimal ankle pain for increased functional mobility and QoL.  PROGRESSING; NOT MET    PLAN     Cont per POC.   Progress quad, lateral hip and calf strengthening    Yimi Medina, PT                oral

## 2023-05-31 ENCOUNTER — DOCUMENTATION ONLY (OUTPATIENT)
Dept: REHABILITATION | Facility: HOSPITAL | Age: 28
End: 2023-05-31
Payer: COMMERCIAL

## 2023-06-01 ENCOUNTER — CLINICAL SUPPORT (OUTPATIENT)
Dept: REHABILITATION | Facility: HOSPITAL | Age: 28
End: 2023-06-01
Payer: COMMERCIAL

## 2023-06-01 DIAGNOSIS — R53.1 DECREASED STRENGTH: ICD-10-CM

## 2023-06-01 DIAGNOSIS — M25.672 DECREASED RANGE OF MOTION OF LEFT ANKLE: Primary | ICD-10-CM

## 2023-06-01 PROCEDURE — 97110 THERAPEUTIC EXERCISES: CPT | Mod: PN

## 2023-06-01 PROCEDURE — 97530 THERAPEUTIC ACTIVITIES: CPT | Mod: PN

## 2023-06-01 PROCEDURE — 97112 NEUROMUSCULAR REEDUCATION: CPT | Mod: PN

## 2023-06-01 NOTE — PROGRESS NOTES
"  [facility]   Physical Therapy Treatment Note     Name: Magda Lowery  Clinic Number: [id number]    Therapy Diagnosis:   Encounter Diagnoses   Name Primary?    Decreased range of motion of left ankle Yes    Decreased strength      Physician: Liudmila Monge PA-C  Visit Date: 6/1/2023  Physician Orders: PT Eval and Treat  Medical Diagnosis from Referral:   S82.842A (ICD-10-CM) - Bimalleolar ankle fracture, left, closed, initial encounter   S83.005D (ICD-10-CM) - Closed dislocation of left patella, subsequent encounter      Evaluation Date: 2/17/2023  Authorization Period Expiration: 02/06/2024  Plan of Care Expiration: 5/30/23  Progress Note Due: 5/30/23  Visit # / Visits authorized: 27/34 (+2)  FOTO: 9/10 - Next   PTA visit 0/5    Precautions: Standard, asthma, hypothyroidism, left knee range of motion as tolerated, left ankle WBAT with boot    Time In: 10:05 AM  Time Out: 11:00 AM  Total Billable Time:  55 minutes (1 NMR, 1 TA, 2 TE)    SUBJECTIVE     Pt reports: muscle soreness after therapy but ankle doing well.     She was compliant with home exercise program.  Response to previous treatment: increased LLE WB   Functional change: walking more    Pain: 2/10  Location: left ankle     OBJECTIVE     Objective Measures updated at progress report unless specified.   See UPOC on 4/18/33 5/11/23  Dorsiflexion:  AROM: +6 (pre manuals) to +10 (post manuals)  PROM: +11 (pre) to +14 (post)    Quad via microfet: right: 32.3 kg left: 25.4 kg (79%)  Left single leg stance: 48"    Treatment     Magda received the treatments listed below:      manual therapy techniques: Joint mobilizations, Myofacial release, and Soft tissue Mobilization were applied to the: ankle for 00 minutes, including:  TC A to P mobs   TC distraction  Dorsiflexion PROM    therapeutic exercises to develop strength, endurance, ROM, flexibility, posture, and core stabilization for 30 minutes including testing:    Standing BAPs " "board (L2)   A/P: 30x   INV/EV: 30x   Circles CW/CCW: 30x each  Toe walkin laps at ballet bars  Fitter calf stretch: 3x30'' double leg   Suitcase squats: 2x10, 20# kettle bell in left hand  Cybex Leg extensions (double leg): 25 pounds (setting 15) 3x5 p! In left knee - not today  Lateral step downs: 2x12, 6 inch step   Double leg heel raise 2 x 10; modified sL heel raise at stairs 2 x 10 - d/c next for shuttle    therapeutic activities to improve functional performance for 15 minutes, including:    Lateral bounding (no hopping): 40x  Step ups at 12 inch step: next  Step down 4" 2 x 8  RDL (left stance leg): 2x10 to tap chair, 10x with 20# kettle bell (moderate cueing)  Lateral crab walkin lap of 40' with red theraband   Suitcase walk: 40'x2 with 30# kettle bell in left hand    Not today, resume next:  Left Leg Press: seat 4, 6.5 plates 3x10 with wedge under forefoot   Inclined squat off ramp at // bars 2 x 10    neuromuscular re-education activities to improve: Balance, Coordination, Proprioception, and Posture for 10 minutes. The following activities were included:  Single leg stance: 5x30'' holds left on airex  Single leg stance with ball toss on airex: 1 minute  Tandem walking on foam: 3 laps    Patient Education and Home Exercises     Home Exercises Provided and Patient Education Provided   Education provided:   - proper foot wear  - course of therapy, prognosis  - importance of home exercise program - updated 3/21/23    Written Home Exercises Provided: yes. Exercises were reviewed and Magda was able to demonstrate them prior to the end of the session.  Magda demonstrated good  understanding of the education provided. See EMR under Patient Instructions for exercises provided during therapy sessions    ASSESSMENT     Magda is a 27 y.o. female referred to outpatient Physical Therapy with a medical diagnosis of Left Bimalleolar ankle fracture and Closed dislocation of left patella presenting to PT at " [facility]. She received a left ankle ORIF on 1/6/23 and was non-operative knee immobilizer for left lateral patellar dislocation (reduced at external rotation visit on say of injury 12/29/22.  Muscle fatigue is largest barrier at this time and fear/avoidance with activities improving each visit - does require some coaxing for some activities. She is doing well and should complete therapy this month with twice a week visits. Continue single leg strength/balance training. She is doing more at work and she was encouraged to use her left ankle/leg more.     Magda Is progressing well towards her goals.   Pt prognosis is Excellent.       Pt's spiritual, cultural and educational needs considered and pt agreeable to plan of care and goals.     Anticipated barriers to physical therapy: comorbidities, fear and avoidance of left ankle/knee ROM    Goals:   GOALS: Short Term Goals:  4 weeks  1. Report decreased left ankle and knee pain </= 3/10 with standing and walking with AD to increase tolerance for ADLs. MET  2. Increase left ankle AROM by 5 degrees in order to walk with min to no compensation. MET  3. Pt will demo good sitting and standing posture for improved spine and joint alignment for improved biomechanics. MET  4. Pt to tolerate HEP to improve ROM and independence with ADL's. MET  5. Pt will perform standing squat without AD with equal weight bearing on each lower extremity for standing ADLs.  PROGRESSING; NOT MET     Long Term Goals: 8 weeks  1. Report decreased left ankle and knee pain </= 1/10 with walking and stair negotiation without AD to increase tolerance for increased QoL and improved ADLs. PROGRESSING; NOT MET  2. Patient goal: to get back to walking normally. PROGRESSING; NOT MET  3. Increase strength to >/= 4+/5 for BLE to increase tolerance for ADL and work activities. PROGRESSING; NOT MET  4. Pt will report at </= 35% impaired on ANKLE FOTO score to demo increased functional  mobility. PROGRESSING; NOT MET  5. Pt will be able to ambulate community distances and negotiate stairs with minimal ankle pain for increased functional mobility and QoL. PROGRESSING; NOT MET    PLAN     Cont per POC.   Progress quad, lateral hip and calf strengthening    Yimi Medina, PT

## 2023-06-01 NOTE — PLAN OF CARE
"[facility]  Physical Therapy Plan of Care Note     Name: Magda Lowery  Clinic Number: [id number]    Therapy Diagnosis:   Encounter Diagnoses   Name Primary?    Decreased range of motion of left ankle Yes    Decreased strength      Physician: Liudmila Monge PA-C    Visit Date: 6/1/2023    Visit Date: 6/1/2023  Physician Orders: PT Eval and Treat  Medical Diagnosis from Referral:   S82.842A (ICD-10-CM) - Bimalleolar ankle fracture, left, closed, initial encounter   S83.005D (ICD-10-CM) - Closed dislocation of left patella, subsequent encounter   Evaluation Date: 2/17/2023  Plan of Care Expiration: 5/30/23  Visit # / Visits authorized: 29/ 34    Precautions: Standard, asthma, hypothyroidism, left knee range of motion as tolerated, left ankle WBAT with boot  Functional Level Prior to Evaluation:  left ankle limitations in range of motion and strength, ankle pain    SUBJECTIVE     Update: muscle soreness after therapy but ankle doing well.     OBJECTIVE     Update:   Single leg stance max: 35 seconds; ASO off  Gait: no device; left trendelenberg, mild decreased stance time, Bilateral toe out, mild genu valgum    Dorsiflexion:  AROM: 4  PROM: 5     Quad via microfet: right: 32.3 kg left: 25.4 kg (79%)  Left single leg stance: 48"    ASSESSMENT     Update: Magda is a 27 y.o. female referred to outpatient Physical Therapy with a medical diagnosis of Left Bimalleolar ankle fracture and Closed dislocation of left patella presenting to PT at [facility]. She received a left ankle ORIF on 1/6/23 and was non-operative knee immobilizer for left lateral patellar dislocation (reduced at external rotation visit on say of injury 12/29/22). Pt progressing well and met all short term goals at this time. She does still have mild fear and avoidance despite continuous education and LLE loading activities in session. She is improving every week, and lacking mild left ankle " dorsiflexion. Mainly requires general LLE strengthening and increased general activity outside of the clinic. She would benefit from continued skilled PT for full return to all activities and increased quality of life.       Previous Short Term Goals Status:     GOALS: Short Term Goals:  4 weeks  1. Report decreased left ankle and knee pain </= 3/10 with standing and walking with AD to increase tolerance for ADLs. MET  2. Increase left ankle AROM by 5 degrees in order to walk with min to no compensation. MET  3. Pt will demo good sitting and standing posture for improved spine and joint alignment for improved biomechanics. MET  4. Pt to tolerate HEP to improve ROM and independence with ADL's. MET  5. Pt will perform standing squat without AD with equal weight bearing on each lower extremity for standing ADLs. MET     Long Term Goals: 8 weeks  1. Report decreased left ankle and knee pain </= 1/10 with walking and stair negotiation without AD to increase tolerance for increased QoL and improved ADLs. PROGRESSING; NOT MET  2. Patient goal: to get back to walking normally. PROGRESSING; NOT MET  3. Increase strength to >/= 4+/5 for BLE to increase tolerance for ADL and work activities. PROGRESSING; NOT MET  4. Pt will report at </= 35% impaired on ANKLE FOTO score to demo increased functional mobility. PROGRESSING; NOT MET  5. Pt will be able to ambulate community distances and negotiate stairs with minimal ankle pain for increased functional mobility and QoL. PROGRESSING; NOT MET  Long Term Goal Status: continue per initial plan of care.  Reasons for Recertification of Therapy:   UPOC    GOALS  GOALS: Short Term Goals:  4 weeks  1. Report decreased left ankle and knee pain </= 3/10 with standing and walking with AD to increase tolerance for ADLs. MET  2. Increase left ankle AROM by 5 degrees in order to walk with min to no compensation. MET  3. Pt will demo good sitting and standing posture for improved spine and joint  alignment for improved biomechanics. MET  4. Pt to tolerate HEP to improve ROM and independence with ADL's. MET  5. Pt will perform standing squat without AD with equal weight bearing on each lower extremity for standing ADLs.  MET     Long Term Goals: 8 weeks  1. Report decreased left ankle and knee pain </= 1/10 with walking and stair negotiation without AD to increase tolerance for increased QoL and improved ADLs. PROGRESSING; NOT MET  2. Patient goal: to get back to walking normally. PROGRESSING; NOT MET  3. Increase strength to >/= 4+/5 for BLE to increase tolerance for ADL and work activities. PROGRESSING; NOT MET  4. Pt will report at </= 35% impaired on ANKLE FOTO score to demo increased functional mobility. PROGRESSING; NOT MET  5. Pt will be able to ambulate community distances and negotiate stairs with minimal ankle pain for increased functional mobility and QoL. - MET    PLAN     Updated Certification Period: 5/30/23 to 6/30/23   Recommended Treatment Plan: 2 times per week for 4 weeks:  Cervical/Lumbar Traction, Electrical Stimulation IFC, Gait Training, Manual Therapy, Moist Heat/ Ice, Neuromuscular Re-ed, Orthotic Management and Training, Patient Education, Self Care, Therapeutic Activities, and Therapeutic Exercise  Other Recommendations: None     Yimi Medina, PT

## 2023-06-06 ENCOUNTER — CLINICAL SUPPORT (OUTPATIENT)
Dept: REHABILITATION | Facility: HOSPITAL | Age: 28
End: 2023-06-06
Payer: COMMERCIAL

## 2023-06-06 DIAGNOSIS — M25.672 DECREASED RANGE OF MOTION OF LEFT ANKLE: Primary | ICD-10-CM

## 2023-06-06 DIAGNOSIS — R53.1 DECREASED STRENGTH: ICD-10-CM

## 2023-06-06 PROCEDURE — 97112 NEUROMUSCULAR REEDUCATION: CPT | Mod: PN,CQ

## 2023-06-06 PROCEDURE — 97530 THERAPEUTIC ACTIVITIES: CPT | Mod: PN,CQ

## 2023-06-06 PROCEDURE — 97110 THERAPEUTIC EXERCISES: CPT | Mod: PN,CQ

## 2023-06-06 NOTE — PROGRESS NOTES
"  [facility]   Physical Therapy Treatment Note     Name: Magda Lowery  Clinic Number: [id number]    Therapy Diagnosis:   Encounter Diagnoses   Name Primary?    Decreased range of motion of left ankle Yes    Decreased strength      Physician: Liudmila Monge PA-C  Visit Date: 6/6/2023  Physician Orders: PT Eval and Treat  Medical Diagnosis from Referral:   S82.842A (ICD-10-CM) - Bimalleolar ankle fracture, left, closed, initial encounter   S83.005D (ICD-10-CM) - Closed dislocation of left patella, subsequent encounter      Evaluation Date: 2/17/2023  Authorization Period Expiration: 02/06/2024  Plan of Care Expiration: 5/30/23  Progress Note Due: 5/30/23  Visit # / Visits authorized: 27/34 (+2)  FOTO: 9/10 - Next   PTA visit 0/5    Precautions: Standard, asthma, hypothyroidism, left knee range of motion as tolerated, left ankle WBAT with boot    Time In: 900 AM  Time Out: 11:00 AM  Total Billable Time:  55 minutes (1 NMR, 1 TA, 2 TE)    SUBJECTIVE     Pt reports: Patient reports soreness and pain yesterday with exercises (heel raise and step ups) 5/10 at worst yesterday. Today patient reports marked improvement 2/10 pain.     She was compliant with home exercise program.  Response to previous treatment: increased LLE WB   Functional change: walking more    Pain: 2/10  Location: left ankle     OBJECTIVE     Objective Measures updated at progress report unless specified.   See UPOC on 4/18/33 5/11/23  Dorsiflexion:  AROM: +6 (pre manuals) to +10 (post manuals)  PROM: +11 (pre) to +14 (post)    Quad via microfet: right: 32.3 kg left: 25.4 kg (79%)  Left single leg stance: 48"    Treatment     Magda received the treatments listed below:      manual therapy techniques: Joint mobilizations, Myofacial release, and Soft tissue Mobilization were applied to the: ankle for 00 minutes, including:  TC A to P mobs   TC distraction  Dorsiflexion PROM    therapeutic exercises to develop strength, " "endurance, ROM, flexibility, posture, and core stabilization for 08  minutes including testing:    Double leg heel raise 3 x 10 ([place] toe)   Ankle DF on step with ankle IR 10x10   Resume next visit.   Standing BAPs board (L2)   A/P: 30x   INV/EV: 30x   Circles CW/CCW: 30x each  Toe walkin laps at ballet Skylabs  Fitter calf stretch: 3x30'' double leg   Suitcase squats: 2x10, 20# kettle bell in left hand  Cybex Leg extensions (double leg): 25 pounds (setting 15) 3x5 p! In left knee - not today  Lateral step downs: 2x12, 6 inch step    modified sL heel raise at stairs 2 x 10 - d/c next for shuttle    therapeutic activities to improve functional performance for 37 minutes, including:    Lateral Walk with GTB around forefoot 3 laps x40 feet   Step ups with contralateral hip flexion at steps 2x10   Step down (heel tap) 4" 2 x 8 (emphasis on L CKC IR and neutral ankle rotation)   RDL (left stance leg): 2x10 to tap chair, 10x with 20# kettle bell (moderate cueing)  Suitcase walk: 40'x2 with 30# kettle bell in left hand    Not today, resume next:  Lateral bounding (no hopping): 40x  Lateral crab walkin lap of 40' with red theraband   Left Leg Press: seat 4, 6.5 plates 3x10 with wedge under forefoot   Inclined squat off ramp at // bars 2 x 10    neuromuscular re-education activities to improve: Balance, Coordination, Proprioception, and Posture for 08minutes. The following activities were included:  Single leg stance: 3x30'' holds left on airex  Single leg stance with ball toss on Firm: 30"x4   Tandem walking on foam: 3 laps NT    Patient Education and Home Exercises     Home Exercises Provided and Patient Education Provided   Education provided:   - proper foot wear  - course of therapy, prognosis  - importance of home exercise program - updated 3/21/23    Written Home Exercises Provided: yes. Exercises were reviewed and Magda was able to demonstrate them prior to the end of the session.  Magda demonstrated good  " understanding of the education provided. See EMR under Patient Instructions for exercises provided during therapy sessions    ASSESSMENT     Magda is a 27 y.o. female referred to outpatient Physical Therapy with a medical diagnosis of Left Bimalleolar ankle fracture and Closed dislocation of left patella presenting to PT at [facility]. She received a left ankle ORIF on 1/6/23 and was non-operative knee immobilizer for left lateral patellar dislocation (reduced at external rotation visit on say of injury 12/29/22.  Muscle fatigue is largest barrier at this time and fear/avoidance with activities improving each visit . Tx emphasis on controlled eversion of ankle with frequent VC for ground through big toe and through VC/TC for CKC IR L hip. After CKC IF L hip activities patient demo improved standing balance and decreased tension in lateral ankle.     Magda Is progressing well towards her goals.   Pt prognosis is Excellent.       Pt's spiritual, cultural and educational needs considered and pt agreeable to plan of care and goals.     Anticipated barriers to physical therapy: comorbidities, fear and avoidance of left ankle/knee ROM    Goals:   GOALS: Short Term Goals:  4 weeks  1. Report decreased left ankle and knee pain </= 3/10 with standing and walking with AD to increase tolerance for ADLs. MET  2. Increase left ankle AROM by 5 degrees in order to walk with min to no compensation. MET  3. Pt will demo good sitting and standing posture for improved spine and joint alignment for improved biomechanics. MET  4. Pt to tolerate HEP to improve ROM and independence with ADL's. MET  5. Pt will perform standing squat without AD with equal weight bearing on each lower extremity for standing ADLs.  PROGRESSING; NOT MET     Long Term Goals: 8 weeks  1. Report decreased left ankle and knee pain </= 1/10 with walking and stair negotiation without AD to increase tolerance for increased QoL and improved  ADLs. PROGRESSING; NOT MET  2. Patient goal: to get back to walking normally. PROGRESSING; NOT MET  3. Increase strength to >/= 4+/5 for BLE to increase tolerance for ADL and work activities. PROGRESSING; NOT MET  4. Pt will report at </= 35% impaired on ANKLE FOTO score to demo increased functional mobility. PROGRESSING; NOT MET  5. Pt will be able to ambulate community distances and negotiate stairs with minimal ankle pain for increased functional mobility and QoL. PROGRESSING; NOT MET    PLAN     Cont per POC.   Progress quad, lateral hip and calf strengthening    Rohith Cisneros, PTA

## 2023-06-08 ENCOUNTER — CLINICAL SUPPORT (OUTPATIENT)
Dept: REHABILITATION | Facility: HOSPITAL | Age: 28
End: 2023-06-08
Payer: COMMERCIAL

## 2023-06-08 DIAGNOSIS — R53.1 DECREASED STRENGTH: ICD-10-CM

## 2023-06-08 DIAGNOSIS — M25.672 DECREASED RANGE OF MOTION OF LEFT ANKLE: Primary | ICD-10-CM

## 2023-06-08 PROCEDURE — 97112 NEUROMUSCULAR REEDUCATION: CPT | Mod: PN,CQ

## 2023-06-08 PROCEDURE — 97530 THERAPEUTIC ACTIVITIES: CPT | Mod: PN,CQ

## 2023-06-08 PROCEDURE — 97110 THERAPEUTIC EXERCISES: CPT | Mod: PN,CQ

## 2023-06-08 NOTE — PROGRESS NOTES
"  [facility]   Physical Therapy Treatment Note     Name: Magda Lowery  Clinic Number: [id number]    Therapy Diagnosis:   Encounter Diagnoses   Name Primary?    Decreased range of motion of left ankle Yes    Decreased strength      Physician: Liudmila Monge PA-C  Visit Date: 6/8/2023  Physician Orders: PT Eval and Treat  Medical Diagnosis from Referral:   S82.842A (ICD-10-CM) - Bimalleolar ankle fracture, left, closed, initial encounter   S83.005D (ICD-10-CM) - Closed dislocation of left patella, subsequent encounter      Evaluation Date: 2/17/2023  Authorization Period Expiration: 02/06/2024  Plan of Care Expiration: 5/30/23  Progress Note Due: 5/30/23  Visit # / Visits authorized: 27/34 (+2)  FOTO: 10/10 - Next   PTA visit 1/5    Precautions: Standard, asthma, hypothyroidism, left knee range of motion as tolerated, left ankle WBAT with boot    Time In: 900 AM  Time Out: 11:00 AM  Total Billable Time:  55 minutes (1 NMR, 1 TA, 2 TE)    SUBJECTIVE     Pt reports: Patient reports feeling good after previous tx with out increased pain at work.     She was compliant with home exercise program.  Response to previous treatment: increased LLE WB   Functional change: walking more    Pain: 2/10  Location: left ankle     OBJECTIVE     Objective Measures updated at progress report unless specified.   See UPOC on 4/18/33 5/11/23  Dorsiflexion:  AROM: +6 (pre manuals) to +10 (post manuals)  PROM: +11 (pre) to +14 (post)    Quad via microfet: right: 32.3 kg left: 25.4 kg (79%)  Left single leg stance: 48"    Treatment     Magda received the treatments listed below:      manual therapy techniques: Joint mobilizations, Myofacial release, and Soft tissue Mobilization were applied to the: ankle for 00 minutes, including:  TC A to P mobs   TC distraction  Dorsiflexion PROM    therapeutic exercises to develop strength, endurance, ROM, flexibility, posture, and core stabilization for 08  minutes including " "testing:    Supine TB Eversion 2x12x5" L ankle    Double leg heel raise 3 x 10 ([place] toe)   Ankle DF on step with ankle IR 10x10   Resume next visit.   Standing BAPs board (L2)   A/P: 30x   INV/EV: 30x   Circles CW/CCW: 30x each  Toe walkin laps at ballet bars  [place] calf stretch: 3x30'' double leg   Suitcase squats: 2x10, 20# kettle bell in left hand  Cybex Leg extensions (double leg): 25 pounds (setting 15) 3x5 p! In left knee - not today  Lateral step downs: 2x12, 6 inch step    modified sL heel raise at stairs 2 x 10 - d/c next for shuttle    therapeutic activities to improve functional performance for 30 minutes, including:    Lateral Walk with GTB around forefoot 3 laps x40 feet   Step down (heel tap)  2 x 8 (emphasis on L CKC IR and neutral ankle rotation)   RDL (left stance leg): 2x10 to tap chair, (Resume next visit) 10x with 20# kettle bell (moderate cueing)      Not today, resume next:  Step ups with contralateral hip flexion at steps 2x10   Suitcase walk: 40'x2 with 30# kettle bell in left hand  Lateral bounding (no hopping): 40x  Lateral crab walkin lap of 40' with red theraband   Left Leg Press: seat 4, 6.5 plates 3x10 with wedge under forefoot   Inclined squat off ramp at // bars 2 x 10    neuromuscular re-education activities to improve: Balance, Coordination, Proprioception, and Posture for 20 minutes. The following activities were included:  Fitter First Balance board:   Taps: S/S and A/P x20ea   Static Balance: 2x1' ea  Single leg stance: 3x30'' holds left on airex  Single leg stance with ball toss on Firm: 30"x4   Tandem walking on foam: 3 laps NT    Patient Education and Home Exercises     Home Exercises Provided and Patient Education Provided   Education provided:   - proper foot wear  - course of therapy, prognosis  - importance of home exercise program - updated 3/21/23    Written Home Exercises Provided: yes. Exercises were reviewed and Magda was able to demonstrate them prior to " the end of the session.  Magda demonstrated good  understanding of the education provided. See EMR under Patient Instructions for exercises provided during therapy sessions    ASSESSMENT     Magda is a 27 y.o. female referred to outpatient Physical Therapy with a medical diagnosis of Left Bimalleolar ankle fracture and Closed dislocation of left patella presenting to PT at [facility]. She received a left ankle ORIF on 1/6/23 and was non-operative knee immobilizer for left lateral patellar dislocation (reduced at external rotation visit on say of injury 12/29/22.  Muscle fatigue is largest barrier at this time and fear/avoidance with activities improving each visit . Tx emphasis  continued on ankle eversion strength and closed chain activity.Patient with improved single leg stance stability after eversion therex and closed chain abduction emphasis in lateral step down.     Magda Is progressing well towards her goals.   Pt prognosis is Excellent.       Pt's spiritual, cultural and educational needs considered and pt agreeable to plan of care and goals.     Anticipated barriers to physical therapy: comorbidities, fear and avoidance of left ankle/knee ROM    Goals:   GOALS: Short Term Goals:  4 weeks  1. Report decreased left ankle and knee pain </= 3/10 with standing and walking with AD to increase tolerance for ADLs. MET  2. Increase left ankle AROM by 5 degrees in order to walk with min to no compensation. MET  3. Pt will demo good sitting and standing posture for improved spine and joint alignment for improved biomechanics. MET  4. Pt to tolerate HEP to improve ROM and independence with ADL's. MET  5. Pt will perform standing squat without AD with equal weight bearing on each lower extremity for standing ADLs.  PROGRESSING; NOT MET     Long Term Goals: 8 weeks  1. Report decreased left ankle and knee pain </= 1/10 with walking and stair negotiation without AD to increase tolerance for  increased QoL and improved ADLs. PROGRESSING; NOT MET  2. Patient goal: to get back to walking normally. PROGRESSING; NOT MET  3. Increase strength to >/= 4+/5 for BLE to increase tolerance for ADL and work activities. PROGRESSING; NOT MET  4. Pt will report at </= 35% impaired on ANKLE FOTO score to demo increased functional mobility. PROGRESSING; NOT MET  5. Pt will be able to ambulate community distances and negotiate stairs with minimal ankle pain for increased functional mobility and QoL. PROGRESSING; NOT MET    PLAN     Cont per POC.   Progress quad, lateral hip and calf strengthening    Rohith Cisneros, PTA

## 2023-06-13 ENCOUNTER — CLINICAL SUPPORT (OUTPATIENT)
Dept: REHABILITATION | Facility: HOSPITAL | Age: 28
End: 2023-06-13
Payer: COMMERCIAL

## 2023-06-13 DIAGNOSIS — M25.672 DECREASED RANGE OF MOTION OF LEFT ANKLE: Primary | ICD-10-CM

## 2023-06-13 DIAGNOSIS — R53.1 DECREASED STRENGTH: ICD-10-CM

## 2023-06-13 PROCEDURE — 97112 NEUROMUSCULAR REEDUCATION: CPT | Mod: PN,CQ

## 2023-06-13 PROCEDURE — 97530 THERAPEUTIC ACTIVITIES: CPT | Mod: PN,CQ

## 2023-06-13 NOTE — PROGRESS NOTES
"  [facility]   Physical Therapy Treatment Note     Name: Magda Lowery  Clinic Number: [id number]    Therapy Diagnosis:   Encounter Diagnoses   Name Primary?    Decreased range of motion of left ankle Yes    Decreased strength      Physician: Liudmila Monge PA-C  Visit Date: 6/13/2023  Physician Orders: PT Eval and Treat  Medical Diagnosis from Referral:   S82.842A (ICD-10-CM) - Bimalleolar ankle fracture, left, closed, initial encounter   S83.005D (ICD-10-CM) - Closed dislocation of left patella, subsequent encounter      Evaluation Date: 2/17/2023  Authorization Period Expiration: 02/06/2024  Plan of Care Expiration: 5/30/23  Progress Note Due: 5/30/23  Visit # / Visits authorized: 27/34 (+2)  FOTO: 10/10 - Next   PTA visit 1/5    Precautions: Standard, asthma, hypothyroidism, left knee range of motion as tolerated, left ankle WBAT with boot    Time In: 1000 AM  Time Out: 1056 AM  Total Billable Time:  53 minutes (1 NMR, 3 TA, )    SUBJECTIVE     Pt reports: No pain today. Reports no issues at work.     She was compliant with home exercise program.  Response to previous treatment: increased LLE WB   Functional change: walking more    Pain: 2/10  Location: left ankle     OBJECTIVE     Objective Measures updated at progress report unless specified.   See UPOC on 4/18/33 5/11/23  Dorsiflexion:  AROM: +6 (pre manuals) to +10 (post manuals)  PROM: +11 (pre) to +14 (post)    Quad via microfet: right: 32.3 kg left: 25.4 kg (79%)  Left single leg stance: 48"    Treatment     Magda received the treatments listed below:      manual therapy techniques: Joint mobilizations, Myofacial release, and Soft tissue Mobilization were applied to the: ankle for 00 minutes, including:  TC A to P mobs   TC distraction  Dorsiflexion PROM    therapeutic exercises to develop strength, endurance, ROM, flexibility, posture, and core stabilization for 00 minutes including testing:    Supine TB Eversion 2x12x5" L " "ankle NT    Double leg heel raise 3 x 10 ([place] toe)   Ankle DF on step with ankle IR 10x10   Resume next visit.   Standing BAPs board (L2)   A/P: 30x   INV/EV: 30x   Circles CW/CCW: 30x each  Toe walkin laps at ballet bars  Fitter calf stretch: 3x30'' double leg   Suitcase squats: 2x10, 20# kettle bell in left hand  Cybex Leg extensions (double leg): 25 pounds (setting 15) 3x5 p! In left knee - not today  Lateral step downs: 2x12, 6 inch step    modified sL heel raise at stairs 2 x 10 - d/c next for shuttle    therapeutic activities to improve functional performance for 38minutes, including:    Lateral Walk with GTB around forefoot 2 laps, BluTB x1 lap (x40 feet )  Step down (heel tap)  2 x 10 (emphasis on L CKC IR and neutral ankle rotation)   RDL (left stance leg): 2x10 to Kettle bell on 4" step, (Resume next visit) 10x with 20# kettle bell on 4" step (moderate cueing)  Suitcase walk: 40'x2 with 30# kettle bell in left hand  Left Leg Press: seat 4, 7 plates 3x10     Not today, resume next:  Step ups with contralateral hip flexion at steps 2x10   Lateral bounding (no hopping): 40x  Lateral crab walkin lap of 40' with red theraband     Inclined squat off ramp at // bars 2 x 10    neuromuscular re-education activities to improve: Balance, Coordination, Proprioception, and Posture for 15 minutes. The following activities were included:  Fitter First Balance board:   Taps: S/S and A/P x20ea   Static Balance: 2x1' ea  Single leg stance: 3x30'' holds left on airex  Single leg stance with ball toss on Firm: 1'x3  Tandem walking on foam: 3 laps NT    Patient Education and Home Exercises     Home Exercises Provided and Patient Education Provided   Education provided:   - proper foot wear  - course of therapy, prognosis  - importance of home exercise program - updated 3/21/23    Written Home Exercises Provided: yes. Exercises were reviewed and Magda was able to demonstrate them prior to the end of the session.  " "Magda demonstrated good  understanding of the education provided. See EMR under Patient Instructions for exercises provided during therapy sessions    ASSESSMENT     Magda is a 27 y.o. female referred to outpatient Physical Therapy with a medical diagnosis of Left Bimalleolar ankle fracture and Closed dislocation of left patella presenting to PT at [facility]. She received a left ankle ORIF on 1/6/23 and was non-operative knee immobilizer for left lateral patellar dislocation (reduced at external rotation visit on say of injury 12/29/22.  Muscle fatigue is largest barrier at this time and fear/avoidance Patient with improved endurance and single leg stability this visit. Patient required fewer rest breaks and moments of "shaking out ankle" due to mm work. Resumed suitcase walk and progressed lateral stepping to Blue [place].     Magda Is progressing well towards her goals.   Pt prognosis is Excellent.       Pt's spiritual, cultural and educational needs considered and pt agreeable to plan of care and goals.     Anticipated barriers to physical therapy: comorbidities, fear and avoidance of left ankle/knee ROM    Goals:   GOALS: Short Term Goals:  4 weeks  1. Report decreased left ankle and knee pain </= 3/10 with standing and walking with AD to increase tolerance for ADLs. MET  2. Increase left ankle AROM by 5 degrees in order to walk with min to no compensation. MET  3. Pt will demo good sitting and standing posture for improved spine and joint alignment for improved biomechanics. MET  4. Pt to tolerate HEP to improve ROM and independence with ADL's. MET  5. Pt will perform standing squat without AD with equal weight bearing on each lower extremity for standing ADLs.  PROGRESSING; NOT MET     Long Term Goals: 8 weeks  1. Report decreased left ankle and knee pain </= 1/10 with walking and stair negotiation without AD to increase tolerance for increased QoL and improved ADLs. PROGRESSING; NOT " MET  2. Patient goal: to get back to walking normally. PROGRESSING; NOT MET  3. Increase strength to >/= 4+/5 for BLE to increase tolerance for ADL and work activities. PROGRESSING; NOT MET  4. Pt will report at </= 35% impaired on ANKLE FOTO score to demo increased functional mobility. PROGRESSING; NOT MET  5. Pt will be able to ambulate community distances and negotiate stairs with minimal ankle pain for increased functional mobility and QoL. PROGRESSING; NOT MET    PLAN     Cont per POC.   Progress quad, lateral hip and calf strengthening    Rohith Cisneros, PTA

## 2023-06-15 ENCOUNTER — CLINICAL SUPPORT (OUTPATIENT)
Dept: REHABILITATION | Facility: HOSPITAL | Age: 28
End: 2023-06-15
Payer: COMMERCIAL

## 2023-06-15 DIAGNOSIS — R53.1 DECREASED STRENGTH: ICD-10-CM

## 2023-06-15 DIAGNOSIS — M25.672 DECREASED RANGE OF MOTION OF LEFT ANKLE: Primary | ICD-10-CM

## 2023-06-15 PROCEDURE — 97112 NEUROMUSCULAR REEDUCATION: CPT | Mod: PN,CQ

## 2023-06-15 PROCEDURE — 97530 THERAPEUTIC ACTIVITIES: CPT | Mod: PN,CQ

## 2023-06-15 NOTE — PROGRESS NOTES
"  [facility]   Physical Therapy Treatment Note     Name: Magda Lowery  Clinic Number: [id number]    Therapy Diagnosis:   Encounter Diagnoses   Name Primary?    Decreased range of motion of left ankle Yes    Decreased strength        Physician: Liudmila Monge PA-C  Visit Date: 6/15/2023  Physician Orders: PT Eval and Treat  Medical Diagnosis from Referral:   S82.842A (ICD-10-CM) - Bimalleolar ankle fracture, left, closed, initial encounter   S83.005D (ICD-10-CM) - Closed dislocation of left patella, subsequent encounter      Evaluation Date: 2/17/2023  Authorization Period Expiration: 02/06/2024  Plan of Care Expiration: 5/30/23  Progress Note Due: 5/30/23  Visit # / Visits authorized: 32/34 (+2)  FOTO: 11/10 - Next   PTA visit 2/5    Precautions: Standard, asthma, hypothyroidism, left knee range of motion as tolerated, left ankle WBAT with boot    Time In: 1002 AM  Time Out: 1100 AM  Total Billable Time:  53 minutes (1 NMR, 3 TA, )    SUBJECTIVE     Pt reports: No pain today. Reports no issues at work.     She was compliant with home exercise program.  Response to previous treatment: increased LLE WB   Functional change: walking more    Pain: 2/10  Location: left ankle     OBJECTIVE     Objective Measures updated at progress report unless specified.   See UPOC on 4/18/33 5/11/23  Dorsiflexion:  AROM: +6 (pre manuals) to +10 (post manuals)  PROM: +11 (pre) to +14 (post)    Quad via microfet: right: 32.3 kg left: 25.4 kg (79%)  Left single leg stance: 48"    Treatment     Magda received the treatments listed below:      manual therapy techniques: Joint mobilizations, Myofacial release, and Soft tissue Mobilization were applied to the: ankle for 00 minutes, including:  TC A to P mobs   TC distraction  Dorsiflexion PROM    therapeutic exercises to develop strength, endurance, ROM, flexibility, posture, and core stabilization for 00 minutes including testing:    Supine TB Eversion 2x12x5" " "L ankle NT    Double leg heel raise 3 x 10 ([place] toe)   Ankle DF on step with ankle IR 10x10   Resume next visit.   Standing BAPs board (L2)   A/P: 30x   INV/EV: 30x   Circles CW/CCW: 30x each  Toe walkin laps at ballet [employer]  Fitter calf stretch: 3x30'' double leg   Suitcase squats: 2x10, 20# kettle bell in left hand  Cybex Leg extensions (double leg): 25 pounds (setting 15) 3x5 p! In left knee - not today  Lateral step downs: 2x12, 6 inch step    modified sL heel raise at stairs 2 x 10 - d/c next for shuttle    therapeutic activities to improve functional performance for 38minutes, including:    Lateral Walk with GTB around forefoot 2 laps, BluTB x1 lap (x40 feet )  Step down (heel tap)  2 x 10 (emphasis on L CKC IR and neutral ankle rotation)   RDL (left stance leg): 2x10 to Kettle bell on 4" step, (Resume next visit) 10x with 20# kettle bell on 4" step (moderate cueing)  Suitcase walk: 40'x2 with 30# kettle bell in left hand  Left Leg Press: seat 4, 7 plates 3x10   Lateral bounding no hoppinx holding blue medicine ball.   Inclined squat off ramp at // bars 2 x 10    Not today, resume next:  Step ups with contralateral hip flexion at steps 2x10   Lateral crab walkin lap of 40' with red theraband       neuromuscular re-education activities to improve: Balance, Coordination, Proprioception, and Posture for 15 minutes. The following activities were included:  [employer] Balance board:   Taps: S/S and A/P x20ea   Static Balance: 2x1' ea  Single leg stance: 3x30'' holds left on airex  Single leg stance with ball toss on Firm: 1'x3  Tandem walking on foam: 3 laps     Patient Education and Home Exercises     Home Exercises Provided and Patient Education Provided   Education provided:   - proper foot wear  - course of therapy, prognosis  - importance of home exercise program - updated 3/21/23    Written Home Exercises Provided: yes. Exercises were reviewed and Magda was able to demonstrate them prior to " the end of the session.  Magda demonstrated good  understanding of the education provided. See EMR under Patient Instructions for exercises provided during therapy sessions    ASSESSMENT     Magda is a 27 y.o. female referred to outpatient Physical Therapy with a medical diagnosis of Left Bimalleolar ankle fracture and Closed dislocation of left patella presenting to PT at [facility]. She received a left ankle ORIF on 1/6/23 and was non-operative knee immobilizer for left lateral patellar dislocation (reduced at external rotation visit on say of injury 12/29/22.  Patient reports decreased mm fatigue throughout tx likely due to increased focus on ankle eversion. Resumed lateral bounding, patient reports it is easy, progressed to using blue medicine ball for increased challenge. Patient tolerates tx with only reprot of general fatigue.     Magda Is progressing well towards her goals.   Pt prognosis is Excellent.       Pt's spiritual, cultural and educational needs considered and pt agreeable to plan of care and goals.     Anticipated barriers to physical therapy: comorbidities, fear and avoidance of left ankle/knee ROM    Goals:   GOALS: Short Term Goals:  4 weeks  1. Report decreased left ankle and knee pain </= 3/10 with standing and walking with AD to increase tolerance for ADLs. MET  2. Increase left ankle AROM by 5 degrees in order to walk with min to no compensation. MET  3. Pt will demo good sitting and standing posture for improved spine and joint alignment for improved biomechanics. MET  4. Pt to tolerate HEP to improve ROM and independence with ADL's. MET  5. Pt will perform standing squat without AD with equal weight bearing on each lower extremity for standing ADLs.  PROGRESSING; NOT MET     Long Term Goals: 8 weeks  1. Report decreased left ankle and knee pain </= 1/10 with walking and stair negotiation without AD to increase tolerance for increased QoL and improved ADLs.  PROGRESSING; NOT MET  2. Patient goal: to get back to walking normally. PROGRESSING; NOT MET  3. Increase strength to >/= 4+/5 for BLE to increase tolerance for ADL and work activities. PROGRESSING; NOT MET  4. Pt will report at </= 35% impaired on ANKLE FOTO score to demo increased functional mobility. PROGRESSING; NOT MET  5. Pt will be able to ambulate community distances and negotiate stairs with minimal ankle pain for increased functional mobility and QoL. PROGRESSING; NOT MET    PLAN     Cont per POC.   Progress quad, lateral hip and calf strengthening    Rohith Cisneros, PTA

## 2023-06-20 ENCOUNTER — CLINICAL SUPPORT (OUTPATIENT)
Dept: REHABILITATION | Facility: HOSPITAL | Age: 28
End: 2023-06-20
Payer: COMMERCIAL

## 2023-06-20 DIAGNOSIS — M25.672 DECREASED RANGE OF MOTION OF LEFT ANKLE: Primary | ICD-10-CM

## 2023-06-20 DIAGNOSIS — R53.1 DECREASED STRENGTH: ICD-10-CM

## 2023-06-20 PROCEDURE — 97530 THERAPEUTIC ACTIVITIES: CPT | Mod: PN

## 2023-06-20 PROCEDURE — 97110 THERAPEUTIC EXERCISES: CPT | Mod: PN

## 2023-06-20 NOTE — PROGRESS NOTES
"  [facility]   Physical Therapy Treatment Note     Name: Magda Lowery  Clinic Number: [id number]    Therapy Diagnosis:   Encounter Diagnoses   Name Primary?    Decreased range of motion of left ankle Yes    Decreased strength      Physician: Liudmila Monge PA-C  Visit Date: 6/20/2023  Physician Orders: PT Eval and Treat  Medical Diagnosis from Referral:   S82.842A (ICD-10-CM) - Bimalleolar ankle fracture, left, closed, initial encounter   S83.005D (ICD-10-CM) - Closed dislocation of left patella, subsequent encounter      Evaluation Date: 2/17/2023  Authorization Period Expiration: 02/06/2024  Plan of Care Expiration: 5/30/23, 6/30/23  Progress Note Due: 5/30/23  Visit # / Visits authorized: 32/34 (+2)  FOTO: 4/10 - done today 6/20/23  PTA visit: 0/5    Precautions: Standard, asthma, hypothyroidism, left knee range of motion as tolerated, left ankle WBAT with boot    Time In: 10:05 AM  Time Out: 11:00 AM  Total Billable Time:  55 minutes (2 TE, 2 TA )    SUBJECTIVE     Pt reports: has some mild lateral ankle pain after last visit that lasted a day or so, but then resolved. Was a slight pinch but not bad.     She was compliant with home exercise program.  Response to previous treatment: increased LLE WB   Functional change: walking more    Pain: 2/10  Location: left ankle     OBJECTIVE     Objective Measures updated at progress report unless specified.   See UPOC on 4/18/33 5/11/23  Dorsiflexion:  AROM: +6 (pre manuals) to +10 (post manuals)  PROM: +11 (pre) to +14 (post)    Quad via microfet: right: 32.3 kg left: 25.4 kg (79%)  Left single leg stance: 48"    Treatment     Magda received the treatments listed below:      manual therapy techniques: Joint mobilizations, Myofacial release, and Soft tissue Mobilization were applied to the: ankle for 00 minutes, including:  TC A to P mobs   TC distraction  Dorsiflexion PROM    therapeutic exercises to develop strength, endurance, ROM, " "flexibility, posture, and core stabilization for 25 minutes including testing: FOTO    Double leg heel raise 3 x 10 ([place] toe) off stair step  Ankle DF on step with ankle internal rotation: 30x - d/c next  Fitter calf stretch: 3x30'' double leg - d/c next  Suitcase squats: 2x12, 25# kettle bell in left hand  Cybex Leg extensions (double leg): 25 pounds (setting 15) 3x5 p! In left knee - not today  Lateral step downs: 2x15, 6 inch step    therapeutic activities to improve functional performance for 30 minutes, including:    Lateral Walk with GTB around forefoot 2 laps, BluTB x1 lap (x40 feet )  Step down (heel tap)  2 x 10 (emphasis on L CKC IR and neutral ankle rotation)   RDL (left stance leg): 2x10 to Kettle bell on 4" step, (Resume next visit) 10x with 20# kettle bell on 4" step (moderate cueing)  Suitcase walk: 300' with 30# kettle bell in left hand  Left Leg Press: seat 5, 2x5 @ 10 plates then 20x @8 plates   Forward light joggin laps of 80'  Carioca: 2 laps of 80'  Lateral bounding no hoppinx holding blue medicine ball.   Inclined squat off ramp at // bars 2 x 10    Not today, resume next:  Step ups with contralateral hip flexion at steps 2x10   Lateral crab walkin lap of 40' with red theraband     Patient Education and Home Exercises     Home Exercises Provided and Patient Education Provided   Education provided:   - proper foot wear  - course of therapy, prognosis  - importance of home exercise program - updated 3/21/23    Written Home Exercises Provided: yes. Exercises were reviewed and Magda was able to demonstrate them prior to the end of the session.  Magda demonstrated good  understanding of the education provided. See EMR under Patient Instructions for exercises provided during therapy sessions    ASSESSMENT     Magda is a 27 y.o. female referred to outpatient Physical Therapy with a medical diagnosis of Left Bimalleolar ankle fracture and Closed dislocation of left patella presenting to " PT at [facility]. She received a left ankle ORIF on 1/6/23 and was non-operative knee immobilizer for left lateral patellar dislocation (reduced at external rotation visit on say of injury 12/29/22.  Started gentle single plane plyometrics and some jogging with no pain. Patient has low confidence with heavy and single leg activities, but does very well after the first set of activities. She zia be discharged next week, and she was told to resume all normal activities that are non-sport related.     Magda Is progressing well towards her goals.   Pt prognosis is Excellent.       Pt's spiritual, cultural and educational needs considered and pt agreeable to plan of care and goals.     Anticipated barriers to physical therapy: comorbidities, fear and avoidance of left ankle/knee ROM    Goals:   GOALS: Short Term Goals:  4 weeks  1. Report decreased left ankle and knee pain </= 3/10 with standing and walking with AD to increase tolerance for ADLs. MET  2. Increase left ankle AROM by 5 degrees in order to walk with min to no compensation. MET  3. Pt will demo good sitting and standing posture for improved spine and joint alignment for improved biomechanics. MET  4. Pt to tolerate HEP to improve ROM and independence with ADL's. MET  5. Pt will perform standing squat without AD with equal weight bearing on each lower extremity for standing ADLs.  PROGRESSING; NOT MET     Long Term Goals: 8 weeks  1. Report decreased left ankle and knee pain </= 1/10 with walking and stair negotiation without AD to increase tolerance for increased QoL and improved ADLs. PROGRESSING; NOT MET  2. Patient goal: to get back to walking normally. PROGRESSING; NOT MET  3. Increase strength to >/= 4+/5 for BLE to increase tolerance for ADL and work activities. PROGRESSING; NOT MET  4. Pt will report at </= 35% impaired on ANKLE FOTO score to demo increased functional mobility. PROGRESSING; NOT MET  5. Pt will be able to  ambulate community distances and negotiate stairs with minimal ankle pain for increased functional mobility and QoL. PROGRESSING; NOT MET    PLAN     Cont per POC.   Progress quad, lateral hip and calf strengthening    Yimi Medina, PT

## 2023-06-22 ENCOUNTER — CLINICAL SUPPORT (OUTPATIENT)
Dept: REHABILITATION | Facility: HOSPITAL | Age: 28
End: 2023-06-22
Payer: COMMERCIAL

## 2023-06-22 DIAGNOSIS — R53.1 DECREASED STRENGTH: ICD-10-CM

## 2023-06-22 DIAGNOSIS — M25.672 DECREASED RANGE OF MOTION OF LEFT ANKLE: Primary | ICD-10-CM

## 2023-06-22 PROCEDURE — 97530 THERAPEUTIC ACTIVITIES: CPT | Mod: PN,CQ

## 2023-06-22 PROCEDURE — 97110 THERAPEUTIC EXERCISES: CPT | Mod: PN,CQ

## 2023-06-22 NOTE — PROGRESS NOTES
"[facility]   Physical Therapy Treatment Note      Name: Magda Lowery  Clinic Number: [id number]     Therapy Diagnosis:        Encounter Diagnoses   Name Primary?    Decreased range of motion of left ankle Yes    Decreased strength        Physician: Liudmila Monge PA-C  Visit Date: 6/22/2023  Physician Orders: PT Eval and Treat  Medical Diagnosis from Referral:   S82.842A (ICD-10-CM) - Bimalleolar ankle fracture, left, closed, initial encounter   S83.005D (ICD-10-CM) - Closed dislocation of left patella, subsequent encounter      Evaluation Date: 2/17/2023  Authorization Period Expiration: 02/06/2024  Plan of Care Expiration: 5/30/23, 6/30/23  Progress Note Due: 5/30/23  Visit # / Visits authorized: 34/34 (+2)  FOTO: 4/10 - done today 6/20/23  PTA visit: 1/5     Precautions: Standard, asthma, hypothyroidism, left knee range of motion as tolerated, left ankle WBAT with boot     Time In: 10:05 AM  Time Out: 11:00 AM  Total Billable Time:  55 minutes (1 TE, 3 TA )     SUBJECTIVE      Pt reports: has some mild lateral ankle pain after last visit that lasted a day or so, but then resolved. Was a slight pinch but not bad.      She was compliant with home exercise program.  Response to previous treatment: increased LLE WB   Functional change: walking more     Pain: 2/10  Location: left ankle      OBJECTIVE      Objective Measures updated at progress report unless specified.   See UPOC on 4/18/33 5/11/23  Dorsiflexion:  AROM: +6 (pre manuals) to +10 (post manuals)  PROM: +11 (pre) to +14 (post)     Quad via microfet: right: 32.3 kg left: 25.4 kg (79%)  Left single leg stance: 48"     Treatment      Magda received the treatments listed below:       manual therapy techniques: Joint mobilizations, Myofacial release, and Soft tissue Mobilization were applied to the: ankle for 00 minutes, including:  TC A to P mobs   TC distraction  Dorsiflexion PROM     therapeutic exercises to develop strength, " "endurance, ROM, flexibility, posture, and core stabilization for 15 minutes including testing:      Double leg heel raise 3 x 10 ([place] toe) off stair step  Calf fitter stretch: 3x30'' holds double leg   Cybex Leg extensions (double leg): 25 pounds (setting 15) 3x5 p! In left knee - not today  Lateral step downs: 2x15, 6 inch step not today     therapeutic activities to improve functional performance for 40 minutes, including:     Forward light joggin laps of 80'  Carioca: 2 laps of 80'  Skippin laps of 80'  Lateral bounding no hoppinx holding blue medicine ball.   Lateral Walk with GTB around forefoot 2 laps, BluTB x1 lap (x40 feet )  Step down (heel tap)  2 x 10 (emphasis on L CKC IR and neutral ankle rotation) 4"   RDL (left stance leg): 2x10 to Kettle bell on 4" step, (Resume next visit) 10x with 20# kettle bell on 4" step (moderate cueing)  Suitcase squats: 2x10, 30# kettle bell in left hand   Suitcase walk: 150' with 30# kettle bell in left hand (in between suitcase squat sets)  Left Leg Press: seat 5, 2x5 @ 10 plates then 20x @8 plates   Inclined squat off ramp at // bars 3 x 10     Not today, resume next:  Step ups with contralateral hip flexion at steps 2x10   Lateral crab walkin lap of 40' with red theraband      Patient Education and Home Exercises      Home Exercises Provided and Patient Education Provided   Education provided:   - proper foot wear  - course of therapy, prognosis  - importance of home exercise program - updated 3/21/23     Written Home Exercises Provided: yes. Exercises were reviewed and Magda was able to demonstrate them prior to the end of the session.  Magda demonstrated good  understanding of the education provided. See EMR under Patient Instructions for exercises provided during therapy sessions     ASSESSMENT      Magda is a 27 y.o. female referred to outpatient Physical Therapy with a medical diagnosis of Left Bimalleolar ankle fracture and Closed dislocation of " left patella presenting to PT at [facility]. She received a left ankle ORIF on 1/6/23 and was non-operative knee immobilizer for left lateral patellar dislocation (reduced at external rotation visit on say of injury 12/29/22.  Continues to need frequent cuing throughout session to avoid excessive knee valgus, external rotation and to keep heel flat with squatting and step down activities. Overall tolerates session well. Moderate fatigue post session. She will be discharged next week, and she was told to resume all normal activities that are non-sport related.      Magda Is progressing well towards her goals.   Pt prognosis is Excellent.         Pt's spiritual, cultural and educational needs considered and pt agreeable to plan of care and goals.     Anticipated barriers to physical therapy: comorbidities, fear and avoidance of left ankle/knee ROM     Goals:   GOALS: Short Term Goals:  4 weeks  1. Report decreased left ankle and knee pain </= 3/10 with standing and walking with AD to increase tolerance for ADLs. MET  2. Increase left ankle AROM by 5 degrees in order to walk with min to no compensation. MET  3. Pt will demo good sitting and standing posture for improved spine and joint alignment for improved biomechanics. MET  4. Pt to tolerate HEP to improve ROM and independence with ADL's. MET  5. Pt will perform standing squat without AD with equal weight bearing on each lower extremity for standing ADLs.  PROGRESSING; NOT MET     Long Term Goals: 8 weeks  1. Report decreased left ankle and knee pain </= 1/10 with walking and stair negotiation without AD to increase tolerance for increased QoL and improved ADLs. PROGRESSING; NOT MET  2. Patient goal: to get back to walking normally. PROGRESSING; NOT MET  3. Increase strength to >/= 4+/5 for BLE to increase tolerance for ADL and work activities. PROGRESSING; NOT MET  4. Pt will report at </= 35% impaired on ANKLE FOTO score to demo  increased functional mobility. PROGRESSING; NOT MET  5. Pt will be able to ambulate community distances and negotiate stairs with minimal ankle pain for increased functional mobility and QoL. PROGRESSING; NOT MET     PLAN      Cont per POC.   Progress quad, lateral hip and calf strengthening     Destiney Nicholson, PTA

## 2023-06-22 NOTE — PROGRESS NOTES
"  [facility]   Physical Therapy Treatment Note     Name: Magda Lowery  Clinic Number: [id number]    Therapy Diagnosis:   Encounter Diagnoses   Name Primary?    Decreased range of motion of left ankle Yes    Decreased strength      Physician: Liudmila Monge PA-C  Visit Date: 6/22/2023  Physician Orders: PT Eval and Treat  Medical Diagnosis from Referral:   S82.842A (ICD-10-CM) - Bimalleolar ankle fracture, left, closed, initial encounter   S83.005D (ICD-10-CM) - Closed dislocation of left patella, subsequent encounter      Evaluation Date: 2/17/2023  Authorization Period Expiration: 02/06/2024  Plan of Care Expiration: 5/30/23, 6/30/23  Progress Note Due: 5/30/23  Visit # / Visits authorized: 32/34 (+2)  FOTO: 4/10 - done today 6/20/23  PTA visit: 0/5    Precautions: Standard, asthma, hypothyroidism, left knee range of motion as tolerated, left ankle WBAT with boot    Time In: 10:05 AM  Time Out: 11:00 AM  Total Billable Time:  55 minutes (2 TE, 2 TA )    SUBJECTIVE     Pt reports: has some mild lateral ankle pain after last visit that lasted a day or so, but then resolved. Was a slight pinch but not bad.     She was compliant with home exercise program.  Response to previous treatment: increased LLE WB   Functional change: walking more    Pain: 2/10  Location: left ankle     OBJECTIVE     Objective Measures updated at progress report unless specified.   See UPOC on 4/18/33 5/11/23  Dorsiflexion:  AROM: +6 (pre manuals) to +10 (post manuals)  PROM: +11 (pre) to +14 (post)    Quad via microfet: right: 32.3 kg left: 25.4 kg (79%)  Left single leg stance: 48"    Treatment     Magda received the treatments listed below:      manual therapy techniques: Joint mobilizations, Myofacial release, and Soft tissue Mobilization were applied to the: ankle for 00 minutes, including:  TC A to P mobs   TC distraction  Dorsiflexion PROM    therapeutic exercises to develop strength, endurance, ROM, " "flexibility, posture, and core stabilization for 25 minutes including testing:     Double leg heel raise 3 x 10 ([place] toe) off stair step  Suitcase squats: 2x12, 25# kettle bell in left hand  Calf fitter stretch: 3x30'' holds double leg   Cybex Leg extensions (double leg): 25 pounds (setting 15) 3x5 p! In left knee - not today  Lateral step downs: 2x15, 6 inch step    therapeutic activities to improve functional performance for 30 minutes, including:    Forward light joggin laps of 80'  Carioca: 2 laps of 80'  Skippin laps of 80'  Lateral bounding no hoppinx holding blue medicine ball.   Lateral Walk with GTB around forefoot 2 laps, BluTB x1 lap (x40 feet )  Step down (heel tap)  2 x 10 (emphasis on L CKC IR and neutral ankle rotation)   RDL (left stance leg): 2x10 to Kettle bell on 4" step, (Resume next visit) 10x with 20# kettle bell on 4" step (moderate cueing)  Suitcase walk: 300' with 30# kettle bell in left hand  Left Leg Press: seat 5, 2x5 @ 10 plates then 20x @8 plates   Inclined squat off ramp at // bars 2 x 10    Not today, resume next:  Step ups with contralateral hip flexion at steps 2x10   Lateral crab walkin lap of 40' with red theraband     Patient Education and Home Exercises     Home Exercises Provided and Patient Education Provided   Education provided:   - proper foot wear  - course of therapy, prognosis  - importance of home exercise program - updated 3/21/23    Written Home Exercises Provided: yes. Exercises were reviewed and Magda was able to demonstrate them prior to the end of the session.  Magda demonstrated good  understanding of the education provided. See EMR under Patient Instructions for exercises provided during therapy sessions    ASSESSMENT     Magda is a 27 y.o. female referred to outpatient Physical Therapy with a medical diagnosis of Left Bimalleolar ankle fracture and Closed dislocation of left patella presenting to PT at [facility]. " She received a left ankle ORIF on 1/6/23 and was non-operative knee immobilizer for left lateral patellar dislocation (reduced at external rotation visit on say of injury 12/29/22.  Started gentle single plane plyometrics and some jogging with no pain. Patient has low confidence with heavy and single leg activities, but does very well after the first set of activities. She zia be discharged next week, and she was told to resume all normal activities that are non-sport related.     Magda Is progressing well towards her goals.   Pt prognosis is Excellent.       Pt's spiritual, cultural and educational needs considered and pt agreeable to plan of care and goals.     Anticipated barriers to physical therapy: comorbidities, fear and avoidance of left ankle/knee ROM    Goals:   GOALS: Short Term Goals:  4 weeks  1. Report decreased left ankle and knee pain </= 3/10 with standing and walking with AD to increase tolerance for ADLs. MET  2. Increase left ankle AROM by 5 degrees in order to walk with min to no compensation. MET  3. Pt will demo good sitting and standing posture for improved spine and joint alignment for improved biomechanics. MET  4. Pt to tolerate HEP to improve ROM and independence with ADL's. MET  5. Pt will perform standing squat without AD with equal weight bearing on each lower extremity for standing ADLs.  PROGRESSING; NOT MET     Long Term Goals: 8 weeks  1. Report decreased left ankle and knee pain </= 1/10 with walking and stair negotiation without AD to increase tolerance for increased QoL and improved ADLs. PROGRESSING; NOT MET  2. Patient goal: to get back to walking normally. PROGRESSING; NOT MET  3. Increase strength to >/= 4+/5 for BLE to increase tolerance for ADL and work activities. PROGRESSING; NOT MET  4. Pt will report at </= 35% impaired on ANKLE FOTO score to demo increased functional mobility. PROGRESSING; NOT MET  5. Pt will be able to ambulate community distances and negotiate  stairs with minimal ankle pain for increased functional mobility and QoL. PROGRESSING; NOT MET    PLAN     Cont per POC.   Progress quad, lateral hip and calf strengthening    Yimi Medina, PT

## 2023-06-27 ENCOUNTER — CLINICAL SUPPORT (OUTPATIENT)
Dept: REHABILITATION | Facility: HOSPITAL | Age: 28
End: 2023-06-27
Payer: COMMERCIAL

## 2023-06-27 DIAGNOSIS — M25.672 DECREASED RANGE OF MOTION OF LEFT ANKLE: Primary | ICD-10-CM

## 2023-06-27 DIAGNOSIS — R53.1 DECREASED STRENGTH: ICD-10-CM

## 2023-06-27 PROCEDURE — 97110 THERAPEUTIC EXERCISES: CPT | Mod: PN,CQ

## 2023-06-27 PROCEDURE — 97530 THERAPEUTIC ACTIVITIES: CPT | Mod: PN,CQ

## 2023-06-27 NOTE — PROGRESS NOTES
"[facility]   Physical Therapy Treatment Note      Name: Magda Lowery  Clinic Number: [id number]     Therapy Diagnosis:        Encounter Diagnoses   Name Primary?    Decreased range of motion of left ankle Yes    Decreased strength        Physician: Liudmila Monge PA-C  Visit Date: 6/22/2023  Physician Orders: PT Eval and Treat  Medical Diagnosis from Referral:   S82.842A (ICD-10-CM) - Bimalleolar ankle fracture, left, closed, initial encounter   S83.005D (ICD-10-CM) - Closed dislocation of left patella, subsequent encounter      Evaluation Date: 2/17/2023  Authorization Period Expiration: 02/06/2024  Plan of Care Expiration: 5/30/23, 6/30/23  Progress Note Due: 5/30/23  Visit # / Visits authorized: 34/34 (+2)  FOTO: 5/10 - done today 6/20/23  PTA visit: 2/5     Precautions: Standard, asthma, hypothyroidism, left knee range of motion as tolerated, left ankle WBAT with boot     Time In: 10:03 AM  Time Out: 11:00 AM  Total Billable Time:  55 minutes (1 TE, 3 TA )     SUBJECTIVE      Pt reports: No complaints.      She was compliant with home exercise program.  Response to previous treatment: increased LLE WB   Functional change: walking more     Pain: 2/10  Location: left ankle      OBJECTIVE      Objective Measures updated at progress report unless specified.   See UPOC on 4/18/33 5/11/23  Dorsiflexion:  AROM: +6 (pre manuals) to +10 (post manuals)  PROM: +11 (pre) to +14 (post)     Quad via microfet: right: 32.3 kg left: 25.4 kg (79%)  Left single leg stance: 48"     Treatment      Magda received the treatments listed below:       manual therapy techniques: Joint mobilizations, Myofacial release, and Soft tissue Mobilization were applied to the: ankle for 00 minutes, including:  TC A to P mobs   TC distraction  Dorsiflexion PROM     therapeutic exercises to develop strength, endurance, ROM, flexibility, posture, and core stabilization for 15 minutes including testing:      Double leg " "heel raise 3 x 10 ([place] toe) off stair step  Calf fitter stretch: 3x30'' holds double leg   Cybex Leg extensions (double leg): 25 pounds (setting 15) 3x5 p! In left knee - not today  Lateral step downs: 2x15, 6 inch step not today     therapeutic activities to improve functional performance for 40 minutes, including:     Forward light joggin laps of 80'  Carioca: 2 laps of 80'  Skippin laps of 80'  Lateral bounding BluTB (x40 feet )  Step down (heel tap)  2 x 10 (emphasis on L CKC IR and neutral ankle rotation) 4"   RDL (left stance leg): 2x10 to Kettle bell on 4" step, (Resume next visit) 10x with 20# kettle bell on 4" step (moderate cueing)  Suitcase squats: 2x10, 30# kettle bell in left hand   Suitcase walk: 150' x3 with 30# kettle bell in left hand (Rest breaks between laps)  Left Leg Press: seat 5, 2x5 @ 10 plates then 20x @8 plates   Inclined squat off ramp at // bars 3 x 10     Not today, resume next:  Step ups with contralateral hip flexion at steps 2x10   Lateral crab walkin lap of 40' with red theraband      Patient Education and Home Exercises      Home Exercises Provided and Patient Education Provided   Education provided:   - proper foot wear  - course of therapy, prognosis  - importance of home exercise program - updated 3/21/23     Written Home Exercises Provided: yes. Exercises were reviewed and Magda was able to demonstrate them prior to the end of the session.  Magda demonstrated good  understanding of the education provided. See EMR under Patient Instructions for exercises provided during therapy sessions     ASSESSMENT      Magda is a 27 y.o. female referred to outpatient Physical Therapy with a medical diagnosis of Left Bimalleolar ankle fracture and Closed dislocation of left patella presenting to PT at [facility]. She received a left ankle ORIF on 23 and was non-operative knee immobilizer for left lateral patellar dislocation (reduced at external " rotation visit on say of injury 12/29/22.  Patient tolerates tx well with out provocation of pain. Patient progressed side step to using Blue TB for 3 laps to improve ankle eversion and hip ABD strength to progress towards goals.     Magda Is progressing well towards her goals.   Pt prognosis is Excellent.         Pt's spiritual, cultural and educational needs considered and pt agreeable to plan of care and goals.     Anticipated barriers to physical therapy: comorbidities, fear and avoidance of left ankle/knee ROM     Goals:   GOALS: Short Term Goals:  4 weeks  1. Report decreased left ankle and knee pain </= 3/10 with standing and walking with AD to increase tolerance for ADLs. MET  2. Increase left ankle AROM by 5 degrees in order to walk with min to no compensation. MET  3. Pt will demo good sitting and standing posture for improved spine and joint alignment for improved biomechanics. MET  4. Pt to tolerate HEP to improve ROM and independence with ADL's. MET  5. Pt will perform standing squat without AD with equal weight bearing on each lower extremity for standing ADLs.  PROGRESSING; NOT MET     Long Term Goals: 8 weeks  1. Report decreased left ankle and knee pain </= 1/10 with walking and stair negotiation without AD to increase tolerance for increased QoL and improved ADLs. PROGRESSING; NOT MET  2. Patient goal: to get back to walking normally. PROGRESSING; NOT MET  3. Increase strength to >/= 4+/5 for BLE to increase tolerance for ADL and work activities. PROGRESSING; NOT MET  4. Pt will report at </= 35% impaired on ANKLE FOTO score to demo increased functional mobility. PROGRESSING; NOT MET  5. Pt will be able to ambulate community distances and negotiate stairs with minimal ankle pain for increased functional mobility and QoL. PROGRESSING; NOT MET     PLAN      Cont per POC.   Progress quad, lateral hip and calf strengthening     Rohith Cisneros, PTA

## 2023-06-29 ENCOUNTER — CLINICAL SUPPORT (OUTPATIENT)
Dept: REHABILITATION | Facility: HOSPITAL | Age: 28
End: 2023-06-29
Payer: COMMERCIAL

## 2023-06-29 DIAGNOSIS — M25.672 DECREASED RANGE OF MOTION OF LEFT ANKLE: Primary | ICD-10-CM

## 2023-06-29 DIAGNOSIS — R53.1 DECREASED STRENGTH: ICD-10-CM

## 2023-06-29 PROCEDURE — 97110 THERAPEUTIC EXERCISES: CPT | Mod: PN

## 2023-06-29 PROCEDURE — 97530 THERAPEUTIC ACTIVITIES: CPT | Mod: PN

## 2023-06-29 NOTE — PROGRESS NOTES
"[facility]   Physical Therapy Treatment and Discharge Note      Name: Magda Lowery  Clinic Number: [id number]     Therapy Diagnosis:        Encounter Diagnoses   Name Primary?    Decreased range of motion of left ankle Yes    Decreased strength        Physician: Liudmila Monge PA-C  Visit Date: 6/22/2023  Physician Orders: PT Eval and Treat  Medical Diagnosis from Referral:   S82.842A (ICD-10-CM) - Bimalleolar ankle fracture, left, closed, initial encounter   S83.005D (ICD-10-CM) - Closed dislocation of left patella, subsequent encounter      Evaluation Date: 2/17/2023  Authorization Period Expiration: 02/06/2024  Plan of Care Expiration: 5/30/23, 6/30/23  Progress Note Due: 5/30/23  Visit # / Visits authorized: 35/34 (+2)  FOTO: 7/10 - Done today for d/c  PTA visit: 0/5     Precautions: Standard, asthma, hypothyroidism, left knee range of motion as tolerated, left ankle WBAT with boot     Time In: 10:05 AM  Time Out: 11:00 AM  Total Billable Time:  55 minutes (2 TE, 2 TA)     SUBJECTIVE      Pt reports: doing well and only thing that bothers her from time to time is her left knee at the front. No left ankle pain.     She was compliant with home exercise program.  Response to previous treatment: increased LLE WB   Functional change: walking more     Pain: 0/10  Location: left ankle      OBJECTIVE      Objective Measures updated at progress report unless specified.     Quad via microfet: right: 34.2 kg left: 33.7 kg  left knee ROM/strength: 6/29/2023   AROM: 0-130  PROM: 0-130  L/E Strength w/ MicroFET Muscle Naresh Dynamometer Right Left Pain/Dysfunction with Movement   (approx 4 sec hold w/ max contraction)   Hip Flexion 24.5 kg  27.8 kg     Hip Abduction 21.9 kg  29.6 kg     Quadriceps 34.2 kg  33.7 kg     Hamstrings 31.0 kg  28.3 kg       Single leg stance: 48" on left, 1 minutes on right  Squat: equal BLE WB with max knee flexion    Dorsiflexion:  AROM: +8 on left, +10 on right  MMT: " " BLE     Treatment      Magda received the treatments listed below:       manual therapy techniques: Joint mobilizations, Myofacial release, and Soft tissue Mobilization were applied to the: ankle for 00 minutes, including:  TC A to P mobs   TC distraction  Dorsiflexion PROM     therapeutic exercises to develop strength, endurance, ROM, flexibility, posture, and core stabilization for 25 minutes including testing: reassessment for d/c     Not today  Double leg heel raise 3 x 10 ([place] toe) off stair step  Calf fitter stretch: 3x30'' holds double leg      therapeutic activities to improve functional performance for 30 minutes, including:     Forward light joggin laps of 80'  Carioca: 2 laps of 80'  Skippin laps of 80'  Lateral boundinx Bilateral   Forward Step down (heel tap)  2x10 on 4 inch step then 6x on 6 inch step  RDL (left stance leg): 2x10 with 20# kettle bell on 4" step  Suitcase squats: 2x6, 30# kettle bell in left hand   Suitcase walk: 150'x3 with 30# kettle bell in left hand (1 Rest break throughout)  Left Leg Press: seat 5, 2x5 @ 10 plates then 20x @8 plates   Inclined squat off ramp at // bars 3 x 10     Patient Education and Home Exercises      Home Exercises Provided and Patient Education Provided   Education provided:   - proper foot wear  - course of therapy, prognosis  - importance of home exercise program - updated 3/21/23     Written Home Exercises Provided: yes. Exercises were reviewed and Magda was able to demonstrate them prior to the end of the session.  Magda demonstrated good  understanding of the education provided. See EMR under Patient Instructions for exercises provided during therapy sessions     ASSESSMENT      Magda is a 27 y.o. female referred to outpatient Physical Therapy with a medical diagnosis of Left Bimalleolar ankle fracture and Closed dislocation of left patella presenting to PT at [facility]. She received a left ankle ORIF on " 1/6/23 and was non-operative knee immobilizer for left lateral patellar dislocation (reduced at external rotation visit on say of injury 12/29/22.  Pt met all STG and LTG, minimal to no left ankle pain, good performance with plyometric training, and she does have some anterior left knee pain that is prominent with forward step downs and other deep squatting activities - likely meniscal involvement vs patellar tendinitis though pain has been gradually improving overall. She was encouraged to continue her exercises and only stop for left knee pain. Pt discharged from PT and will let us know if she has any questions.     Magda Is progressing well towards her goals.   Pt prognosis is Excellent.         Pt's spiritual, cultural and educational needs considered and pt agreeable to plan of care and goals.     Anticipated barriers to physical therapy: comorbidities, fear and avoidance of left ankle/knee ROM     Goals:   GOALS: Short Term Goals:  4 weeks  1. Report decreased left ankle and knee pain </= 3/10 with standing and walking with AD to increase tolerance for ADLs. MET  2. Increase left ankle AROM by 5 degrees in order to walk with min to no compensation. MET  3. Pt will demo good sitting and standing posture for improved spine and joint alignment for improved biomechanics. MET  4. Pt to tolerate HEP to improve ROM and independence with ADL's. MET  5. Pt will perform standing squat without AD with equal weight bearing on each lower extremity for standing ADLs.  MET     Long Term Goals: 8 weeks  1. Report decreased left ankle and knee pain </= 1/10 with walking and stair negotiation without AD to increase tolerance for increased QoL and improved ADLs. MET  2. Patient goal: to get back to walking normally. MET  3. Increase strength to >/= 4+/5 for BLE to increase tolerance for ADL and work activities. MET  4. Pt will report at </= 35% impaired on ANKLE FOTO score to demo increased functional mobility. MET  5. Pt will  be able to ambulate community distances and negotiate stairs with minimal ankle pain for increased functional mobility and QoL. ET     PLAN      Pt discharged from PT.     Yimi Medina, PT

## 2023-07-05 ENCOUNTER — PATIENT MESSAGE (OUTPATIENT)
Dept: ADMINISTRATIVE | Facility: OTHER | Age: 28
End: 2023-07-05
Payer: COMMERCIAL

## 2023-08-25 ENCOUNTER — LAB VISIT (OUTPATIENT)
Dept: LAB | Facility: HOSPITAL | Age: 28
End: 2023-08-25
Attending: FAMILY MEDICINE
Payer: COMMERCIAL

## 2023-08-25 ENCOUNTER — OFFICE VISIT (OUTPATIENT)
Dept: FAMILY MEDICINE | Facility: CLINIC | Age: 28
End: 2023-08-25
Payer: COMMERCIAL

## 2023-08-25 VITALS
OXYGEN SATURATION: 99 % | SYSTOLIC BLOOD PRESSURE: 120 MMHG | DIASTOLIC BLOOD PRESSURE: 72 MMHG | WEIGHT: 260.13 LBS | HEART RATE: 92 BPM | BODY MASS INDEX: 39.42 KG/M2 | HEIGHT: 68 IN

## 2023-08-25 DIAGNOSIS — E04.1 CYSTIC THYROID NODULE: ICD-10-CM

## 2023-08-25 DIAGNOSIS — J45.20 MILD INTERMITTENT ASTHMA WITHOUT COMPLICATION: ICD-10-CM

## 2023-08-25 DIAGNOSIS — Z00.01 ENCOUNTER FOR GENERAL ADULT MEDICAL EXAMINATION WITH ABNORMAL FINDINGS: Primary | ICD-10-CM

## 2023-08-25 DIAGNOSIS — E04.1 THYROID NODULE: ICD-10-CM

## 2023-08-25 DIAGNOSIS — E03.8 SUBCLINICAL HYPOTHYROIDISM: ICD-10-CM

## 2023-08-25 DIAGNOSIS — Z00.01 ENCOUNTER FOR GENERAL ADULT MEDICAL EXAMINATION WITH ABNORMAL FINDINGS: ICD-10-CM

## 2023-08-25 DIAGNOSIS — E66.01 CLASS 2 SEVERE OBESITY DUE TO EXCESS CALORIES WITH SERIOUS COMORBIDITY AND BODY MASS INDEX (BMI) OF 39.0 TO 39.9 IN ADULT: ICD-10-CM

## 2023-08-25 DIAGNOSIS — E04.2 MULTINODULAR THYROID: ICD-10-CM

## 2023-08-25 PROBLEM — R53.1 DECREASED STRENGTH: Status: RESOLVED | Noted: 2023-02-20 | Resolved: 2023-08-25

## 2023-08-25 PROBLEM — Z87.81 HISTORY OF FRACTURE OF LEFT ANKLE: Status: ACTIVE | Noted: 2022-12-29

## 2023-08-25 PROBLEM — M25.672 DECREASED RANGE OF MOTION OF LEFT ANKLE: Status: RESOLVED | Noted: 2023-02-20 | Resolved: 2023-08-25

## 2023-08-25 PROBLEM — E66.812 CLASS 2 SEVERE OBESITY DUE TO EXCESS CALORIES WITH SERIOUS COMORBIDITY AND BODY MASS INDEX (BMI) OF 39.0 TO 39.9 IN ADULT: Status: ACTIVE | Noted: 2023-08-25

## 2023-08-25 PROBLEM — S83.005A CLOSED DISLOCATION OF LEFT PATELLA: Status: RESOLVED | Noted: 2023-02-17 | Resolved: 2023-08-25

## 2023-08-25 LAB
BACTERIA #/AREA URNS AUTO: ABNORMAL /HPF
BILIRUB UR QL STRIP: NEGATIVE
CLARITY UR REFRACT.AUTO: ABNORMAL
COLOR UR AUTO: YELLOW
GLUCOSE UR QL STRIP: NEGATIVE
HGB UR QL STRIP: ABNORMAL
KETONES UR QL STRIP: NEGATIVE
LEUKOCYTE ESTERASE UR QL STRIP: NEGATIVE
MICROSCOPIC COMMENT: ABNORMAL
NITRITE UR QL STRIP: NEGATIVE
PH UR STRIP: 5 [PH] (ref 5–8)
PROT UR QL STRIP: NEGATIVE
RBC #/AREA URNS AUTO: 9 /HPF (ref 0–4)
SP GR UR STRIP: 1.02 (ref 1–1.03)
SQUAMOUS #/AREA URNS AUTO: 13 /HPF
URN SPEC COLLECT METH UR: ABNORMAL
WBC #/AREA URNS AUTO: 7 /HPF (ref 0–5)

## 2023-08-25 PROCEDURE — 3074F SYST BP LT 130 MM HG: CPT | Mod: CPTII,S$GLB,, | Performed by: FAMILY MEDICINE

## 2023-08-25 PROCEDURE — 81001 URINALYSIS AUTO W/SCOPE: CPT | Performed by: FAMILY MEDICINE

## 2023-08-25 PROCEDURE — 3008F PR BODY MASS INDEX (BMI) DOCUMENTED: ICD-10-PCS | Mod: CPTII,S$GLB,, | Performed by: FAMILY MEDICINE

## 2023-08-25 PROCEDURE — 3078F DIAST BP <80 MM HG: CPT | Mod: CPTII,S$GLB,, | Performed by: FAMILY MEDICINE

## 2023-08-25 PROCEDURE — 99395 PR PREVENTIVE VISIT,EST,18-39: ICD-10-PCS | Mod: S$GLB,,, | Performed by: FAMILY MEDICINE

## 2023-08-25 PROCEDURE — 1160F RVW MEDS BY RX/DR IN RCRD: CPT | Mod: CPTII,S$GLB,, | Performed by: FAMILY MEDICINE

## 2023-08-25 PROCEDURE — 1159F PR MEDICATION LIST DOCUMENTED IN MEDICAL RECORD: ICD-10-PCS | Mod: CPTII,S$GLB,, | Performed by: FAMILY MEDICINE

## 2023-08-25 PROCEDURE — 1160F PR REVIEW ALL MEDS BY PRESCRIBER/CLIN PHARMACIST DOCUMENTED: ICD-10-PCS | Mod: CPTII,S$GLB,, | Performed by: FAMILY MEDICINE

## 2023-08-25 PROCEDURE — 3074F PR MOST RECENT SYSTOLIC BLOOD PRESSURE < 130 MM HG: ICD-10-PCS | Mod: CPTII,S$GLB,, | Performed by: FAMILY MEDICINE

## 2023-08-25 PROCEDURE — 3078F PR MOST RECENT DIASTOLIC BLOOD PRESSURE < 80 MM HG: ICD-10-PCS | Mod: CPTII,S$GLB,, | Performed by: FAMILY MEDICINE

## 2023-08-25 PROCEDURE — 1159F MED LIST DOCD IN RCRD: CPT | Mod: CPTII,S$GLB,, | Performed by: FAMILY MEDICINE

## 2023-08-25 PROCEDURE — 99999 PR PBB SHADOW E&M-EST. PATIENT-LVL III: CPT | Mod: PBBFAC,,, | Performed by: FAMILY MEDICINE

## 2023-08-25 PROCEDURE — 99395 PREV VISIT EST AGE 18-39: CPT | Mod: S$GLB,,, | Performed by: FAMILY MEDICINE

## 2023-08-25 PROCEDURE — 3008F BODY MASS INDEX DOCD: CPT | Mod: CPTII,S$GLB,, | Performed by: FAMILY MEDICINE

## 2023-08-25 PROCEDURE — 99999 PR PBB SHADOW E&M-EST. PATIENT-LVL III: ICD-10-PCS | Mod: PBBFAC,,, | Performed by: FAMILY MEDICINE

## 2023-08-25 RX ORDER — ALBUTEROL SULFATE 90 UG/1
2 AEROSOL, METERED RESPIRATORY (INHALATION) EVERY 6 HOURS PRN
Qty: 18 G | Refills: 6 | Status: SHIPPED | OUTPATIENT
Start: 2023-08-25 | End: 2024-03-18 | Stop reason: SDUPTHER

## 2023-08-25 NOTE — PROGRESS NOTES
Subjective:       Patient ID: Magda Lowery is a 27 y.o. female.    Chief Complaint: Gynecologic Exam    Patient Active Problem List   Diagnosis    Thyroid nodule    Mild intermittent asthma without complication    Subclinical hypothyroidism    Multinodular thyroid    Cystic thyroid nodule    History of fracture of left ankle s/p surgery    Class 2 severe obesity due to excess calories with serious comorbidity and body mass index (BMI) of 39.0 to 39.9 in adult      HPI  26 yo female presents today for annual exam.   Overall doing well.   Recovered s/p left ankle fracture s/p surgery.   Appetite, mood, good.   Does have increase in asthma symptoms/triggers in winter, would like albuterol refilled. Usually very rare use in summer.     Review of Systems   All other systems reviewed and are negative.       Objective:     Vitals:    08/25/23 0828   BP: 120/72   Pulse: 92     Physical Exam  Vitals and nursing note reviewed.   Constitutional:       General: She is not in acute distress.     Appearance: Normal appearance. She is obese. She is not ill-appearing, toxic-appearing or diaphoretic.   HENT:      Head: Normocephalic and atraumatic.   Eyes:      General: No scleral icterus.     Conjunctiva/sclera: Conjunctivae normal.   Cardiovascular:      Rate and Rhythm: Normal rate.      Heart sounds: No murmur heard.  Pulmonary:      Effort: Pulmonary effort is normal. No respiratory distress.      Breath sounds: Normal breath sounds.   Abdominal:      General: Bowel sounds are normal.   Skin:     Coloration: Skin is not pale.   Neurological:      Mental Status: She is alert. Mental status is at baseline.   Psychiatric:         Attention and Perception: Attention and perception normal.         Mood and Affect: Mood and affect normal.         Speech: Speech normal.         Behavior: Behavior normal.         Cognition and Memory: Cognition and memory normal.         Judgment: Judgment normal.       Assessment:       1. Encounter  for general adult medical examination with abnormal findings    2. Mild intermittent asthma without complication    3. Multinodular thyroid    4. Subclinical hypothyroidism    5. Cystic thyroid nodule    6. Thyroid nodule    7. Class 2 severe obesity due to excess calories with serious comorbidity and body mass index (BMI) of 39.0 to 39.9 in adult        Plan:   Recent relevant labs results reviewed with patient.       1. Encounter for general adult medical examination with abnormal findings  -     Urinalysis, Reflex to Urine Culture Urine, Clean Catch; Future; Expected date: 08/25/2023  -     Hepatic Function Panel; Future; Expected date: 08/25/2023  -     Renal Function Panel; Future; Expected date: 08/25/2023  -     Lipid Panel; Future; Expected date: 08/25/2023  -     TSH; Future; Expected date: 08/25/2023  -     Hemoglobin A1C; Future; Expected date: 08/25/2023  -     CBC Auto Differential; Future; Expected date: 08/25/2023  -     Urinalysis, Reflex to Urine Culture Urine, Clean Catch; Future; Expected date: 08/25/2023  -     Hepatic Function Panel; Future; Expected date: 08/25/2023  -     Renal Function Panel; Future; Expected date: 08/25/2023  -     Lipid Panel; Future; Expected date: 08/25/2023  -     TSH; Future; Expected date: 08/25/2023  -     Hemoglobin A1C; Future; Expected date: 08/25/2023  -     CBC Auto Differential; Future; Expected date: 08/25/2023  - Risk and age appropriate anticipatory guidance.  reviewed and updated. Recommendations discussed with patient as appropriate.     2. Mild intermittent asthma without complication  -     albuterol (PROVENTIL/VENTOLIN HFA) 90 mcg/actuation inhaler; Inhale 2 puffs into the lungs every 6 (six) hours as needed for Wheezing or Shortness of Breath. Rescue  Dispense: 18 g; Refill: 6  Stable, controlled. Controller inhaler in winter months if very frequent use of albuterol    3. Multinodular thyroid  4. Subclinical hypothyroidism  5. Cystic thyroid  nodule  6. Thyroid nodule  -     TSH; Future; Expected date: 08/25/2023  Prior US reviewed, no ongoing follow up needed.    7. Class 2 severe obesity due to excess calories with serious comorbidity and body mass index (BMI) of 39.0 to 39.9 in adult  - Ongoing lifestyle modifications.     Patient's questions answered. Plan reviewed with patient at the end of visit. Relevant precautions to chief complaint and reasons to seek further medical care or to contact the office sooner reviewed with patient.     Follow up in about 1 year (around 8/25/2024) for Annual Exam.

## 2023-08-29 DIAGNOSIS — D72.819 LEUKOPENIA, UNSPECIFIED TYPE: Primary | ICD-10-CM

## 2023-10-30 ENCOUNTER — OFFICE VISIT (OUTPATIENT)
Dept: OTOLARYNGOLOGY | Facility: CLINIC | Age: 28
End: 2023-10-30
Payer: COMMERCIAL

## 2023-10-30 DIAGNOSIS — J30.9 ALLERGIC RHINITIS, UNSPECIFIED SEASONALITY, UNSPECIFIED TRIGGER: ICD-10-CM

## 2023-10-30 DIAGNOSIS — H68.012 ACUTE EUSTACHIAN SALPINGITIS, LEFT: Primary | ICD-10-CM

## 2023-10-30 PROCEDURE — 99202 OFFICE O/P NEW SF 15 MIN: CPT | Mod: 95,,, | Performed by: OTOLARYNGOLOGY

## 2023-10-30 PROCEDURE — 3044F PR MOST RECENT HEMOGLOBIN A1C LEVEL <7.0%: ICD-10-PCS | Mod: CPTII,95,, | Performed by: OTOLARYNGOLOGY

## 2023-10-30 PROCEDURE — 99202 PR OFFICE/OUTPT VISIT, NEW, LEVL II, 15-29 MIN: ICD-10-PCS | Mod: 95,,, | Performed by: OTOLARYNGOLOGY

## 2023-10-30 PROCEDURE — 3044F HG A1C LEVEL LT 7.0%: CPT | Mod: CPTII,95,, | Performed by: OTOLARYNGOLOGY

## 2023-10-30 NOTE — PATIENT INSTRUCTIONS
Patient to use low dose Sudafed OTC during the day, lots of saline and Flonase etc until symptoms are totally gone (maybe a week).  Oral steroids may be needed if not improving.      Passive insufflation of the ears as shown.    Routine use of Afrin with Flying ONLY not for regular use.  Nasal steroids up to year round for allergy control.  Oral antihistamines for milder more infrequent allergy concerns.    General instructions on Afrin, saline and nasal steroids.    Office visit and audiogram if not resolving.    Return with any worsening of symptoms, failure to improve, or any other concerns for further evaluation and treatment.      Voice recognition software was used in the creation of this note/communication and any sound-alike errors which may have occurred from its use should be taken in context when interpreting.  If such errors prevent a clear understanding of the note/communication, please contact the office for clarification.        AFRIN/OXYMETAZOLINE NASAL DECONGESTANT REGIMEN    Afrin decongestant nasal spray 2 sprays towards the ear each side twice daily for 3 days, stop for 1 day, return for 3 more days then discontinue use entirely.    NASAL SALINE    Still saline comes in many preparations including sprays/mists, gels, and rinses.  Different preparations served different purposes.  Saline spray helps to briefly moisturize the nose and help clear mucus.  Saline gels coat the nose for longer protective benefit of keeping the linings the nose moist.  Saline rinses clear the nose and sinuses and a more thorough way in her best used for significant postnasal drip and sinus complaints.  A combination of saline sprays/mists, gels and rinses should be used to address routine nasal clearing and dryness issues as well as flushing for better control of allergy and postnasal drip symptoms.  There is no real risk of over use of nasal saline products.  Saline sprays do not have any of the potential rebound or  addiction of nasal decongestant sprays.  Nasal saline sprays and rinses should be used prior to the application of any medicated nasal sprays such as nasal steroids or nasal antihistamine sprays.      INTRANASAL STEROID SPRAYS      Intranasal steroid sprays are available both by prescription and over-the-counter both in generic and brand name preparations.  They are all very similar in efficacy and side effect profiles.  Over-the-counter and prescription intranasal steroids include fluticasone propionate (Flonase), fluticasone furoate (Sensimist), triamcinolone (Nasacort), and budesonide (Rhinocort).  While these medications are available as prescriptions as well there are few nasal steroids in her available by prescription only and include mometasone (Nasonex), flunisolide (Nasarel), and beclomethasone (QNASL).    Nasal steroids or the foundation of treatment of both allergy and other inflammatory conditions of the nose and sinuses.  They are safe for regular use and while there are many side effects listed most of these are steroid class effects and not typically encountered.  Typical side effects include dryness and even ulceration and bleeding of the nose.  These side effects can be minimized by proper application and proper moisturization with saline and saline gel.    Sometimes changing between 1 brand of nasal steroid and another can result in improved control of symptoms especially after long term use of one particular nasal steroid.    Proper application of the nasal steroid spray is accomplished by spraying towards the I/ear on the same side with the tip of the superior just barely inside the nostril with the chin slightly downward.  Any dripping should be gently inhaled not sniff test backwards into the throat.  Labeled instructions should be followed.    ANTIHISTAMINES    Antihistamines can be beneficial in the management of allergic and nonallergic nasal symptoms (rhinitis).  Antihistamines come in 2  categories 1st generation and 2nd generation.  First generation antihistamines include medications like diphenhydramine (Benadryl) and chlorpheniramine.  These medications while affective in blocking the histamine receptor and preventing the affects of histamine (itching, sneezing and beginning the process of congestion) they also by 2 other receptors resulting in side effects of dryness which maybe beneficial but also sedation.  Second-generation antihistamines (loratadine/Claritin, Clarinex, fexofenadine/Allegra, Zyrtec/cetirizine and Xyzal/levo cetirizine) are just as effective if not more effective at blocking histamine but without the side effects of dryness and sedation.  The 1st generation antihistamines have a role better generally discouraged because of the sedation side effects which can be bothersome town right dangerous including the operation of heavy equipment motor vehicles.  Antihistamines also come in nasal form (azelastine/Astelin/Astepro and olopatadine/PATANASE).  These are 1st generation but are not taken by mouth so the side effects tend to be those of less sedation and maybe some favorable drying of nasal secretions.  They have a supportive role in the management of nasal symptoms.

## 2023-10-30 NOTE — PROGRESS NOTES
The patient location is: [place]  The chief complaint leading to consultation is: left ear fullness/popping    Visit type: audiovisual    Notes:    Subjective:    HPI: 28 y.o. female with pertinent PMHx of subjective allergy with a few weeks of ear fullness improving but mildly persistent following two recent flights the later while with allergy/sinus flare up.  Improving but still some occasional left ear popping.  No pain or active sinus complaints.      Objective:       No data to display                Physical Exam:  Resp- No increased WOB noted. Not using accessory muscles.      Assessment/Plan:    Patient to use low dose Sudafed OTC during the day, lots of saline and Flonase etc until symptoms are totally gone (maybe a week).  Oral steroids may be needed if not improving.      Passive insufflation of the ears as shown.    Routine use of Afrin with Flying ONLY not for regular use.  Nasal steroids up to year round for allergy control.  Oral antihistamines for milder more infrequent allergy concerns.    General instructions on Afrin, saline and nasal steroids.    Office visit and audiogram if not resolving.    Return with any worsening of symptoms, failure to improve, or any other concerns for further evaluation and treatment.      Voice recognition software was used in the creation of this note/communication and any sound-alike errors which may have occurred from its use should be taken in context when interpreting.  If such errors prevent a clear understanding of the note/communication, please contact the office for clarification.    Face to Face time with patient: 15  minutes of total time spent on the encounter, which includes face to face time and non-face to face time preparing to see the patient (eg, review of tests), Obtaining and/or reviewing separately obtained history, Documenting clinical information in the electronic or other health record, Independently interpreting results (not separately reported)  and communicating results to the patient/family/caregiver, or Care coordination (not separately reported).     Each patient to whom he or she provides medical services by telemedicine is:  (1) informed of the relationship between the physician and patient and the respective role of any other health care provider with respect to management of the patient; and (2) notified that he or she may decline to receive medical services by telemedicine and may withdraw from such care at any time.

## 2024-01-08 ENCOUNTER — TELEPHONE (OUTPATIENT)
Dept: ORTHOPEDICS | Facility: CLINIC | Age: 29
End: 2024-01-08
Payer: COMMERCIAL

## 2024-01-08 ENCOUNTER — PATIENT MESSAGE (OUTPATIENT)
Dept: ADMINISTRATIVE | Facility: OTHER | Age: 29
End: 2024-01-08
Payer: COMMERCIAL

## 2024-01-08 ENCOUNTER — HOSPITAL ENCOUNTER (OUTPATIENT)
Dept: RADIOLOGY | Facility: HOSPITAL | Age: 29
Discharge: HOME | End: 2024-01-08
Attending: PHYSICIAN ASSISTANT
Payer: COMMERCIAL

## 2024-01-08 ENCOUNTER — OFFICE VISIT (OUTPATIENT)
Dept: ORTHOPEDICS | Facility: CLINIC | Age: 29
End: 2024-01-08
Payer: COMMERCIAL

## 2024-01-08 VITALS — HEIGHT: 68 IN | BODY MASS INDEX: 39.42 KG/M2 | WEIGHT: 260.13 LBS

## 2024-01-08 DIAGNOSIS — S82.842A BIMALLEOLAR ANKLE FRACTURE, LEFT, CLOSED, INITIAL ENCOUNTER: ICD-10-CM

## 2024-01-08 DIAGNOSIS — S82.842A BIMALLEOLAR ANKLE FRACTURE, LEFT, CLOSED, INITIAL ENCOUNTER: Primary | ICD-10-CM

## 2024-01-08 PROCEDURE — 99999 PR PBB SHADOW E&M-EST. PATIENT-LVL III: CPT | Mod: PBBFAC,,, | Performed by: PHYSICIAN ASSISTANT

## 2024-01-08 PROCEDURE — 73610 X-RAY EXAM OF ANKLE: CPT | Mod: 26,LT,, | Performed by: RADIOLOGY

## 2024-01-08 PROCEDURE — 99213 OFFICE O/P EST LOW 20 MIN: CPT | Mod: S$GLB,,, | Performed by: PHYSICIAN ASSISTANT

## 2024-01-08 PROCEDURE — 1159F MED LIST DOCD IN RCRD: CPT | Mod: CPTII,S$GLB,, | Performed by: PHYSICIAN ASSISTANT

## 2024-01-08 PROCEDURE — 73610 X-RAY EXAM OF ANKLE: CPT | Mod: TC,LT

## 2024-01-08 PROCEDURE — 3008F BODY MASS INDEX DOCD: CPT | Mod: CPTII,S$GLB,, | Performed by: PHYSICIAN ASSISTANT

## 2024-01-08 NOTE — TELEPHONE ENCOUNTER
Spoke with pt. Pt missed appointment 1/08/24. Pt is reschedule to 1/11/24. Patient states verbal understanding and has no further questions.

## 2024-01-08 NOTE — PROGRESS NOTES
Principal Orthopedic Problem:  Left bimalleolar ankle fracture (lateral and posterior malleoli)                          Status post left patellar dislocation     Relevant Medical History: according to chart    PMHX:  healthy     HPI: Ms. Lowery is  a 27 year old female who presented to the Ed on 12/30/22 with left knee and ankle pain after falling.   RADS:   KNEE: dislocated left patella    Reduced at bedside, knee brace    ANKLE:  minimally displaced lateral malleolar fracture, minimally displaced posterior malleolar fracture, and subtle medial clear space widening.  Normal alignment of the tibiotalar joint.  Stress views of the ankle obtained with bedside fluoroscopy showed widening of the medial gutter with external rotation stress.    01/06/23: :   Open reduction internal fixation left bimalleolar ankle fracture - 57743    Examination under anesthesia left knee    Ms. Lowery is here today for a post-operative visit    Interval History:  she reports that she is doing ok.   she is at home . she is  participating in PT/OT.   Pain is controled.  she is  taking pain medication.    she denies fever, chills, and sweats .     Complications since time of surgery: none     Physical exam:    Patient arrives to exam room: walked in unassisted Patient is  unaccompanied family member    Ankle  Incision is clean, dry and intact.    Healing well no signs of breakdown or infection.       RADS: All pertinent images were reviewed by myself:   ANKLE:Postoperative changes of internal fixation of the distal fibula and tibia identified.  The position and alignment is satisfactory and unchanged as compared to the previous study     Assessment:  Post-op visit ( 1 year)    Plan:  Current care, treatment plan, precautions, activity level/ modifications, limitations, rehabilitation exercises and proposed future treatment were discussed with the patient. We discussed the need to monitor for changes in symptoms and condition and  report them to the physician.  Discussed importance of compliance with all appointments and follow up examinations.     WOUND CARE ORDERS  - none wel healed         ACTIVITY:   - slowly increase  activity as toelrated  ANKLE:    - ROMAT   - weight bearing as tolerated      -PT/OT, [facility]  , Patient is responsible to establish and continue care      PAIN MEDICATION:   - OTC as needed  - ice and elevation to reduce pain and swelling     DVT PROPHYLAXIS:   - ASA 81 mg bid: post op regime completed     FOLLOW UP:   - Patient will follow up in the clinic if any issues  - X-ray of her knee 2 view AP lat and ankle NWB OOB is needed.          If there are any questions prior to scheduled follow up, the patient was instructed to contact the office

## 2024-03-18 DIAGNOSIS — J45.20 MILD INTERMITTENT ASTHMA WITHOUT COMPLICATION: ICD-10-CM

## 2024-03-18 RX ORDER — ALBUTEROL SULFATE 90 UG/1
2 AEROSOL, METERED RESPIRATORY (INHALATION) EVERY 6 HOURS PRN
Qty: 54 G | Refills: 1 | Status: SHIPPED | OUTPATIENT
Start: 2024-03-18

## 2024-03-18 NOTE — TELEPHONE ENCOUNTER
Refill Decision Note   Magda Lowery  is requesting a refill authorization.  Brief Assessment and Rationale for Refill:  Approve     Medication Therapy Plan:         Comments:     Note composed:2:19 PM 03/18/2024

## 2024-03-18 NOTE — TELEPHONE ENCOUNTER
No care due was identified.  [facility] Embedded Care Due Messages. Reference number: [id number].   3/18/2024 1:58:58 PM CDT

## 2024-03-20 ENCOUNTER — OFFICE VISIT (OUTPATIENT)
Dept: INTERNAL MEDICINE | Facility: CLINIC | Age: 29
End: 2024-03-20
Payer: COMMERCIAL

## 2024-03-20 VITALS
HEART RATE: 102 BPM | OXYGEN SATURATION: 98 % | SYSTOLIC BLOOD PRESSURE: 116 MMHG | WEIGHT: 274.06 LBS | BODY MASS INDEX: 41.53 KG/M2 | HEIGHT: 68 IN | DIASTOLIC BLOOD PRESSURE: 76 MMHG

## 2024-03-20 DIAGNOSIS — B96.89 ACUTE BACTERIAL SINUSITIS: ICD-10-CM

## 2024-03-20 DIAGNOSIS — J45.20 MILD INTERMITTENT ASTHMA WITHOUT COMPLICATION: Primary | ICD-10-CM

## 2024-03-20 DIAGNOSIS — J45.901 ASTHMA WITH ACUTE EXACERBATION, UNSPECIFIED ASTHMA SEVERITY, UNSPECIFIED WHETHER PERSISTENT: ICD-10-CM

## 2024-03-20 DIAGNOSIS — J01.90 ACUTE BACTERIAL SINUSITIS: ICD-10-CM

## 2024-03-20 PROCEDURE — 1159F MED LIST DOCD IN RCRD: CPT | Mod: CPTII,S$GLB,, | Performed by: PHYSICIAN ASSISTANT

## 2024-03-20 PROCEDURE — 3008F BODY MASS INDEX DOCD: CPT | Mod: CPTII,S$GLB,, | Performed by: PHYSICIAN ASSISTANT

## 2024-03-20 PROCEDURE — 3074F SYST BP LT 130 MM HG: CPT | Mod: CPTII,S$GLB,, | Performed by: PHYSICIAN ASSISTANT

## 2024-03-20 PROCEDURE — 3078F DIAST BP <80 MM HG: CPT | Mod: CPTII,S$GLB,, | Performed by: PHYSICIAN ASSISTANT

## 2024-03-20 PROCEDURE — 99999 PR PBB SHADOW E&M-EST. PATIENT-LVL III: CPT | Mod: PBBFAC,,, | Performed by: PHYSICIAN ASSISTANT

## 2024-03-20 PROCEDURE — 99214 OFFICE O/P EST MOD 30 MIN: CPT | Mod: S$GLB,,, | Performed by: PHYSICIAN ASSISTANT

## 2024-03-20 RX ORDER — SODIUM CHLORIDE FOR INHALATION 0.9 %
3 VIAL, NEBULIZER (ML) INHALATION
Qty: 15 ML | Refills: 12 | Status: SHIPPED | OUTPATIENT
Start: 2024-03-20 | End: 2025-03-20

## 2024-03-20 RX ORDER — DOXYCYCLINE 100 MG/1
100 CAPSULE ORAL 2 TIMES DAILY
Qty: 14 CAPSULE | Refills: 0 | Status: SHIPPED | OUTPATIENT
Start: 2024-03-20 | End: 2024-03-27

## 2024-03-20 RX ORDER — CETIRIZINE HYDROCHLORIDE 10 MG/1
10 TABLET ORAL DAILY
Qty: 30 TABLET | Refills: 2 | Status: SHIPPED | OUTPATIENT
Start: 2024-03-20 | End: 2024-06-18

## 2024-03-20 RX ORDER — PROMETHAZINE HYDROCHLORIDE AND DEXTROMETHORPHAN HYDROBROMIDE 6.25; 15 MG/5ML; MG/5ML
5 SYRUP ORAL EVERY 4 HOURS PRN
Qty: 118 ML | Refills: 0 | Status: SHIPPED | OUTPATIENT
Start: 2024-03-20 | End: 2024-03-30

## 2024-03-20 RX ORDER — METHYLPREDNISOLONE 4 MG/1
TABLET ORAL
Qty: 21 EACH | Refills: 0 | Status: SHIPPED | OUTPATIENT
Start: 2024-03-20 | End: 2024-04-10

## 2024-03-20 RX ORDER — BENZONATATE 100 MG/1
100 CAPSULE ORAL 3 TIMES DAILY PRN
Qty: 30 CAPSULE | Refills: 0 | Status: SHIPPED | OUTPATIENT
Start: 2024-03-20 | End: 2024-03-30

## 2024-03-20 RX ORDER — FLUTICASONE PROPIONATE 50 MCG
1 SPRAY, SUSPENSION (ML) NASAL DAILY
Qty: 11.1 ML | Refills: 0 | Status: SHIPPED | OUTPATIENT
Start: 2024-03-20 | End: 2024-04-19

## 2024-03-20 NOTE — PROGRESS NOTES
INTERNAL MEDICINE URGENT VISIT NOTE    CHIEF COMPLAINT     Chief Complaint   Patient presents with    Cough       HPI     Magda Lowery is a 28 y.o. female who presents for an urgent visit today.    PCP is La Nena Ramirez MD, patient is new to me.     Patient presents with complaints of cough and congestion that has been present for about three weeks.     Three weeks ago started with nasal congestion and cold symptoms - improve with OTC meds but cough has persisted.  Taking Delsym and mucinex regularly - this is not helping symptoms.   No fevers   Wheezing with coughs -history of asthma, has been using albuterol inhaler, helping some.   Using neb at night   Denies SOB or CP  No hemoptysis  No history of PE  No leg pain or swelling       Past Medical History:  Past Medical History:   Diagnosis Date    Asthma     Bimalleolar ankle fracture, left, closed, initial encounter 12/29/2022    Hypothyroidism, unspecified        Home Medications:  Prior to Admission medications    Medication Sig Start Date End Date Taking? Authorizing Provider   albuterol (PROVENTIL/VENTOLIN HFA) 90 mcg/actuation inhaler Inhale 2 puffs into the lungs every 6 (six) hours as needed for Wheezing or Shortness of Breath. Rescue 3/18/24  Yes La Nena Ramirez MD       Review of Systems:  Review of Systems   Constitutional:  Negative for chills and fever.   HENT:  Positive for congestion. Negative for sore throat and trouble swallowing.    Eyes:  Negative for visual disturbance.   Respiratory:  Positive for cough and wheezing. Negative for shortness of breath.    Cardiovascular:  Negative for chest pain.   Gastrointestinal:  Negative for abdominal pain, constipation, diarrhea, nausea and vomiting.   Genitourinary:  Negative for dysuria and flank pain.   Musculoskeletal:  Negative for back pain, neck pain and neck stiffness.   Skin:  Negative for rash.   Neurological:  Negative for dizziness, syncope, weakness and headaches.   Psychiatric/Behavioral:   "Negative for confusion.        Health Maintainence:   Immunizations:  Health Maintenance         Date Due Completion Date    COVID-19 Vaccine (7 - 2023-24 season) 09/01/2023 12/24/2021    Pap Smear 03/02/2025 3/2/2022    TETANUS VACCINE 05/01/2028 5/1/2018             PHYSICAL EXAM     /76   Pulse 102   Ht 5' 8" (1.727 m)   Wt 124.3 kg (274 lb 0.5 oz)   SpO2 98%   BMI 41.67 kg/m²     Physical Exam  Vitals and nursing note reviewed.   Constitutional:       Appearance: Normal appearance.      Comments: Healthy appearing female in NAD or apparent pain. She makes good eye contact, speaks in clear full sentences and ambulates with ease.        HENT:      Head: Normocephalic and atraumatic.      Comments: HEENT inflammation on exam      Nose: Nose normal.      Mouth/Throat:      Pharynx: Oropharynx is clear.   Eyes:      Conjunctiva/sclera: Conjunctivae normal.   Cardiovascular:      Rate and Rhythm: Normal rate and regular rhythm.      Pulses: Normal pulses.   Pulmonary:      Effort: No respiratory distress.      Comments: Occasional wheezing in lung fields   Abdominal:      Tenderness: There is no abdominal tenderness.   Musculoskeletal:         General: Normal range of motion.      Cervical back: No rigidity.   Skin:     General: Skin is warm and dry.      Capillary Refill: Capillary refill takes less than 2 seconds.      Findings: No rash.   Neurological:      General: No focal deficit present.      Mental Status: She is alert.      Gait: Gait normal.   Psychiatric:         Mood and Affect: Mood normal.         LABS     Lab Results   Component Value Date    HGBA1C 5.3 08/25/2023     CMP  Sodium   Date Value Ref Range Status   08/25/2023 140 136 - 145 mmol/L Final     Potassium   Date Value Ref Range Status   08/25/2023 4.7 3.5 - 5.1 mmol/L Final     Chloride   Date Value Ref Range Status   08/25/2023 107 95 - 110 mmol/L Final     CO2   Date Value Ref Range Status   08/25/2023 26 23 - 29 mmol/L Final "     Glucose   Date Value Ref Range Status   08/25/2023 88 70 - 110 mg/dL Final     BUN   Date Value Ref Range Status   08/25/2023 10 6 - 20 mg/dL Final     Creatinine   Date Value Ref Range Status   08/25/2023 0.7 0.5 - 1.4 mg/dL Final     Calcium   Date Value Ref Range Status   08/25/2023 9.4 8.7 - 10.5 mg/dL Final     Total Protein   Date Value Ref Range Status   08/25/2023 7.4 6.0 - 8.4 g/dL Final     Albumin   Date Value Ref Range Status   08/25/2023 3.8 3.5 - 5.2 g/dL Final   08/25/2023 3.8 3.5 - 5.2 g/dL Final     Total Bilirubin   Date Value Ref Range Status   08/25/2023 0.3 0.1 - 1.0 mg/dL Final     Comment:     For infants and newborns, interpretation of results should be based  on gestational age, weight and in agreement with clinical  observations.    Premature Infant recommended reference ranges:  Up to 24 hours.............<8.0 mg/dL  Up to 48 hours............<12.0 mg/dL  3-5 days..................<15.0 mg/dL  6-29 days.................<15.0 mg/dL       Alkaline Phosphatase   Date Value Ref Range Status   08/25/2023 83 55 - 135 U/L Final     AST   Date Value Ref Range Status   08/25/2023 14 10 - 40 U/L Final     ALT   Date Value Ref Range Status   08/25/2023 10 10 - 44 U/L Final     Anion Gap   Date Value Ref Range Status   08/25/2023 7 (L) 8 - 16 mmol/L Final     eGFR if    Date Value Ref Range Status   01/05/2021 >60.0 >60 mL/min/1.73 m^2 Final     eGFR if non    Date Value Ref Range Status   01/05/2021 >60.0 >60 mL/min/1.73 m^2 Final     Comment:     Calculation used to obtain the estimated glomerular filtration  rate (eGFR) is the CKD-EPI equation.        Lab Results   Component Value Date    WBC 2.73 (L) 08/25/2023    HGB 13.2 08/25/2023    HCT 42.0 08/25/2023    MCV 95 08/25/2023     08/25/2023     Lab Results   Component Value Date    CHOL 184 08/25/2023    CHOL 172 01/05/2021     Lab Results   Component Value Date    HDL 60 08/25/2023    HDL 64 01/05/2021      Lab Results   Component Value Date    LDLCALC 117.2 08/25/2023    LDLCALC 102.0 01/05/2021     Lab Results   Component Value Date    TRIG 34 08/25/2023    TRIG 30 01/05/2021     Lab Results   Component Value Date    CHOLHDL 32.6 08/25/2023    CHOLHDL 37.2 01/05/2021     Lab Results   Component Value Date    TSH 7.156 (H) 08/25/2023       ASSESSMENT/PLAN     Magda Lowery is a 28 y.o. female     Magda was seen today for acute sinusitis triggering asthma exacerbation. Meds listed below. Patient is aware of ED prompts.     Diagnoses and all orders for this visit:    Mild intermittent asthma without complication    Asthma with acute exacerbation, unspecified asthma severity, unspecified whether persistent    Acute bacterial sinusitis    Other orders  -     doxycycline (MONODOX) 100 MG capsule; Take 1 capsule (100 mg total) by mouth 2 (two) times daily. for 7 days  -     methylPREDNISolone (MEDROL DOSEPACK) 4 mg tablet; use as directed  -     fluticasone propionate (FLONASE) 50 mcg/actuation nasal spray; 1 spray (50 mcg total) by Each Nostril route once daily.  -     cetirizine (ZYRTEC) 10 MG tablet; Take 1 tablet (10 mg total) by mouth once daily.  -     promethazine-dextromethorphan (PROMETHAZINE-DM) 6.25-15 mg/5 mL Syrp; Take 5 mLs by mouth every 4 (four) hours as needed (cough).  -     benzonatate (TESSALON) 100 MG capsule; Take 1 capsule (100 mg total) by mouth 3 (three) times daily as needed for Cough.  -     sodium chloride for inhalation (SODIUM CHLORIDE 0.9%) 0.9 % nebulizer solution; Take 3 mLs by nebulization as needed (cough).    Patient was counseled on when and how to seek emergent care.     Patient interview, exam, chart check, documentation required 35 mins of time.     Michelle Jin PA-C   Department of Internal Medicine - [facility]   8:03 AM

## 2024-04-19 RX ORDER — FLUTICASONE PROPIONATE 50 MCG
SPRAY, SUSPENSION (ML) NASAL
Qty: 16 G | Refills: 0 | Status: SHIPPED | OUTPATIENT
Start: 2024-04-19

## 2024-05-03 ENCOUNTER — PATIENT MESSAGE (OUTPATIENT)
Dept: FAMILY MEDICINE | Facility: CLINIC | Age: 29
End: 2024-05-03
Payer: COMMERCIAL

## 2024-05-06 ENCOUNTER — PATIENT MESSAGE (OUTPATIENT)
Dept: FAMILY MEDICINE | Facility: CLINIC | Age: 29
End: 2024-05-06
Payer: COMMERCIAL

## 2024-05-15 ENCOUNTER — OFFICE VISIT (OUTPATIENT)
Dept: FAMILY MEDICINE | Facility: CLINIC | Age: 29
End: 2024-05-15
Payer: COMMERCIAL

## 2024-05-15 ENCOUNTER — PATIENT MESSAGE (OUTPATIENT)
Dept: FAMILY MEDICINE | Facility: CLINIC | Age: 29
End: 2024-05-15

## 2024-05-15 DIAGNOSIS — N91.1 SECONDARY AMENORRHEA: Primary | ICD-10-CM

## 2024-05-15 PROCEDURE — 1160F RVW MEDS BY RX/DR IN RCRD: CPT | Mod: CPTII,95,, | Performed by: FAMILY MEDICINE

## 2024-05-15 PROCEDURE — 1159F MED LIST DOCD IN RCRD: CPT | Mod: CPTII,95,, | Performed by: FAMILY MEDICINE

## 2024-05-15 PROCEDURE — 99213 OFFICE O/P EST LOW 20 MIN: CPT | Mod: 95,,, | Performed by: FAMILY MEDICINE

## 2024-05-15 NOTE — PROGRESS NOTES
Subjective:         Patient ID: Magda Lowery is a 28 y.o. female.    Chief Complaint: No chief complaint on file.    Patient Active Problem List   Diagnosis    Thyroid nodule    Mild intermittent asthma without complication    Subclinical hypothyroidism    Multinodular thyroid    Cystic thyroid nodule    History of fracture of left ankle s/p surgery    Class 2 severe obesity due to excess calories with serious comorbidity and body mass index (BMI) of 39.0 to 39.9 in adult    Leukopenia      HPI    Magda is a 28 y.o. female who presents today for missed periods.     Last sexually active 12/9/2023 - condom used.  Had a period in Dec, Jan, and Feb. No March or April period. 05/10/2024 started period, feels like usual period.     Only real change is that she has been walking more.   Not on any contraception.     Review of Systems   Constitutional:  Positive for activity change. Negative for unexpected weight change.   HENT:  Negative for hearing loss, rhinorrhea and trouble swallowing.    Eyes:  Negative for discharge and visual disturbance.   Respiratory:  Negative for chest tightness and wheezing.    Cardiovascular:  Negative for chest pain and palpitations.   Gastrointestinal:  Negative for blood in stool, constipation, diarrhea and vomiting.   Endocrine: Negative for polydipsia and polyuria.   Genitourinary:  Positive for menstrual problem. Negative for difficulty urinating, dysuria and hematuria.   Musculoskeletal:  Negative for arthralgias, joint swelling and neck pain.   Neurological:  Negative for weakness and headaches.   Psychiatric/Behavioral:  Negative for confusion and dysphoric mood.    All other systems reviewed and are negative.       Objective:   There were no vitals filed for this visit.      Physical Exam  Constitutional:       General: She is not in acute distress.     Appearance: She is well-developed. She is not diaphoretic.      Comments: Exam performed as able, but limited due to the inherent  nature of telemedicine visit.    HENT:      Head: Normocephalic and atraumatic.   Eyes:      Conjunctiva/sclera: Conjunctivae normal.   Pulmonary:      Effort: No respiratory distress.      Comments: No audible wheezing noted   Breathing as noted over telemedicine appears normal with no distress  Skin:     Comments: No visible rash noted   Neurological:      Mental Status: She is alert and oriented to person, place, and time.   Psychiatric:         Behavior: Behavior normal.         Thought Content: Thought content normal.         Judgment: Judgment normal.       Assessment:       1. Secondary amenorrhea          Plan:   Recent relevant labs results reviewed with patient.         1. Secondary amenorrhea  -     TSH; Future; Expected date: 05/15/2024  -     Prolactin; Future; Expected date: 05/15/2024  -     FOLLICLE STIMULATING HORMONE; Future; Expected date: 05/15/2024  -     HCG, Quantitative; Future; Expected date: 05/15/2024  -     Estradiol; Future; Expected date: 05/15/2024    Duration short of formal definition of secondary amenorrhea, but period has resumed. Testing as above. If normal testing, continue to clinically observe in coming months. Patient not interested in any contraception at this time.   Continue to exercise. Possible physical activity change caused now resolved irregularity.     Patient's questions answered. Plan reviewed with patient at the end of visit. Relevant precautions to chief complaint and reasons to seek further medical care or to contact the office sooner reviewed with patient.     Follow up in about 3 months (around 8/15/2024) for Annual Exam, - already scheduled.      The patient location is: home, [place]  The chief complaint leading to consultation is: missed period    Visit type: audiovisual    Face to Face time with patient: 15  20 minutes of total time spent on the encounter, which includes face to face time and non-face to face time preparing to see the patient (eg, review of  tests), Obtaining and/or reviewing separately obtained history, Documenting clinical information in the electronic or other health record, Independently interpreting results (not separately reported) and communicating results to the patient/family/caregiver, or Care coordination (not separately reported).     Each patient to whom he or she provides medical services by telemedicine is:  (1) informed of the relationship between the physician and patient and the respective role of any other health care provider with respect to management of the patient; and (2) notified that he or she may decline to receive medical services by telemedicine and may withdraw from such care at any time.    Notes:

## 2024-05-20 ENCOUNTER — LAB VISIT (OUTPATIENT)
Dept: LAB | Facility: HOSPITAL | Age: 29
End: 2024-05-20
Attending: FAMILY MEDICINE
Payer: COMMERCIAL

## 2024-05-20 DIAGNOSIS — N91.1 SECONDARY AMENORRHEA: ICD-10-CM

## 2024-05-20 LAB
ESTRADIOL SERPL-MCNC: 61 PG/ML
FSH SERPL-ACNC: 6.9 MIU/ML
HCG INTACT+B SERPL-ACNC: <2.4 MIU/ML
PROLACTIN SERPL IA-MCNC: 14.4 NG/ML (ref 5.2–26.5)
T4 FREE SERPL-MCNC: 0.75 NG/DL (ref 0.71–1.51)
TSH SERPL DL<=0.005 MIU/L-ACNC: 7.95 UIU/ML (ref 0.4–4)

## 2024-05-20 PROCEDURE — 84146 ASSAY OF PROLACTIN: CPT | Performed by: FAMILY MEDICINE

## 2024-05-20 PROCEDURE — 36415 COLL VENOUS BLD VENIPUNCTURE: CPT | Mod: PO | Performed by: FAMILY MEDICINE

## 2024-05-20 PROCEDURE — 84702 CHORIONIC GONADOTROPIN TEST: CPT | Performed by: FAMILY MEDICINE

## 2024-05-20 PROCEDURE — 82670 ASSAY OF TOTAL ESTRADIOL: CPT | Performed by: FAMILY MEDICINE

## 2024-05-20 PROCEDURE — 84439 ASSAY OF FREE THYROXINE: CPT | Performed by: FAMILY MEDICINE

## 2024-05-20 PROCEDURE — 83001 ASSAY OF GONADOTROPIN (FSH): CPT | Performed by: FAMILY MEDICINE

## 2024-05-20 PROCEDURE — 84443 ASSAY THYROID STIM HORMONE: CPT | Performed by: FAMILY MEDICINE

## 2024-08-14 ENCOUNTER — OFFICE VISIT (OUTPATIENT)
Dept: URGENT CARE | Facility: CLINIC | Age: 29
End: 2024-08-14
Payer: COMMERCIAL

## 2024-08-14 VITALS
HEART RATE: 107 BPM | TEMPERATURE: 100 F | SYSTOLIC BLOOD PRESSURE: 110 MMHG | BODY MASS INDEX: 41.66 KG/M2 | OXYGEN SATURATION: 100 % | RESPIRATION RATE: 16 BRPM | DIASTOLIC BLOOD PRESSURE: 74 MMHG | WEIGHT: 274 LBS

## 2024-08-14 DIAGNOSIS — R55 SYNCOPE, UNSPECIFIED SYNCOPE TYPE: Primary | ICD-10-CM

## 2024-08-14 LAB
CTP QC/QA: YES
GLUCOSE SERPL-MCNC: 84 MG/DL (ref 70–110)
OHS QRS DURATION: 74 MS
OHS QTC CALCULATION: 417 MS
SARS-COV-2 AG RESP QL IA.RAPID: NEGATIVE

## 2024-08-14 PROCEDURE — 99214 OFFICE O/P EST MOD 30 MIN: CPT | Mod: S$GLB,,, | Performed by: NURSE PRACTITIONER

## 2024-08-14 PROCEDURE — 93005 ELECTROCARDIOGRAM TRACING: CPT | Mod: S$GLB,,, | Performed by: NURSE PRACTITIONER

## 2024-08-14 PROCEDURE — 93010 ELECTROCARDIOGRAM REPORT: CPT | Mod: S$GLB,,, | Performed by: STUDENT IN AN ORGANIZED HEALTH CARE EDUCATION/TRAINING PROGRAM

## 2024-08-14 PROCEDURE — 87811 SARS-COV-2 COVID19 W/OPTIC: CPT | Mod: QW,S$GLB,, | Performed by: NURSE PRACTITIONER

## 2024-08-14 PROCEDURE — 82962 GLUCOSE BLOOD TEST: CPT | Mod: S$GLB,,, | Performed by: NURSE PRACTITIONER

## 2024-08-14 NOTE — PATIENT INSTRUCTIONS
Drink well: alternate electrolyte drinks with water until well  Eat well: especially protein  Rest   When changing positions, do so slowly  Follow up with primary care as needed

## 2024-08-14 NOTE — PROGRESS NOTES
Subjective:      Patient ID: Magda Lowery is a 28 y.o. female.    Vitals:  weight is 124.3 kg (274 lb). Her oral temperature is 99.8 °F (37.7 °C). Her blood pressure is 110/74 and her pulse is 107. Her respiration is 16 and oxygen saturation is 100%.     Chief Complaint: Loss of Consciousness    This is a 28 y.o. female who presents today with a chief complaint of syncopal episode yesterday- post abdominal-cramping. (Pt due for period any day.)   Pt states that after sitting on toilet to void yesterday (no straining), when she stood up, she felt shallow breathing and weakness-then passed out.  LOC <1 minute per pt.  She had taken aleve for the cramps-prior.  Family called 911 and EMS arrived, but did not take her to ER, as she was then awake and alert. They told her she needed to follow up with her doctor for bloodwork.   Denies dysuria and abdominal pain. States there is no chance of pregnancy.  She feels much better today.   Has h/o syncopal event in past, with similar symptoms at that time, as well.    Home tx: Aleve; drinking water    [facility]: similar episode in 2/2023 post surgery    Loss of Consciousness  This is a new problem. The current episode started yesterday. The problem has been rapidly improving. She lost consciousness for a period of less than 1 minute. The symptoms are aggravated by standing and urination. Associated symptoms include abdominal pain and light-headedness. Pertinent negatives include no back pain, chest pain, fever, headaches, nausea or vomiting. There is no history of arrhythmia, HTN, seizures, TIA or vertigo.       Constitution: Negative for fever.   Cardiovascular:  Positive for passing out. Negative for chest pain.   Gastrointestinal:  Positive for abdominal pain. Negative for nausea and vomiting.   Musculoskeletal:  Negative for back pain.   Neurological:  Positive for light-headedness and passing out. Negative for facial drooping, speech difficulty, coordination disturbances and  headaches.      Objective:     Physical Exam   Constitutional: She is oriented to person, place, and time.  Non-toxic appearance. She does not appear ill. No distress.   HENT:   Head: Normocephalic.   Nose: Nose normal.   Mouth/Throat: Mucous membranes are moist. No oropharyngeal exudate or posterior oropharyngeal erythema. Oropharynx is clear.   Eyes: Right eye exhibits no discharge. Left eye exhibits no discharge. No scleral icterus. Extraocular movement intact   Neck: Neck supple. No neck rigidity present.   Cardiovascular: Normal rate, regular rhythm, normal heart sounds and normal pulses.   No murmur heard.  Pulmonary/Chest: Effort normal and breath sounds normal. No respiratory distress. She has no wheezes. She has no rhonchi.   Abdominal: Normal appearance. She exhibits no distension. There is no abdominal tenderness.   Musculoskeletal: Normal range of motion.         General: Normal range of motion.   Neurological: She is alert and oriented to person, place, and time.   Skin: Skin is warm and dry.   Psychiatric: Her behavior is normal. Mood normal.   Nursing note and vitals reviewed.    Results for orders placed or performed in visit on 08/14/24   SARS Coronavirus 2 Antigen, POCT Manual Read   Result Value Ref Range    SARS Coronavirus 2 Antigen Negative Negative     Acceptable Yes    POCT Glucose, Hand-Held Device   Result Value Ref Range    POC Glucose 84 70 - 110 MG/DL   IN OFFICE EKG 12-LEAD (to Muse)   Result Value Ref Range    QRS Duration 74 ms    [facility]HS QTC Calculation 417 ms        EKG: normal sinus rhythm     Assessment:     1. Syncope, unspecified syncope type        Plan:       Syncope, unspecified syncope type  -     SARS Coronavirus 2 Antigen, POCT Manual Read  -     Orthostatic vital signs  -     IN OFFICE EKG 12-LEAD (to Muse)  -     POCT Glucose, Hand-Held Device      Patient Instructions   Drink well: alternate electrolyte drinks with water until well  Eat well: especially  protein  Rest   When changing positions, do so slowly  Follow up with primary care as needed

## 2024-08-15 ENCOUNTER — PATIENT MESSAGE (OUTPATIENT)
Dept: FAMILY MEDICINE | Facility: CLINIC | Age: 29
End: 2024-08-15
Payer: COMMERCIAL

## 2024-08-17 ENCOUNTER — ON-DEMAND VIRTUAL (OUTPATIENT)
Dept: URGENT CARE | Facility: CLINIC | Age: 29
End: 2024-08-17
Payer: COMMERCIAL

## 2024-08-17 DIAGNOSIS — R09.81 NASAL CONGESTION: Primary | ICD-10-CM

## 2024-08-17 PROCEDURE — 99213 OFFICE O/P EST LOW 20 MIN: CPT | Mod: CC,95,, | Performed by: FAMILY MEDICINE

## 2024-08-17 RX ORDER — IPRATROPIUM BROMIDE 21 UG/1
2 SPRAY, METERED NASAL 2 TIMES DAILY
Qty: 30 ML | Refills: 0 | Status: SHIPPED | OUTPATIENT
Start: 2024-08-17 | End: 2024-08-24

## 2024-08-18 NOTE — PROGRESS NOTES
Subjective:      Patient ID: Magda Lowery is a 28 y.o. female.    Vitals:  vitals were not taken for this visit.     Chief Complaint: Nasal Congestion (Sinus pressure)      Visit Type: TELE AUDIOVISUAL    Present with the patient at the time of consultation: TELEMED PRESENT WITH PATIENT: None    Past Medical History:   Diagnosis Date    Asthma     Bimalleolar ankle fracture, left, closed, initial encounter 12/29/2022    Hypothyroidism, unspecified      Past Surgical History:   Procedure Laterality Date    OPEN REDUCTION AND INTERNAL FIXATION (ORIF) OF INJURY OF ANKLE Left 1/6/2023    Procedure: ORIF, ANKLE, LEFT;  Surgeon: Reid Polanco MD;  Location: [facility] OR [address] [place] [address];  Service: Orthopedics;  Laterality: Left;     Review of patient's allergies indicates:   Allergen Reactions    Cat dander Shortness Of Breath and Other (See Comments)     Asthma     Current Outpatient Medications on File Prior to Visit   Medication Sig Dispense Refill    albuterol (PROVENTIL/VENTOLIN HFA) 90 mcg/actuation inhaler Inhale 2 puffs into the lungs every 6 (six) hours as needed for Wheezing or Shortness of Breath. Rescue 54 g 1    cetirizine (ZYRTEC) 10 MG tablet Take 1 tablet (10 mg total) by mouth once daily. 30 tablet 2    fluticasone propionate (FLONASE) 50 mcg/actuation nasal spray SHAKE LIQUID AND USE 1 SPRAY(50 MCG) IN EACH NOSTRIL DAILY (Patient not taking: Reported on 8/14/2024) 16 g 0    sodium chloride for inhalation (SODIUM CHLORIDE 0.9%) 0.9 % nebulizer solution Take 3 mLs by nebulization as needed (cough). (Patient not taking: Reported on 8/14/2024) 15 mL 12     No current facility-administered medications on file prior to visit.     Family History   Problem Relation Name Age of Onset    Hypertension Mother      Hypertension Maternal Aunt      Diabetes Other Maternal great Aunt     Cancer Other Maternal Great Aunt        Medications Ordered                [id number] - [place] - [address] AT  [facility]   [address] [place] [address]    Telephone: [phone]   Fax: [phone]   Hours: Not open 24 hours                         E-Prescribed (1 of 1)              ipratropium (ATROVENT) 21 mcg (0.03 %) nasal spray    Si sprays by Each Nostril route 2 (two) times daily. for 7 days       Start: 24     Quantity: 30 mL Refills: 0                           Ohs Peq Odvv Intake    2024  9:11 PM CDT - Filed by Patient   What is your current physical address in the event of a medical emergency? [address] [place] [address]   Are you able to take your vital signs? No   Please attach any relevant images or files          27 yo female presents for nasal congestion.  2 weeks of symptoms  Took zyrtec and started mucinex today        HENT:  Positive for congestion.         Objective:   The physical exam was conducted virtually.  Physical Exam   Constitutional: She is oriented to person, place, and time.   Pulmonary/Chest: Effort normal. No respiratory distress.   Neurological: She is alert and oriented to person, place, and time.       Assessment:     1. Nasal congestion        Plan:       Nasal congestion  -     ipratropium (ATROVENT) 21 mcg (0.03 %) nasal spray; 2 sprays by Each Nostril route 2 (two) times daily. for 7 days  Dispense: 30 mL; Refill: 0      Patient is located at home.  Please continue over the counter decongestants

## 2024-08-23 ENCOUNTER — LAB VISIT (OUTPATIENT)
Dept: LAB | Facility: HOSPITAL | Age: 29
End: 2024-08-23
Attending: FAMILY MEDICINE
Payer: COMMERCIAL

## 2024-08-23 DIAGNOSIS — E03.8 SUBCLINICAL HYPOTHYROIDISM: ICD-10-CM

## 2024-08-23 DIAGNOSIS — Z00.01 ENCOUNTER FOR GENERAL ADULT MEDICAL EXAMINATION WITH ABNORMAL FINDINGS: ICD-10-CM

## 2024-08-23 LAB
ALBUMIN SERPL BCP-MCNC: 3.7 G/DL (ref 3.5–5.2)
ALBUMIN SERPL BCP-MCNC: 3.7 G/DL (ref 3.5–5.2)
ALP SERPL-CCNC: 78 U/L (ref 55–135)
ALT SERPL W/O P-5'-P-CCNC: 13 U/L (ref 10–44)
ANION GAP SERPL CALC-SCNC: 6 MMOL/L (ref 8–16)
AST SERPL-CCNC: 15 U/L (ref 10–40)
BASOPHILS # BLD AUTO: 0.03 K/UL (ref 0–0.2)
BASOPHILS NFR BLD: 0.8 % (ref 0–1.9)
BILIRUB DIRECT SERPL-MCNC: 0.1 MG/DL (ref 0.1–0.3)
BILIRUB SERPL-MCNC: 0.3 MG/DL (ref 0.1–1)
BUN SERPL-MCNC: 10 MG/DL (ref 6–20)
CALCIUM SERPL-MCNC: 9.1 MG/DL (ref 8.7–10.5)
CHLORIDE SERPL-SCNC: 106 MMOL/L (ref 95–110)
CHOLEST SERPL-MCNC: 179 MG/DL (ref 120–199)
CHOLEST/HDLC SERPL: 3 {RATIO} (ref 2–5)
CO2 SERPL-SCNC: 27 MMOL/L (ref 23–29)
CREAT SERPL-MCNC: 0.8 MG/DL (ref 0.5–1.4)
DIFFERENTIAL METHOD BLD: ABNORMAL
EOSINOPHIL # BLD AUTO: 0.3 K/UL (ref 0–0.5)
EOSINOPHIL NFR BLD: 7.1 % (ref 0–8)
ERYTHROCYTE [DISTWIDTH] IN BLOOD BY AUTOMATED COUNT: 13.1 % (ref 11.5–14.5)
EST. GFR  (NO RACE VARIABLE): >60 ML/MIN/1.73 M^2
ESTIMATED AVG GLUCOSE: 108 MG/DL (ref 68–131)
GLUCOSE SERPL-MCNC: 92 MG/DL (ref 70–110)
HBA1C MFR BLD: 5.4 % (ref 4–5.6)
HCT VFR BLD AUTO: 39.1 % (ref 37–48.5)
HDLC SERPL-MCNC: 60 MG/DL (ref 40–75)
HDLC SERPL: 33.5 % (ref 20–50)
HGB BLD-MCNC: 12.3 G/DL (ref 12–16)
IMM GRANULOCYTES # BLD AUTO: 0.01 K/UL (ref 0–0.04)
IMM GRANULOCYTES NFR BLD AUTO: 0.3 % (ref 0–0.5)
LDLC SERPL CALC-MCNC: 112.2 MG/DL (ref 63–159)
LYMPHOCYTES # BLD AUTO: 1.1 K/UL (ref 1–4.8)
LYMPHOCYTES NFR BLD: 29.1 % (ref 18–48)
MCH RBC QN AUTO: 29.6 PG (ref 27–31)
MCHC RBC AUTO-ENTMCNC: 31.5 G/DL (ref 32–36)
MCV RBC AUTO: 94 FL (ref 82–98)
MONOCYTES # BLD AUTO: 0.5 K/UL (ref 0.3–1)
MONOCYTES NFR BLD: 13.3 % (ref 4–15)
NEUTROPHILS # BLD AUTO: 1.8 K/UL (ref 1.8–7.7)
NEUTROPHILS NFR BLD: 49.4 % (ref 38–73)
NONHDLC SERPL-MCNC: 119 MG/DL
NRBC BLD-RTO: 0 /100 WBC
PHOSPHATE SERPL-MCNC: 2.9 MG/DL (ref 2.7–4.5)
PLATELET # BLD AUTO: 281 K/UL (ref 150–450)
PMV BLD AUTO: 9 FL (ref 9.2–12.9)
POTASSIUM SERPL-SCNC: 4.1 MMOL/L (ref 3.5–5.1)
PROT SERPL-MCNC: 7.3 G/DL (ref 6–8.4)
RBC # BLD AUTO: 4.15 M/UL (ref 4–5.4)
SODIUM SERPL-SCNC: 139 MMOL/L (ref 136–145)
T4 FREE SERPL-MCNC: 0.69 NG/DL (ref 0.71–1.51)
TRIGL SERPL-MCNC: 34 MG/DL (ref 30–150)
TSH SERPL DL<=0.005 MIU/L-ACNC: 6.98 UIU/ML (ref 0.4–4)
WBC # BLD AUTO: 3.68 K/UL (ref 3.9–12.7)

## 2024-08-23 PROCEDURE — 80069 RENAL FUNCTION PANEL: CPT | Performed by: FAMILY MEDICINE

## 2024-08-23 PROCEDURE — 84075 ASSAY ALKALINE PHOSPHATASE: CPT | Performed by: FAMILY MEDICINE

## 2024-08-23 PROCEDURE — 80061 LIPID PANEL: CPT | Performed by: FAMILY MEDICINE

## 2024-08-23 PROCEDURE — 85025 COMPLETE CBC W/AUTO DIFF WBC: CPT | Performed by: FAMILY MEDICINE

## 2024-08-23 PROCEDURE — 83036 HEMOGLOBIN GLYCOSYLATED A1C: CPT | Performed by: FAMILY MEDICINE

## 2024-08-23 PROCEDURE — 84439 ASSAY OF FREE THYROXINE: CPT | Performed by: FAMILY MEDICINE

## 2024-08-23 PROCEDURE — 84155 ASSAY OF PROTEIN SERUM: CPT | Performed by: FAMILY MEDICINE

## 2024-08-23 PROCEDURE — 84443 ASSAY THYROID STIM HORMONE: CPT | Performed by: FAMILY MEDICINE

## 2024-08-26 ENCOUNTER — OFFICE VISIT (OUTPATIENT)
Dept: FAMILY MEDICINE | Facility: CLINIC | Age: 29
End: 2024-08-26
Payer: COMMERCIAL

## 2024-08-26 VITALS
HEIGHT: 68 IN | HEART RATE: 105 BPM | DIASTOLIC BLOOD PRESSURE: 74 MMHG | SYSTOLIC BLOOD PRESSURE: 116 MMHG | OXYGEN SATURATION: 100 % | WEIGHT: 271.81 LBS | BODY MASS INDEX: 41.19 KG/M2

## 2024-08-26 DIAGNOSIS — Z00.01 ENCOUNTER FOR GENERAL ADULT MEDICAL EXAMINATION WITH ABNORMAL FINDINGS: Primary | ICD-10-CM

## 2024-08-26 DIAGNOSIS — N92.6 IRREGULAR PERIODS: ICD-10-CM

## 2024-08-26 DIAGNOSIS — E03.9 ACQUIRED HYPOTHYROIDISM: ICD-10-CM

## 2024-08-26 DIAGNOSIS — E04.2 MULTINODULAR THYROID: ICD-10-CM

## 2024-08-26 DIAGNOSIS — E66.01 CLASS 3 SEVERE OBESITY DUE TO EXCESS CALORIES WITH SERIOUS COMORBIDITY AND BODY MASS INDEX (BMI) OF 40.0 TO 44.9 IN ADULT: ICD-10-CM

## 2024-08-26 DIAGNOSIS — R00.0 SINUS TACHYCARDIA: ICD-10-CM

## 2024-08-26 DIAGNOSIS — R55 SYNCOPE AND COLLAPSE: ICD-10-CM

## 2024-08-26 DIAGNOSIS — Z23 IMMUNIZATION DUE: ICD-10-CM

## 2024-08-26 DIAGNOSIS — D72.819 LEUKOPENIA, UNSPECIFIED TYPE: ICD-10-CM

## 2024-08-26 DIAGNOSIS — J45.20 MILD INTERMITTENT ASTHMA WITHOUT COMPLICATION: ICD-10-CM

## 2024-08-26 PROBLEM — E04.1 CYSTIC THYROID NODULE: Status: RESOLVED | Noted: 2022-04-21 | Resolved: 2024-08-26

## 2024-08-26 PROBLEM — E66.813 CLASS 3 SEVERE OBESITY DUE TO EXCESS CALORIES WITH SERIOUS COMORBIDITY AND BODY MASS INDEX (BMI) OF 40.0 TO 44.9 IN ADULT: Status: ACTIVE | Noted: 2023-08-25

## 2024-08-26 PROCEDURE — 99999 PR PBB SHADOW E&M-EST. PATIENT-LVL III: CPT | Mod: PBBFAC,,, | Performed by: FAMILY MEDICINE

## 2024-08-26 PROCEDURE — 99213 OFFICE O/P EST LOW 20 MIN: CPT | Mod: 25,S$GLB,, | Performed by: FAMILY MEDICINE

## 2024-08-26 PROCEDURE — 1159F MED LIST DOCD IN RCRD: CPT | Mod: CPTII,S$GLB,, | Performed by: FAMILY MEDICINE

## 2024-08-26 PROCEDURE — 3044F HG A1C LEVEL LT 7.0%: CPT | Mod: CPTII,S$GLB,, | Performed by: FAMILY MEDICINE

## 2024-08-26 PROCEDURE — 3078F DIAST BP <80 MM HG: CPT | Mod: CPTII,S$GLB,, | Performed by: FAMILY MEDICINE

## 2024-08-26 PROCEDURE — 99395 PREV VISIT EST AGE 18-39: CPT | Mod: 25,S$GLB,, | Performed by: FAMILY MEDICINE

## 2024-08-26 PROCEDURE — 3074F SYST BP LT 130 MM HG: CPT | Mod: CPTII,S$GLB,, | Performed by: FAMILY MEDICINE

## 2024-08-26 PROCEDURE — 90471 IMMUNIZATION ADMIN: CPT | Mod: S$GLB,,, | Performed by: FAMILY MEDICINE

## 2024-08-26 PROCEDURE — 93005 ELECTROCARDIOGRAM TRACING: CPT | Mod: S$GLB,,, | Performed by: FAMILY MEDICINE

## 2024-08-26 PROCEDURE — 90677 PCV20 VACCINE IM: CPT | Mod: S$GLB,,, | Performed by: FAMILY MEDICINE

## 2024-08-26 PROCEDURE — 1160F RVW MEDS BY RX/DR IN RCRD: CPT | Mod: CPTII,S$GLB,, | Performed by: FAMILY MEDICINE

## 2024-08-26 PROCEDURE — 3008F BODY MASS INDEX DOCD: CPT | Mod: CPTII,S$GLB,, | Performed by: FAMILY MEDICINE

## 2024-08-26 PROCEDURE — 93010 ELECTROCARDIOGRAM REPORT: CPT | Mod: S$GLB,,, | Performed by: INTERNAL MEDICINE

## 2024-08-26 RX ORDER — LEVOTHYROXINE SODIUM 150 UG/1
150 TABLET ORAL
Qty: 90 TABLET | Refills: 0 | Status: SHIPPED | OUTPATIENT
Start: 2024-08-26 | End: 2024-08-29 | Stop reason: SDUPTHER

## 2024-08-26 NOTE — PROGRESS NOTES
"Subjective:         Patient ID: Magda Lowery is a 28 y.o. female.    Chief Complaint: Annual Exam    Patient Active Problem List   Diagnosis    Thyroid nodule    Mild intermittent asthma without complication    Acquired hypothyroidism    Multinodular thyroid    History of fracture of left ankle s/p surgery    Class 3 severe obesity due to excess calories with serious comorbidity and body mass index (BMI) of 40.0 to 44.9 in adult    Leukopenia      HPI    Magda is a 28 y.o. female who presents today for annual.     Prior amenorrhea x 2 months. Had period May, June, July.   Labs in May unremarkable. Subclinical hypothyroidism.   No sexual activity since last period.    Period due 08/08. Didn't come.   08/13 - lower abd cramping while driving home from work.   Took some Aleve and laid down once she got home. Went to urinate, then her breathing changed, she stood up to grab something to try and wipe herself. She didn't pull up pants yet, then was out. No head trauma. Woke up on floor.   < 1 min LOC, corroborated by mother in home. No post ictal period.  Woke up without any respiratory issues.  That day - did eat lunch, drank water, overall no changes.     EMS called - not transported. Vitals and glucose normal.     Urgent Care orthostatic BP and EKG WNL    Review of Systems   All other systems reviewed and are negative.       Objective:     Vitals:    08/26/24 1041   BP: 116/74   BP Location: Left arm   Patient Position: Sitting   BP Method: Large (Manual)   Pulse: 105   SpO2: 100%   Weight: 123.3 kg (271 lb 13.2 oz)   Height: 5' 8" (1.727 m)         Physical Exam  Vitals and nursing note reviewed.   Constitutional:       General: She is not in acute distress.     Appearance: Normal appearance. She is not ill-appearing, toxic-appearing or diaphoretic.   HENT:      Head: Normocephalic and atraumatic.   Eyes:      General: No scleral icterus.     Conjunctiva/sclera: Conjunctivae normal.   Cardiovascular:      Rate and " Rhythm: Normal rate.      Heart sounds: Normal heart sounds. No murmur heard.  Pulmonary:      Effort: Pulmonary effort is normal. No respiratory distress.      Breath sounds: Normal breath sounds.   Abdominal:      General: Bowel sounds are normal.   Skin:     Coloration: Skin is not pale.   Neurological:      Mental Status: She is alert. Mental status is at baseline.   Psychiatric:         Attention and Perception: Attention and perception normal.         Mood and Affect: Mood and affect normal.         Speech: Speech normal.         Behavior: Behavior normal.         Cognition and Memory: Cognition and memory normal.         Judgment: Judgment normal.       Assessment:       1. Encounter for general adult medical examination with abnormal findings    2. Syncope and collapse    3. Multinodular thyroid    4. Acquired hypothyroidism    5. Irregular periods    6. Leukopenia, unspecified type    7. Mild intermittent asthma without complication    8. Class 3 severe obesity due to excess calories with serious comorbidity and body mass index (BMI) of 40.0 to 44.9 in adult    9. Immunization due          Plan:   Recent relevant labs results reviewed with patient.         1. Encounter for general adult medical examination with abnormal findings  - Risk and age appropriate anticipatory guidance.  reviewed and updated. Recommendations discussed with patient as appropriate.     2. Syncope and collapse  Possible vasovagal/Orthostatic.   Orthostatic BP check normal in Urgent care.   No indication for further work up or monitoring. Labs reviewed.   Clinically monitor for recurrence.     3. Multinodular thyroid  4. Acquired hypothyroidism  -     Thyroid Peroxidase Antibody; Future; Expected date: 08/26/2024  -     TSH; Future; Expected date: 08/26/2024  Start on Levothyroxine 150 mcg (wt based, closer to 200 mcg) and recheck in 3 months. Start lower and will titrate up based on TSH. Thyroid still producing some.  US thyroid  reviewed - no follow up needed from prior US.   Ongoing subclinical hypothyroidism with now low T4, start treatment.     5. Irregular periods  Correct TSH and monitor. No excessive or heavy bleeding.     6. Leukopenia, unspecified type  Chronic, improved    7. Mild intermittent asthma without complication  Chronic, stable. Intermittent    8. Class 3 severe obesity due to excess calories with serious comorbidity and body mass index (BMI) of 40.0 to 44.9 in adult  Work on thyroid treatment.   Weight gain noted.     9. Immunization due  -     pneumoc 20-facundo conj-dip cr(PF) (PREVNAR-20 (PF)) injection Syrg 0.5 mL    Patient's questions answered. Plan reviewed with patient at the end of visit. Relevant precautions to chief complaint and reasons to seek further medical care or to contact the office sooner reviewed with patient.     Follow up in about 3 months (around 11/26/2024) for f/u thyroid (prelabs).        Part of this note was dictated using voice recognition software. Please excuse any typographical errors.

## 2024-08-27 ENCOUNTER — PATIENT MESSAGE (OUTPATIENT)
Dept: FAMILY MEDICINE | Facility: CLINIC | Age: 29
End: 2024-08-27
Payer: COMMERCIAL

## 2024-08-27 LAB
OHS QRS DURATION: 68 MS
OHS QTC CALCULATION: 422 MS

## 2024-08-29 DIAGNOSIS — E03.9 ACQUIRED HYPOTHYROIDISM: ICD-10-CM

## 2024-08-29 RX ORDER — LEVOTHYROXINE SODIUM 150 UG/1
150 TABLET ORAL
Qty: 90 TABLET | Refills: 0 | Status: SHIPPED | OUTPATIENT
Start: 2024-08-29

## 2024-08-29 NOTE — TELEPHONE ENCOUNTER
No care due was identified.  [facility] Embedded Care Due Messages. Reference number: [id number].   8/29/2024 2:51:42 PM CDT

## 2024-10-04 ENCOUNTER — PATIENT MESSAGE (OUTPATIENT)
Dept: FAMILY MEDICINE | Facility: CLINIC | Age: 29
End: 2024-10-04
Payer: OTHER MISCELLANEOUS

## 2024-10-04 DIAGNOSIS — J45.20 MILD INTERMITTENT ASTHMA WITHOUT COMPLICATION: ICD-10-CM

## 2024-10-04 RX ORDER — ALBUTEROL SULFATE 90 UG/1
2 INHALANT RESPIRATORY (INHALATION) EVERY 6 HOURS PRN
Qty: 54 G | Refills: 3 | Status: SHIPPED | OUTPATIENT
Start: 2024-10-04

## 2024-10-04 NOTE — TELEPHONE ENCOUNTER
No care due was identified.  [facility] Embedded Care Due Messages. Reference number: [id number].   10/04/2024 9:35:17 AM CDT

## 2024-10-05 NOTE — TELEPHONE ENCOUNTER
Refill Decision Note   Magda Lowery  is requesting a refill authorization.  Brief Assessment and Rationale for Refill:  Approve     Medication Therapy Plan:         Comments:     Note composed:8:27 PM 10/04/2024

## 2024-11-25 ENCOUNTER — PATIENT MESSAGE (OUTPATIENT)
Dept: FAMILY MEDICINE | Facility: CLINIC | Age: 29
End: 2024-11-25
Payer: COMMERCIAL

## 2024-12-02 ENCOUNTER — LAB VISIT (OUTPATIENT)
Dept: LAB | Facility: HOSPITAL | Age: 29
End: 2024-12-02
Attending: FAMILY MEDICINE
Payer: COMMERCIAL

## 2024-12-02 DIAGNOSIS — E03.9 ACQUIRED HYPOTHYROIDISM: ICD-10-CM

## 2024-12-02 LAB
T4 FREE SERPL-MCNC: 1.25 NG/DL (ref 0.71–1.51)
THYROPEROXIDASE IGG SERPL-ACNC: 424.1 IU/ML
TSH SERPL DL<=0.005 MIU/L-ACNC: <0.01 UIU/ML (ref 0.4–4)

## 2024-12-02 PROCEDURE — 36415 COLL VENOUS BLD VENIPUNCTURE: CPT | Mod: PO | Performed by: FAMILY MEDICINE

## 2024-12-02 PROCEDURE — 86376 MICROSOMAL ANTIBODY EACH: CPT | Performed by: FAMILY MEDICINE

## 2024-12-02 PROCEDURE — 84443 ASSAY THYROID STIM HORMONE: CPT | Performed by: FAMILY MEDICINE

## 2024-12-02 PROCEDURE — 84439 ASSAY OF FREE THYROXINE: CPT | Performed by: FAMILY MEDICINE

## 2024-12-06 ENCOUNTER — LAB VISIT (OUTPATIENT)
Dept: LAB | Facility: HOSPITAL | Age: 29
End: 2024-12-06
Attending: FAMILY MEDICINE
Payer: COMMERCIAL

## 2024-12-06 ENCOUNTER — OFFICE VISIT (OUTPATIENT)
Dept: FAMILY MEDICINE | Facility: CLINIC | Age: 29
End: 2024-12-06
Payer: COMMERCIAL

## 2024-12-06 VITALS
BODY MASS INDEX: 40.63 KG/M2 | SYSTOLIC BLOOD PRESSURE: 118 MMHG | DIASTOLIC BLOOD PRESSURE: 78 MMHG | WEIGHT: 267.19 LBS | OXYGEN SATURATION: 99 % | HEART RATE: 105 BPM

## 2024-12-06 DIAGNOSIS — E04.1 THYROID NODULE: ICD-10-CM

## 2024-12-06 DIAGNOSIS — Z01.84 IMMUNITY STATUS TESTING: ICD-10-CM

## 2024-12-06 DIAGNOSIS — Z11.1 SCREENING FOR TUBERCULOSIS: ICD-10-CM

## 2024-12-06 DIAGNOSIS — E03.9 ACQUIRED HYPOTHYROIDISM: ICD-10-CM

## 2024-12-06 DIAGNOSIS — Z02.0 SCHOOL PHYSICAL EXAM: ICD-10-CM

## 2024-12-06 DIAGNOSIS — E66.01 CLASS 3 SEVERE OBESITY DUE TO EXCESS CALORIES WITH SERIOUS COMORBIDITY AND BODY MASS INDEX (BMI) OF 40.0 TO 44.9 IN ADULT: ICD-10-CM

## 2024-12-06 DIAGNOSIS — E66.813 CLASS 3 SEVERE OBESITY DUE TO EXCESS CALORIES WITH SERIOUS COMORBIDITY AND BODY MASS INDEX (BMI) OF 40.0 TO 44.9 IN ADULT: ICD-10-CM

## 2024-12-06 DIAGNOSIS — E04.2 MULTINODULAR THYROID: Primary | ICD-10-CM

## 2024-12-06 DIAGNOSIS — E05.00 GRAVES DISEASE: ICD-10-CM

## 2024-12-06 PROCEDURE — 36415 COLL VENOUS BLD VENIPUNCTURE: CPT | Mod: PO | Performed by: FAMILY MEDICINE

## 2024-12-06 PROCEDURE — 86765 RUBEOLA ANTIBODY: CPT | Performed by: FAMILY MEDICINE

## 2024-12-06 PROCEDURE — 86706 HEP B SURFACE ANTIBODY: CPT | Performed by: FAMILY MEDICINE

## 2024-12-06 PROCEDURE — 86762 RUBELLA ANTIBODY: CPT | Performed by: FAMILY MEDICINE

## 2024-12-06 PROCEDURE — 86787 VARICELLA-ZOSTER ANTIBODY: CPT | Performed by: FAMILY MEDICINE

## 2024-12-06 PROCEDURE — 99999 PR PBB SHADOW E&M-EST. PATIENT-LVL IV: CPT | Mod: PBBFAC,,, | Performed by: FAMILY MEDICINE

## 2024-12-06 PROCEDURE — 86735 MUMPS ANTIBODY: CPT | Performed by: FAMILY MEDICINE

## 2024-12-06 RX ORDER — LEVOTHYROXINE SODIUM 125 UG/1
125 TABLET ORAL
Qty: 90 TABLET | Refills: 3 | Status: SHIPPED | OUTPATIENT
Start: 2024-12-06

## 2024-12-06 NOTE — PROGRESS NOTES
Subjective:         Patient ID: Magda Lowery is a 29 y.o. female.    Chief Complaint: Thyroid Problem    Patient Active Problem List   Diagnosis    Thyroid nodule    Mild intermittent asthma without complication    Acquired hypothyroidism    Multinodular thyroid    History of fracture of left ankle s/p surgery    Class 3 severe obesity due to excess calories with serious comorbidity and body mass index (BMI) of 40.0 to 44.9 in adult    Leukopenia    Graves disease      Thyroid Problem      Magda is a 29 y.o. female    History of Present Illness    CHIEF COMPLAINT:  Magda presents today for follow-up on thyroid condition.    THYROID CONDITION:  She reports elevated TSH levels on recent testing, with positive thyroid peroxidase antibodies. She experiences hot flashes, particularly within 15 minutes of taking thyroid medication, which then subside.    MENSTRUAL HISTORY:  She reports missing her period in September, but subsequently had a menstrual cycle in November. She is currently menstruating at the time of the visit.    FAMILY PLANNING:  She expresses a desire to have children in the future, but not in the near term.    VACCINATIONS:  She reports needing titers for vaccinations as a requirement for graduate school starting in January. She also requires a TB skin test within one year. She denies ever having a positive TB skin test result in the past.         Objective:     Vitals:    12/06/24 1304   BP: 118/78   BP Location: Left arm   Patient Position: Sitting   Pulse: 105   SpO2: 99%   Weight: 121.2 kg (267 lb 3.2 oz)         Physical Exam  Vitals and nursing note reviewed.   Constitutional:       General: She is not in acute distress.     Appearance: Normal appearance. She is not ill-appearing, toxic-appearing or diaphoretic.   HENT:      Head: Normocephalic and atraumatic.   Eyes:      General: No scleral icterus.     Conjunctiva/sclera: Conjunctivae normal.   Cardiovascular:      Rate and Rhythm: Normal rate.    Pulmonary:      Effort: Pulmonary effort is normal. No respiratory distress.   Skin:     Coloration: Skin is not pale.   Neurological:      Mental Status: She is alert. Mental status is at baseline.   Psychiatric:         Attention and Perception: Attention and perception normal.         Mood and Affect: Mood and affect normal.         Speech: Speech normal.         Behavior: Behavior normal.         Cognition and Memory: Cognition and memory normal.         Judgment: Judgment normal.       Assessment:       1. Multinodular thyroid    2. Thyroid nodule    3. Acquired hypothyroidism    4. Graves disease    5. Class 3 severe obesity due to excess calories with serious comorbidity and body mass index (BMI) of 40.0 to 44.9 in adult    6. Immunity status testing    7. School physical exam    8. Screening for tuberculosis          Plan:   Recent relevant labs results reviewed with patient.         Assessment & Plan    Thyroid function off balance; TSH levels indicate dosing is too elevated  Positive thyroid peroxidase antibodies suggest potential thyroid disease  Lowered thyroid medication dose from 150 to 125 mcg to address TSH imbalance and hot flashes    AUTOIMMUNE THYROIDITIS AND HYPOTHYROIDISM:  - Discussed potential risks associated with thyroid disease during pregnancy.  - Explained significance of positive thyroid peroxidase antibodies indicating possible thyroid disease.  - Decreased levothyroxine from 150 mcg to 125 mcg daily, to be taken first thing in the morning on an empty stomach.  - TSH recheck ordered for 3 months.  - Referred to Endocrinology for initial counseling and management of thyroid condition, particularly considering potential future pregnancy risks.  - Contact the office if hot flashes persist or worsen after lowering thyroid medication dose.    PRE-EMPLOYMENT EXAMINATION:  - Titers for grad school requirements (MMR, Hepatitis B) ordered.  - TB skin test to be scheduled with  nurse.    PROCREATIVE MANAGEMENT:  - Discussed potential risks associated with thyroid disease during pregnancy.    FOLLOW-UP:  - Follow up in 3 months for pap smear and to review lab results.         1. Multinodular thyroid  2. Thyroid nodule  Prior ultrasound reviewed, No follow up needed    3. Acquired hypothyroidism  4. Graves disease  -     levothyroxine (SYNTHROID) 125 MCG tablet; Take 1 tablet (125 mcg total) by mouth before breakfast.  Dispense: 90 tablet; Refill: 3  -     Ambulatory referral/consult to Endocrinology; Future; Expected date: 12/13/2024  -     TSH; Future; Expected date: 12/06/2024  Reduce Synthroid dose and recheck TSH in 10-12 weeks  Periods have corrected, more regular now    5. Class 3 severe obesity due to excess calories with serious comorbidity and body mass index (BMI) of 40.0 to 44.9 in adult  Lifestyle modifications    6. Immunity status testing  -     Rubella antibody, IgG; Future; Expected date: 12/06/2024  -     Rubeola antibody IgG; Future; Expected date: 12/06/2024  -     Mumps, IgG Screen; Future; Expected date: 12/06/2024  -     Hepatitis B Surface Antibody, Qual/Quant; Future; Expected date: 12/06/2024  -     Varicella zoster antibody, IgG; Future; Expected date: 12/06/2024    7. School physical exam  -     Rubella antibody, IgG; Future; Expected date: 12/06/2024  -     Rubeola antibody IgG; Future; Expected date: 12/06/2024  -     Mumps, IgG Screen; Future; Expected date: 12/06/2024  -     Hepatitis B Surface Antibody, Qual/Quant; Future; Expected date: 12/06/2024  -     Varicella zoster antibody, IgG; Future; Expected date: 12/06/2024    8. Screening for tuberculosis  -     POCT TB Skin Test Read    Patient's questions answered. Plan reviewed with patient at the end of visit. Relevant precautions to chief complaint and reasons to seek further medical care or to contact the office sooner reviewed with patient.     Follow up in about 3 months (around 3/6/2025) for Pap Smear,  Results Review, (prelab TSH).        Part of this note was dictated using voice recognition software. Please excuse any typographical errors.     This note was generated with the assistance of ambient listening technology. Verbal consent was obtained by the patient and accompanying visitor(s) for the recording of patient appointment to facilitate this note. I attest to having reviewed and edited the generated note for accuracy, though some syntax or spelling errors may persist. Please contact the author of this note for any clarification.

## 2024-12-09 LAB
MUMPS IGG INTERPRETATION: POSITIVE
MUMPS IGG SCREEN: 63.2 AU/ML
RUBEOLA IGG ANTIBODY: >300 AU/ML
RUBEOLA INTERPRETATION: POSITIVE
RUBV IGG SER-ACNC: 47.6 IU/ML
RUBV IGG SER-IMP: REACTIVE
VARICELLA INTERPRETATION: POSITIVE
VARICELLA ZOSTER IGG: 12.8 S/CO

## 2024-12-10 ENCOUNTER — CLINICAL SUPPORT (OUTPATIENT)
Dept: FAMILY MEDICINE | Facility: CLINIC | Age: 29
End: 2024-12-10
Payer: COMMERCIAL

## 2024-12-10 DIAGNOSIS — Z11.1 SCREENING FOR TUBERCULOSIS: Primary | ICD-10-CM

## 2024-12-10 PROCEDURE — 99999 PR PBB SHADOW E&M-EST. PATIENT-LVL I: CPT | Mod: PBBFAC,,,

## 2024-12-10 PROCEDURE — 86580 TB INTRADERMAL TEST: CPT | Mod: S$GLB,,, | Performed by: FAMILY MEDICINE

## 2024-12-12 ENCOUNTER — CLINICAL SUPPORT (OUTPATIENT)
Dept: FAMILY MEDICINE | Facility: CLINIC | Age: 29
End: 2024-12-12
Payer: COMMERCIAL

## 2024-12-12 DIAGNOSIS — Z11.1 SCREENING FOR TUBERCULOSIS: Primary | ICD-10-CM

## 2024-12-12 LAB
HBV SURFACE AB SER QL IA: POSITIVE
HBV SURFACE AB SERPL IA-ACNC: 33 MIU/ML

## 2024-12-20 ENCOUNTER — PATIENT MESSAGE (OUTPATIENT)
Dept: FAMILY MEDICINE | Facility: CLINIC | Age: 29
End: 2024-12-20
Payer: COMMERCIAL

## 2025-03-03 ENCOUNTER — LAB VISIT (OUTPATIENT)
Dept: LAB | Facility: HOSPITAL | Age: 30
End: 2025-03-03
Attending: FAMILY MEDICINE
Payer: COMMERCIAL

## 2025-03-03 DIAGNOSIS — E05.00 GRAVES DISEASE: ICD-10-CM

## 2025-03-03 DIAGNOSIS — E03.9 ACQUIRED HYPOTHYROIDISM: ICD-10-CM

## 2025-03-03 LAB
T4 FREE SERPL-MCNC: 1.18 NG/DL (ref 0.71–1.51)
TSH SERPL DL<=0.005 MIU/L-ACNC: 0.12 UIU/ML (ref 0.4–4)

## 2025-03-03 PROCEDURE — 84443 ASSAY THYROID STIM HORMONE: CPT | Performed by: FAMILY MEDICINE

## 2025-03-03 PROCEDURE — 84439 ASSAY OF FREE THYROXINE: CPT | Performed by: FAMILY MEDICINE

## 2025-03-03 PROCEDURE — 36415 COLL VENOUS BLD VENIPUNCTURE: CPT | Mod: PO | Performed by: FAMILY MEDICINE

## 2025-03-10 ENCOUNTER — PATIENT MESSAGE (OUTPATIENT)
Dept: FAMILY MEDICINE | Facility: CLINIC | Age: 30
End: 2025-03-10

## 2025-03-10 ENCOUNTER — OFFICE VISIT (OUTPATIENT)
Dept: FAMILY MEDICINE | Facility: CLINIC | Age: 30
End: 2025-03-10
Payer: COMMERCIAL

## 2025-03-10 VITALS
SYSTOLIC BLOOD PRESSURE: 124 MMHG | HEIGHT: 68 IN | BODY MASS INDEX: 41.96 KG/M2 | DIASTOLIC BLOOD PRESSURE: 82 MMHG | OXYGEN SATURATION: 98 % | HEART RATE: 88 BPM | WEIGHT: 276.88 LBS

## 2025-03-10 DIAGNOSIS — E05.00 GRAVES DISEASE: ICD-10-CM

## 2025-03-10 DIAGNOSIS — E66.813 CLASS 3 SEVERE OBESITY DUE TO EXCESS CALORIES WITH SERIOUS COMORBIDITY AND BODY MASS INDEX (BMI) OF 40.0 TO 44.9 IN ADULT: ICD-10-CM

## 2025-03-10 DIAGNOSIS — E66.01 CLASS 3 SEVERE OBESITY DUE TO EXCESS CALORIES WITH SERIOUS COMORBIDITY AND BODY MASS INDEX (BMI) OF 40.0 TO 44.9 IN ADULT: ICD-10-CM

## 2025-03-10 DIAGNOSIS — Z00.01 ENCOUNTER FOR GENERAL ADULT MEDICAL EXAMINATION WITH ABNORMAL FINDINGS: ICD-10-CM

## 2025-03-10 DIAGNOSIS — E03.9 ACQUIRED HYPOTHYROIDISM: Primary | ICD-10-CM

## 2025-03-10 PROCEDURE — 99214 OFFICE O/P EST MOD 30 MIN: CPT | Mod: S$PBB,,, | Performed by: FAMILY MEDICINE

## 2025-03-10 PROCEDURE — 1159F MED LIST DOCD IN RCRD: CPT | Mod: CPTII,,, | Performed by: FAMILY MEDICINE

## 2025-03-10 PROCEDURE — 3079F DIAST BP 80-89 MM HG: CPT | Mod: CPTII,,, | Performed by: FAMILY MEDICINE

## 2025-03-10 PROCEDURE — G2211 COMPLEX E/M VISIT ADD ON: HCPCS | Mod: S$PBB,,, | Performed by: FAMILY MEDICINE

## 2025-03-10 PROCEDURE — 99213 OFFICE O/P EST LOW 20 MIN: CPT | Mod: PBBFAC,PO | Performed by: FAMILY MEDICINE

## 2025-03-10 PROCEDURE — 3008F BODY MASS INDEX DOCD: CPT | Mod: CPTII,,, | Performed by: FAMILY MEDICINE

## 2025-03-10 PROCEDURE — 1160F RVW MEDS BY RX/DR IN RCRD: CPT | Mod: CPTII,,, | Performed by: FAMILY MEDICINE

## 2025-03-10 PROCEDURE — 99999 PR PBB SHADOW E&M-EST. PATIENT-LVL III: CPT | Mod: PBBFAC,,, | Performed by: FAMILY MEDICINE

## 2025-03-10 PROCEDURE — 3074F SYST BP LT 130 MM HG: CPT | Mod: CPTII,,, | Performed by: FAMILY MEDICINE

## 2025-03-10 RX ORDER — LEVOTHYROXINE SODIUM 112 UG/1
112 TABLET ORAL
Qty: 90 TABLET | Refills: 0 | Status: SHIPPED | OUTPATIENT
Start: 2025-03-10

## 2025-03-10 NOTE — PROGRESS NOTES
"Subjective:         Patient ID: Magda Lowery is a 29 y.o. female.    Chief Complaint: Gynecologic Exam and Results    Patient Active Problem List   Diagnosis    Thyroid nodule    Mild intermittent asthma without complication    Acquired hypothyroidism    Multinodular thyroid    History of fracture of left ankle s/p surgery    Class 3 severe obesity due to excess calories with serious comorbidity and body mass index (BMI) of 40.0 to 44.9 in adult    Leukopenia    Graves disease      Gynecologic Exam      Magda is a 29 y.o. female    On period today. Patient opts to defer pap.   Reviewed labs. Decrease Levothyroxine dose. Has Endo appt.    Vitals:    03/10/25 0709   BP: 124/82   BP Location: Left arm   Patient Position: Sitting   Pulse: 88   SpO2: 98%   Weight: 125.6 kg (276 lb 14.4 oz)   Height: 5' 8" (1.727 m)         Physical Exam  Vitals and nursing note reviewed.   Constitutional:       General: She is not in acute distress.     Appearance: Normal appearance. She is not ill-appearing, toxic-appearing or diaphoretic.   HENT:      Head: Normocephalic and atraumatic.   Eyes:      General: No scleral icterus.     Conjunctiva/sclera: Conjunctivae normal.   Cardiovascular:      Rate and Rhythm: Normal rate.   Pulmonary:      Effort: Pulmonary effort is normal. No respiratory distress.   Skin:     Coloration: Skin is not pale.   Neurological:      Mental Status: She is alert. Mental status is at baseline.   Psychiatric:         Attention and Perception: Attention and perception normal.         Mood and Affect: Mood and affect normal.         Speech: Speech normal.         Behavior: Behavior normal.         Cognition and Memory: Cognition and memory normal.         Judgment: Judgment normal.       Assessment:       1. Acquired hypothyroidism    2. Graves disease    3. Class 3 severe obesity due to excess calories with serious comorbidity and body mass index (BMI) of 40.0 to 44.9 in adult    4. Encounter for general " adult medical examination with abnormal findings          Plan:   Recent relevant labs results reviewed with patient.       1. Acquired hypothyroidism  2. Graves disease  -     levothyroxine (SYNTHROID) 112 MCG tablet; Take 1 tablet (112 mcg total) by mouth before breakfast.  Dispense: 90 tablet; Refill: 0  -     TSH; Future; Expected date: 05/01/2025  Uncontrolled.   TSH below goal, decrease dose.   Has prelabs set up prior to Endo appt in May    3. Class 3 severe obesity due to excess calories with serious comorbidity and body mass index (BMI) of 40.0 to 44.9 in adult  -     Lipid Panel; Future; Expected date: 08/01/2025  -     TSH; Future; Expected date: 08/01/2025  -     Hemoglobin A1C; Future; Expected date: 08/01/2025    4. Encounter for general adult medical examination with abnormal findings  -     Hepatic Function Panel; Future; Expected date: 08/01/2025  -     Renal Function Panel; Future; Expected date: 08/01/2025  -     Lipid Panel; Future; Expected date: 08/01/2025  -     TSH; Future; Expected date: 08/01/2025  -     Hemoglobin A1C; Future; Expected date: 08/01/2025  -     CBC Auto Differential; Future; Expected date: 08/01/2025    Reschedule pap smear.     Patient's questions answered. Plan reviewed with patient at the end of visit. Relevant precautions to chief complaint and reasons to seek further medical care or to contact the office sooner reviewed with patient.     Follow up in about 6 weeks (around 4/21/2025) for Pap Smear.      Part of this note was dictated using voice recognition software. Please excuse any typographical errors.     This note was generated with the assistance of ambient listening technology. Verbal consent was obtained by the patient and accompanying visitor(s) for the recording of patient appointment to facilitate this note. I attest to having reviewed and edited the generated note for accuracy, though some syntax or spelling errors may persist. Please contact the author of  this note for any clarification.

## 2025-04-09 ENCOUNTER — PATIENT OUTREACH (OUTPATIENT)
Dept: ADMINISTRATIVE | Facility: HOSPITAL | Age: 30
End: 2025-04-09
Payer: COMMERCIAL

## 2025-04-09 NOTE — PROGRESS NOTES
04/09/2025  VB chart audit performed. Care Everywhere updates requested and reviewed  Overdue HM topic chart audit and/or requested. LINKS triggered and reconciled. Media reviewed Lvm/portal sent regarding overdue health topics

## 2025-04-17 DIAGNOSIS — J45.20 MILD INTERMITTENT ASTHMA WITHOUT COMPLICATION: ICD-10-CM

## 2025-04-18 NOTE — TELEPHONE ENCOUNTER
No care due was identified.  [facility] Embedded Care Due Messages. Reference number: [id number].   4/17/2025 7:23:01 PM CDT

## 2025-04-21 ENCOUNTER — OFFICE VISIT (OUTPATIENT)
Dept: FAMILY MEDICINE | Facility: CLINIC | Age: 30
End: 2025-04-21
Payer: COMMERCIAL

## 2025-04-21 VITALS
HEART RATE: 95 BPM | WEIGHT: 276.69 LBS | OXYGEN SATURATION: 99 % | DIASTOLIC BLOOD PRESSURE: 68 MMHG | BODY MASS INDEX: 42.07 KG/M2 | SYSTOLIC BLOOD PRESSURE: 110 MMHG

## 2025-04-21 DIAGNOSIS — Z01.419 WELL WOMAN EXAM WITH ROUTINE GYNECOLOGICAL EXAM: Primary | ICD-10-CM

## 2025-04-21 DIAGNOSIS — J45.20 MILD INTERMITTENT ASTHMA WITHOUT COMPLICATION: ICD-10-CM

## 2025-04-21 DIAGNOSIS — E66.01 CLASS 3 SEVERE OBESITY DUE TO EXCESS CALORIES WITH SERIOUS COMORBIDITY AND BODY MASS INDEX (BMI) OF 40.0 TO 44.9 IN ADULT: ICD-10-CM

## 2025-04-21 DIAGNOSIS — E66.813 CLASS 3 SEVERE OBESITY DUE TO EXCESS CALORIES WITH SERIOUS COMORBIDITY AND BODY MASS INDEX (BMI) OF 40.0 TO 44.9 IN ADULT: ICD-10-CM

## 2025-04-21 DIAGNOSIS — Z12.4 SCREENING FOR CERVICAL CANCER: ICD-10-CM

## 2025-04-21 PROCEDURE — 99999 PR PBB SHADOW E&M-EST. PATIENT-LVL III: CPT | Mod: PBBFAC,,, | Performed by: FAMILY MEDICINE

## 2025-04-21 PROCEDURE — 88175 CYTOPATH C/V AUTO FLUID REDO: CPT | Performed by: FAMILY MEDICINE

## 2025-04-21 RX ORDER — ALBUTEROL SULFATE 90 UG/1
2 INHALANT RESPIRATORY (INHALATION) EVERY 6 HOURS PRN
Qty: 54 G | Refills: 3 | OUTPATIENT
Start: 2025-04-21

## 2025-04-21 RX ORDER — ALBUTEROL SULFATE 90 UG/1
2 INHALANT RESPIRATORY (INHALATION) EVERY 6 HOURS PRN
Qty: 54 G | Refills: 3 | Status: SHIPPED | OUTPATIENT
Start: 2025-04-21

## 2025-04-21 NOTE — TELEPHONE ENCOUNTER
Refill Decision Note   Magda Lowery  is requesting a refill authorization.  Brief Assessment and Rationale for Refill:  Quick Discontinue     Medication Therapy Plan:  duplicate (E-Prescribing Status: Receipt confirmed by pharmacy (4/21/2025)      Comments:     Note composed:2:40 PM 04/21/2025

## 2025-04-21 NOTE — TELEPHONE ENCOUNTER
No care due was identified.  [facility] Embedded Care Due Messages. Reference number: [id number].   4/21/2025 8:55:53 AM CDT

## 2025-04-21 NOTE — PROGRESS NOTES
Subjective:         Patient ID: Magda Lowery is a 29 y.o. female.    Chief Complaint: Gynecologic Exam    Patient Active Problem List   Diagnosis    Thyroid nodule    Mild intermittent asthma without complication    Acquired hypothyroidism    Multinodular thyroid    History of fracture of left ankle s/p surgery    Class 3 severe obesity due to excess calories with serious comorbidity and body mass index (BMI) of 40.0 to 44.9 in adult    Leukopenia    Graves disease      HPI    Magda is a 29 y.o. female    History of Present Illness    CHIEF COMPLAINT:  Magda presents today for annual well woman exam with pap smear    GYNECOLOGIC HISTORY:  Her last menstrual period occurred during the first week of April, characterized by abnormal spotting rather than typical flow. This is her first experience with an irregular menstrual cycle. She denies history of abnormal pap smears and current vaginal symptoms such as itching or irritation.     SEXUAL AND CONTRACEPTIVE HISTORY:  She denies recent sexual activity and is not currently using birth control.    MEDICATIONS:  She is tolerating recent thyroid medication adjustment to 112. She requests albuterol refill.    SOCIAL HISTORY:  She reports school as her only current stressor.       Objective:     Vitals:    04/21/25 0722   BP: 110/68   BP Location: Left arm   Patient Position: Sitting   Pulse: 95   SpO2: 99%   Weight: 125.5 kg (276 lb 10.8 oz)         Physical Exam  Vitals and nursing note reviewed. Exam conducted with a chaperone present.   Constitutional:       General: She is not in acute distress.     Appearance: Normal appearance. She is not ill-appearing, toxic-appearing or diaphoretic.   HENT:      Head: Normocephalic and atraumatic.   Eyes:      General: No scleral icterus.     Conjunctiva/sclera: Conjunctivae normal.   Cardiovascular:      Rate and Rhythm: Normal rate.   Pulmonary:      Effort: Pulmonary effort is normal. No respiratory distress.   Genitourinary:      General: Normal vulva.      Exam position: Lithotomy position.      Pubic Area: No rash.       Labia:         Right: No rash, tenderness, lesion or injury.         Left: No rash, tenderness, lesion or injury.       Urethra: No prolapse, urethral pain, urethral swelling or urethral lesion.      Vagina: Normal. No lesions.      Cervix: No cervical motion tenderness, lesion or erythema.      Uterus: Normal. Not tender.       Adnexa: Right adnexa normal and left adnexa normal.        Right: No mass, tenderness or fullness.          Left: No mass, tenderness or fullness.     Skin:     General: Skin is dry.      Coloration: Skin is not pale.   Neurological:      Mental Status: She is alert. Mental status is at baseline.   Psychiatric:         Attention and Perception: Attention and perception normal.         Mood and Affect: Mood and affect normal.         Speech: Speech normal.         Behavior: Behavior normal.         Cognition and Memory: Cognition and memory normal.         Judgment: Judgment normal.       Assessment:       1. Well woman exam with routine gynecological exam    2. Screening for cervical cancer    3. Mild intermittent asthma without complication    4. Class 3 severe obesity due to excess calories with serious comorbidity and body mass index (BMI) of 40.0 to 44.9 in adult          Plan:   Recent relevant labs results reviewed with patient.         Assessment & Plan    Assessed menstrual cycle, noting recent irregular spotting in early April.  Evaluated thyroid medication, maintaining current dose of 112 mcg pending lab results in late May.  Due for annual pap smear.  Considered potential causes for irregular menstruation, including recent thyroid medication adjustment and stress from school.  Decided against HPV testing due to age (under 30).  PREVENTIVE CARE:  - Explained breast self-exam technique and when to seek medical attention for breast changes.  - Performed pap smear.    FOLLOW-UP:  - Follow  up on 8/14 for annual visit with lab appointment.         1. Well woman exam with routine gynecological exam  2. Screening for cervical cancer  -     Liquid-Based Pap Smear, Screening    3. Mild intermittent asthma without complication  -     albuterol (PROVENTIL/VENTOLIN HFA) 90 mcg/actuation inhaler; Inhale 2 puffs into the lungs every 6 (six) hours as needed for Wheezing or Shortness of Breath. Rescue  Dispense: 54 g; Refill: 3    4. Class 3 severe obesity due to excess calories with serious comorbidity and body mass index (BMI) of 40.0 to 44.9 in adult      Patient's questions answered. Plan reviewed with patient at the end of visit. Relevant precautions to chief complaint and reasons to seek further medical care or to contact the office sooner reviewed with patient.     Follow up in about 4 months (around 8/21/2025) for Annual Exam, (prelabs).        Part of this note was dictated using voice recognition software. Please excuse any typographical errors.     This note was generated with the assistance of ambient listening technology. Verbal consent was obtained by the patient and accompanying visitor(s) for the recording of patient appointment to facilitate this note. I attest to having reviewed and edited the generated note for accuracy, though some syntax or spelling errors may persist. Please contact the author of this note for any clarification.

## 2025-04-21 NOTE — TELEPHONE ENCOUNTER
Refill Decision Note   Magda Lowery  is requesting a refill authorization.  Brief Assessment and Rationale for Refill:  Quick Discontinue     Medication Therapy Plan:  E-Prescribing Status: Receipt confirmed by pharmacy (4/21/2025      Comments:     Note composed:2:39 PM 04/21/2025

## 2025-04-25 ENCOUNTER — RESULTS FOLLOW-UP (OUTPATIENT)
Dept: FAMILY MEDICINE | Facility: CLINIC | Age: 30
End: 2025-04-25

## 2025-04-25 LAB
CLINICAL INFO: NORMAL
COMMENT: NORMAL
CYTO CVX: NORMAL
CYTOLOGIST CVX/VAG CYTO: NORMAL
CYTOLOGY CMNT CVX/VAG CYTO-IMP: NORMAL
DATE OF PREVIOUS PAP: NORMAL
DATE PREVIOUS BX: NORMAL
LMP START DATE: NORMAL
SPECIMEN SOURCE CVX/VAG CYTO: NORMAL
STAT OF ADQ CVX/VAG CYTO-IMP: NORMAL

## 2025-05-10 ENCOUNTER — LAB VISIT (OUTPATIENT)
Dept: LAB | Facility: HOSPITAL | Age: 30
End: 2025-05-10
Attending: FAMILY MEDICINE
Payer: COMMERCIAL

## 2025-05-10 DIAGNOSIS — E05.00 GRAVES DISEASE: ICD-10-CM

## 2025-05-10 DIAGNOSIS — E03.9 ACQUIRED HYPOTHYROIDISM: ICD-10-CM

## 2025-05-10 LAB — TSH SERPL-ACNC: 1.79 UIU/ML (ref 0.4–4)

## 2025-05-10 PROCEDURE — 84443 ASSAY THYROID STIM HORMONE: CPT

## 2025-05-10 PROCEDURE — 36415 COLL VENOUS BLD VENIPUNCTURE: CPT | Mod: PO

## 2025-05-11 NOTE — PROGRESS NOTES
Subjective:      Patient ID: Magda Lowery is referred by La Nena Ramirez MD     Chief Complaint:  Hypothyroidism    History of Present Illness    Magda Lowery is a 29 y.o. female who presents for evaluation of hypothyroidism.       Hypothyroidism    Dx with subclinical hypothyroidism for about 3 years, started on levothyroxine in 2024 due to low FT4.   2/2 Hashimoto's thyroiditis    Current medication:  Levothyroxine 112 mcg daily, on this dose since 3/2025.   Takes as instructed    Initially had flushing/palpitations on levothyroxine 150 mcg dose and has been gradually decreased.   Reports some increased craving but otherwise denies hyperthyroid symptoms.  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     Cardiovascular disorder: Denies    Symptoms  No   Yes  [x]    []            Weight gain - stable  [x]    []            Fatigue  []    [x]            Constipation - mild chronic constipation.  [x]    []            Cold intolerance  [x]    []            Dry skin  [x]    []            Insomnia  [x]    []            Hair loss  [x]    []            Heat intolerance  [x]    []            Palpitations     Menstrual cycle: Regular    Biotin use: Denies      Neck US 01/2023:    COMPARISON:  Ultrasound 04/12/2022     FINDINGS:  Right lobe of thyroid measures 5.7 x 1.9 x 2.7 cm.  Left lobe of thyroid measures 5.5 x 1.6 x 2.1 cm.  Thyroid isthmus measures 0.4 cm AP dimension.     2.4 cm cystic nodule at the right superior pole, 1.2 cm solid isoechoic nodule at the right midpole, and 1.5 cm predominantly cystic nodule at the right mid to lower pole.  None require continued surveillance.     Three subcentimeter left-sided thyroid nodules not requiring continued surveillance.     Cervical lymphadenopathy.     Impression:     Multinodular thyroid.  No nodule requires continued surveillance per ACR TI-RADS criteria.        US thyroid 04/2022    FINDINGS:  Bilateral predominantly cystic thyroid nodules are noted.    "  On the right, largest of these lesions measures 2.6 cm in the superior aspect of the thyroid.  Also on the right, is a slightly complex cystic lesion measuring 1 cm and a clear cystic lesion measuring 1.5 cm.     On the left, predominantly cystic lesions are noted measuring up to 1.2 cm.     Impression:     Bilateral cystic thyroid nodules. Given size of the dominant right upper pole nodule, yearly follow-up may be beneficial.      Planning on pregnancy: None at this time.    Family history of thyroid disease: Denies      Lab Results   Component Value Date    TSH 1.789 05/10/2025    TSH 0.121 (L) 03/03/2025    TSH <0.010 (L) 12/02/2024    TSH 6.984 (H) 08/23/2024    TSH 7.951 (H) 05/20/2024    FREET4 1.18 03/03/2025    FREET4 1.25 12/02/2024    FREET4 0.69 (L) 08/23/2024    FREET4 0.75 05/20/2024    FREET4 0.76 08/25/2023       Lab Results   Component Value Date    THYROPEROXID 424.1 (H) 12/02/2024       Vitamin D deficiency    Inconsistent use, now taking OTC vitamin D daily.     Lab Results   Component Value Date    WUKECAZQ55TT 24 (L) 04/06/2021    HDEKACRR68HC 7 (L) 01/05/2021      ROS:   As above    Objective:     /86 (Patient Position: Sitting)   Pulse 91   Ht 5' 8" (1.727 m)   Wt 124.1 kg (273 lb 9.5 oz)   BMI 41.60 kg/m²     Physical Exam  Constitutional:       General: She is not in acute distress.     Appearance: She is not ill-appearing.   Eyes:      Conjunctiva/sclera: Conjunctivae normal.   Neck:      Comments: Nodular right thyroid gland  Cardiovascular:      Rate and Rhythm: Normal rate and regular rhythm.   Pulmonary:      Effort: Pulmonary effort is normal.      Breath sounds: Normal breath sounds.   Musculoskeletal:      Cervical back: Neck supple.      Right lower leg: No edema.      Left lower leg: No edema.   Skin:     General: Skin is warm and dry.   Neurological:      Mental Status: She is alert and oriented to person, place, and time.           Lab Review:     Lab Results "   Component Value Date    HGBA1C 5.4 08/23/2024     Lab Results   Component Value Date    CHOL 179 08/23/2024    HDL 60 08/23/2024    LDLCALC 112.2 08/23/2024    TRIG 34 08/23/2024    CHOLHDL 33.5 08/23/2024     Lab Results   Component Value Date     08/23/2024    K 4.1 08/23/2024     08/23/2024    CO2 27 08/23/2024    GLU 92 08/23/2024    BUN 10 08/23/2024    CREATININE 0.8 08/23/2024    CALCIUM 9.1 08/23/2024    PROT 7.3 08/23/2024    ALBUMIN 3.7 08/23/2024    ALBUMIN 3.7 08/23/2024    BILITOT 0.3 08/23/2024    ALKPHOS 78 08/23/2024    AST 15 08/23/2024    ALT 13 08/23/2024    ANIONGAP 6 (L) 08/23/2024    EGFRNORACEVR >60.0 08/23/2024    TSH 1.789 05/10/2025          Vit D, 25-Hydroxy   Date Value Ref Range Status   04/06/2021 24 (L) 30 - 96 ng/mL Final     Comment:     Vitamin D deficiency.........<10 ng/mL                              Vitamin D insufficiency......10-29 ng/mL       Vitamin D sufficiency........> or equal to 30 ng/mL  Vitamin D toxicity............>100 ng/mL         Assessment and Plan     1. Acquired hypothyroidism  Assessment & Plan:    Diagnosed in 2024.  Due to Hashimoto's thyroiditis.  Currently on levothyroxine 112 mcg daily since 03/2025.  Recent thyroid function test was normal.  Reports improvement in her hyperthyroid symptoms after lowering her dose.    Continue her current levothyroxine and repeat labs in 3 months.  Call if she has any symptoms of hypothyroidism or hyperthyroidism.    Discussed increased requirement of thyroid hormone during pregnancy.  Call if planning on pregnancy to adjust her levothyroxine dose.        Orders:  -     Ambulatory referral/consult to Endocrinology    2. Multinodular thyroid  Assessment & Plan:    Ultrasound thyroid in 2022 showed bilateral cystic thyroid nodules.  Repeat ultrasound thyroid in 01/2023 showed stable nodules bilaterally not meeting criteria for FNA biopsy.  Denies any compressive neck symptoms.  Discussed monitoring her  ultrasound periodically and for any neck symptoms.  Consider repeating ultrasound in 1-2 to years.        3. Vitamin D deficiency  Assessment & Plan:    Continue her vitamin-D supplement.  Monitor periodically.             Follow up in about 1 year (around 5/13/2026).     Yanna TREJO Rai, MD

## 2025-05-13 ENCOUNTER — OFFICE VISIT (OUTPATIENT)
Dept: ENDOCRINOLOGY | Facility: CLINIC | Age: 30
End: 2025-05-13
Payer: COMMERCIAL

## 2025-05-13 VITALS
HEART RATE: 91 BPM | DIASTOLIC BLOOD PRESSURE: 86 MMHG | WEIGHT: 273.56 LBS | HEIGHT: 68 IN | SYSTOLIC BLOOD PRESSURE: 125 MMHG | BODY MASS INDEX: 41.46 KG/M2

## 2025-05-13 DIAGNOSIS — E55.9 VITAMIN D DEFICIENCY: ICD-10-CM

## 2025-05-13 DIAGNOSIS — E04.2 MULTINODULAR THYROID: ICD-10-CM

## 2025-05-13 DIAGNOSIS — E03.9 ACQUIRED HYPOTHYROIDISM: Primary | ICD-10-CM

## 2025-05-13 PROCEDURE — 3074F SYST BP LT 130 MM HG: CPT | Mod: CPTII,S$GLB,, | Performed by: INTERNAL MEDICINE

## 2025-05-13 PROCEDURE — 99204 OFFICE O/P NEW MOD 45 MIN: CPT | Mod: S$GLB,,, | Performed by: INTERNAL MEDICINE

## 2025-05-13 PROCEDURE — 3079F DIAST BP 80-89 MM HG: CPT | Mod: CPTII,S$GLB,, | Performed by: INTERNAL MEDICINE

## 2025-05-13 PROCEDURE — 1160F RVW MEDS BY RX/DR IN RCRD: CPT | Mod: CPTII,S$GLB,, | Performed by: INTERNAL MEDICINE

## 2025-05-13 PROCEDURE — 3008F BODY MASS INDEX DOCD: CPT | Mod: CPTII,S$GLB,, | Performed by: INTERNAL MEDICINE

## 2025-05-13 PROCEDURE — 1159F MED LIST DOCD IN RCRD: CPT | Mod: CPTII,S$GLB,, | Performed by: INTERNAL MEDICINE

## 2025-05-13 PROCEDURE — 99999 PR PBB SHADOW E&M-EST. PATIENT-LVL III: CPT | Mod: PBBFAC,,, | Performed by: INTERNAL MEDICINE

## 2025-05-13 NOTE — ASSESSMENT & PLAN NOTE
Diagnosed in 2024.  Due to Hashimoto's thyroiditis.  Currently on levothyroxine 112 mcg daily since 03/2025.  Recent thyroid function test was normal.  Reports improvement in her hyperthyroid symptoms after lowering her dose.    Continue her current levothyroxine and repeat labs in 3 months.  Call if she has any symptoms of hypothyroidism or hyperthyroidism.    Discussed increased requirement of thyroid hormone during pregnancy.  Call if planning on pregnancy to adjust her levothyroxine dose.

## 2025-05-13 NOTE — ASSESSMENT & PLAN NOTE
Ultrasound thyroid in 2022 showed bilateral cystic thyroid nodules.  Repeat ultrasound thyroid in 01/2023 showed stable nodules bilaterally not meeting criteria for FNA biopsy.  Denies any compressive neck symptoms.  Discussed monitoring her ultrasound periodically and for any neck symptoms.  Consider repeating ultrasound in 1-2 to years.

## 2025-05-22 ENCOUNTER — PATIENT MESSAGE (OUTPATIENT)
Dept: FAMILY MEDICINE | Facility: CLINIC | Age: 30
End: 2025-05-22
Payer: COMMERCIAL

## 2025-06-03 DIAGNOSIS — E03.9 ACQUIRED HYPOTHYROIDISM: ICD-10-CM

## 2025-06-03 DIAGNOSIS — E05.00 GRAVES DISEASE: ICD-10-CM

## 2025-06-03 RX ORDER — LEVOTHYROXINE SODIUM 112 UG/1
112 TABLET ORAL
Qty: 90 TABLET | Refills: 3 | Status: SHIPPED | OUTPATIENT
Start: 2025-06-03

## 2025-06-08 ENCOUNTER — PATIENT MESSAGE (OUTPATIENT)
Dept: FAMILY MEDICINE | Facility: CLINIC | Age: 30
End: 2025-06-08
Payer: COMMERCIAL

## 2025-06-09 ENCOUNTER — OFFICE VISIT (OUTPATIENT)
Dept: FAMILY MEDICINE | Facility: CLINIC | Age: 30
End: 2025-06-09
Payer: COMMERCIAL

## 2025-06-09 VITALS
HEART RATE: 98 BPM | BODY MASS INDEX: 40.81 KG/M2 | DIASTOLIC BLOOD PRESSURE: 78 MMHG | WEIGHT: 275.56 LBS | SYSTOLIC BLOOD PRESSURE: 120 MMHG | HEIGHT: 69 IN | OXYGEN SATURATION: 98 %

## 2025-06-09 DIAGNOSIS — F41.1 GENERALIZED ANXIETY DISORDER WITH PANIC ATTACKS: Primary | ICD-10-CM

## 2025-06-09 DIAGNOSIS — F41.0 GENERALIZED ANXIETY DISORDER WITH PANIC ATTACKS: Primary | ICD-10-CM

## 2025-06-09 PROCEDURE — 3074F SYST BP LT 130 MM HG: CPT | Mod: CPTII,S$GLB,, | Performed by: FAMILY MEDICINE

## 2025-06-09 PROCEDURE — 3078F DIAST BP <80 MM HG: CPT | Mod: CPTII,S$GLB,, | Performed by: FAMILY MEDICINE

## 2025-06-09 PROCEDURE — 3008F BODY MASS INDEX DOCD: CPT | Mod: CPTII,S$GLB,, | Performed by: FAMILY MEDICINE

## 2025-06-09 PROCEDURE — 99999 PR PBB SHADOW E&M-EST. PATIENT-LVL IV: CPT | Mod: PBBFAC,,, | Performed by: FAMILY MEDICINE

## 2025-06-09 PROCEDURE — G2211 COMPLEX E/M VISIT ADD ON: HCPCS | Mod: S$GLB,,, | Performed by: FAMILY MEDICINE

## 2025-06-09 PROCEDURE — 99215 OFFICE O/P EST HI 40 MIN: CPT | Mod: S$GLB,,, | Performed by: FAMILY MEDICINE

## 2025-06-09 NOTE — LETTER
June 23, 2025      [facility]                                                                        Patient: Magda Lowery   [address]                                                                              YOB: 1995  [place] [address]                                                                               Date of Visit: 06/09/2025  Phone: [phone]  Fax: [phone]     To Whom It May Concern,    I am writing on behalf of my patient, Magda Lowery, who is currently under my care. Magda has been diagnosed with Generalized Anxiety Disorder (DAVID) as defined in the DSM-5 criteria. This diagnosis is based on a comprehensive clinical evaluation and ongoing treatment.    Due to the nature of this condition, Magda experiences symptoms such as excessive worry, difficulty concentrating, sleep disturbances, physical tension, and heightened stress responses. These symptoms can significantly impair her ability to perform in high-pressure academic settings, particularly during timed exams, oral presentations, and periods of intensive coursework.    In light of this, I recommend that Magda be granted reasonable academic accommodations to support their educational progress. 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     These adjustments are not intended to give Magda an unfair advantage, but rather to provide an equitable academic  experience given their documented mental health condition.  Please feel free to contact me if you require any additional information or documentation.    Sincerely,                                     La Nena Ramirez MD                                 Family Medicine/ Primary Care

## 2025-06-09 NOTE — PATIENT INSTRUCTIONS
Please call this number to schedule your initial counseling appointment. The referral order has been placed, but the patient has to initiate the scheduling.   [phone]

## 2025-06-09 NOTE — PROGRESS NOTES
"Subjective:         Patient ID: Magda Lowery is a 29 y.o. female.    Chief Complaint: Annual Exam and Anxiety    Patient Active Problem List   Diagnosis    Thyroid nodule    Mild intermittent asthma without complication    Acquired hypothyroidism    Multinodular thyroid    History of fracture of left ankle s/p surgery    Class 3 severe obesity due to excess calories with serious comorbidity and body mass index (BMI) of 40.0 to 44.9 in adult    Leukopenia    Vitamin D deficiency    Generalized anxiety disorder with panic attacks        Magda is a 29 y.o. 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 She reports constant anxiety and worry affecting both academic and personal life. Her anxiety symptoms have worsened this semester. She experiences panic attacks, particularly during finals, with 3-4 episodes during her first semester that would incapacitate her for several hours, making it difficult to resume work on her laptop. Physical symptoms include racing heart, shoulder tension, difficulty physically relaxing, and constantly running through her to-do list mentally. She reports significant sleep disturbance, describing her sleep quality as "horrible" with restlessness during sleep.    FAMILY HISTORY:  Mother has anxiety.    LABS:  Thyroid function tests were normal in May 2025.         Objective:     Vitals:    06/09/25 0702   BP: 120/78   BP Location: Left arm   Patient Position: Sitting   Pulse: 98   SpO2: 98%   Weight: 125 kg (275 lb 9.2 oz)   Height: 5' 9" (1.753 m)         Physical Exam  Vitals and nursing note reviewed.   Constitutional:       General: She is not in acute distress.     Appearance: Normal appearance. She is not ill-appearing, toxic-appearing or diaphoretic.   HENT:      Head: " Normocephalic and atraumatic.   Eyes:      General: No scleral icterus.     Conjunctiva/sclera: Conjunctivae normal.   Cardiovascular:      Rate and Rhythm: Normal rate.   Pulmonary:      Effort: Pulmonary effort is normal. No respiratory distress.   Skin:     Coloration: Skin is not pale.   Neurological:      Mental Status: She is alert. Mental status is at baseline.   Psychiatric:         Attention and Perception: Attention and perception normal.         Mood and Affect: Mood is anxious. Affect is tearful.         Speech: Speech normal.         Behavior: Behavior normal.         Thought Content: Thought content normal.         Cognition and Memory: Cognition and memory normal.         Judgment: Judgment normal.       Assessment:       1. Generalized anxiety disorder with panic attacks          Plan:   Recent relevant labs results reviewed with patient.         Assessment & Plan    Meets criteria for generalized anxiety disorder diagnosis based on reported symptoms and impact on daily functioning.  Anxiety likely exacerbated by current academic program stressors.  Thyroid function normal based on May 2025 labs, ruling out thyroid dysfunction as cause.  Recommend multi-pronged approach: testing accommodations, counseling, and consideration of medication.  Discussed SSRI and SNRI medication options as potential treatments if patient decides to pursue pharmacological intervention, including how they work to address anxiety symptoms and typical timeline for efficacy (6-8 weeks) and dose adjustment process.    Patient declines meds at this time.   Will look into school resources.   Reviewed her patient message and attached letter from her professor.   Letter for accommodations provided.     - Scheduled follow up in 1-2 weeks via video call to discuss medication decision.  - Follow up in August for annual appointment.         1. Generalized anxiety disorder with panic attacks  -     Ambulatory referral/consult to  Psychology; Future; Expected date: 06/16/2025      Patient's questions answered. Plan reviewed with patient at the end of visit. Relevant precautions to chief complaint and reasons to seek further medical care or to contact the office sooner reviewed with patient.     Follow up in about 2 months (around 8/9/2025) for Annual Exam, (prelabs), - already scheduled.        I spent a total of 40 minutes on the day of the visit.  This includes face to face time and non-face to face time preparing to see the patient (eg, review of tests), obtaining and/or reviewing separately obtained history, documenting clinical information in the electronic or other health record, independently interpreting results and communicating results to the patient/family/caregiver, or care coordinator.    Part of this note was dictated using voice recognition software. Please excuse any typographical errors.     This note was generated with the assistance of ambient listening technology. Verbal consent was obtained by the patient and accompanying visitor(s) for the recording of patient appointment to facilitate this note. I attest to having reviewed and edited the generated note for accuracy, though some syntax or spelling errors may persist. Please contact the author of this note for any clarification.

## 2025-06-10 ENCOUNTER — PATIENT MESSAGE (OUTPATIENT)
Dept: FAMILY MEDICINE | Facility: CLINIC | Age: 30
End: 2025-06-10
Payer: COMMERCIAL

## 2025-06-18 ENCOUNTER — PATIENT MESSAGE (OUTPATIENT)
Dept: FAMILY MEDICINE | Facility: CLINIC | Age: 30
End: 2025-06-18
Payer: COMMERCIAL

## 2025-06-18 ENCOUNTER — TELEPHONE (OUTPATIENT)
Dept: FAMILY MEDICINE | Facility: CLINIC | Age: 30
End: 2025-06-18
Payer: COMMERCIAL

## 2025-06-18 NOTE — TELEPHONE ENCOUNTER
LVM for Pt. Relyayed that I will follow up about letter tomorrow.        Pt requesting follow up with testing accommodations letter. Please advise.       Please call Pt when completed.

## 2025-06-23 ENCOUNTER — OFFICE VISIT (OUTPATIENT)
Dept: FAMILY MEDICINE | Facility: CLINIC | Age: 30
End: 2025-06-23
Payer: COMMERCIAL

## 2025-06-23 ENCOUNTER — PATIENT MESSAGE (OUTPATIENT)
Dept: FAMILY MEDICINE | Facility: CLINIC | Age: 30
End: 2025-06-23

## 2025-06-23 DIAGNOSIS — F41.1 GENERALIZED ANXIETY DISORDER WITH PANIC ATTACKS: Primary | ICD-10-CM

## 2025-06-23 DIAGNOSIS — E03.9 ACQUIRED HYPOTHYROIDISM: ICD-10-CM

## 2025-06-23 DIAGNOSIS — F41.0 GENERALIZED ANXIETY DISORDER WITH PANIC ATTACKS: Primary | ICD-10-CM

## 2025-06-23 PROCEDURE — 98004 SYNCH AUDIO-VIDEO EST SF 10: CPT | Mod: 95,,, | Performed by: FAMILY MEDICINE

## 2025-06-23 PROCEDURE — 1160F RVW MEDS BY RX/DR IN RCRD: CPT | Mod: CPTII,95,, | Performed by: FAMILY MEDICINE

## 2025-06-23 PROCEDURE — G2211 COMPLEX E/M VISIT ADD ON: HCPCS | Mod: 95,,, | Performed by: FAMILY MEDICINE

## 2025-06-23 PROCEDURE — 1159F MED LIST DOCD IN RCRD: CPT | Mod: CPTII,95,, | Performed by: FAMILY MEDICINE

## 2025-06-23 NOTE — PATIENT INSTRUCTIONS
Please call this number to schedule your initial counseling appointment. The referral order has been placed, but the patient has to initiate the scheduling.   [phone]

## 2025-06-23 NOTE — PROGRESS NOTES
Subjective:       Patient ID: Magda Lowery is a 29 y.o. female.    Chief Complaint: Anxiety    Patient Active Problem List   Diagnosis    Thyroid nodule    Mild intermittent asthma without complication    Acquired hypothyroidism    Multinodular thyroid    History of fracture of left ankle s/p surgery    Class 3 severe obesity due to excess calories with serious comorbidity and body mass index (BMI) of 40.0 to 44.9 in adult    Leukopenia    Vitamin D deficiency    Generalized anxiety disorder with panic attacks      HPI    Magda is a 29 y.o. female  History of Present Illness    CHIEF COMPLAINT:  Magda presents today for follow up of anxiety affecting school performance.    ANXIETY:  She expresses interest in pursuing counseling as a potential management strategy and plans to explore campus-based counseling resources.    MEDICAL HISTORY:  She reports previous thyroid testing was normal.             Objective:   There were no vitals filed for this visit.      Physical Exam  Constitutional:       General: Patient is not in acute distress.     Appearance: Patient is well-developed. He is not diaphoretic.      Comments: Exam performed as able, but limited due to the inherent nature of telemedicine visit.    HENT:      Head: Normocephalic and atraumatic.   Eyes:      Conjunctiva/sclera: Conjunctivae normal.   Pulmonary:      Effort: No respiratory distress.      Comments: No audible wheezing  No visible respiratory distress  Musculoskeletal:      Cervical back: Normal range of motion.   Skin:     Findings: No rash.      Comments: No visible rash   Neurological:      Mental Status: Patient is alert. Mental status is at baseline.   Psychiatric:         Behavior: Behavior normal.         Thought Content: Thought content normal.       Assessment:       1. Generalized anxiety disorder with panic attacks    2. Acquired hypothyroidism          Plan:   Recent relevant labs results reviewed with patient.         Assessment &  Plan    Reviewed anxiety concerns, particularly impact on school performance.  Agreed with preference to try counseling first.  Ruled out thyroid dysfunction as cause of anxiety based on normal thyroid function.  Supported plan to utilize on-campus counseling resources initially, with option to explore off-campus alternatives if needed.  Recognized potential benefits of in-school counseling due to counselors' familiarity with student-specific challenges.    ANXIETY DISORDER:  - Noted that anxiety is impairing patient's school performance.  - Recommend trying counseling first for anxiety management and referred patient to on-campus counseling services.  - Provided scheduling information in checkout papers.  - Discussed importance of maintaining healthy lifestyle habits to manage anxiety, including adequate sleep and regular exercise.    THYROID EVALUATION:  - Evaluated thyroid medicine results which were normal, confirming they are not the cause of anxiety.    FOLLOW-UP:  - Scheduled follow-up visit in August for regular annual exam.         1. Generalized anxiety disorder with panic attacks    2. Acquired hypothyroidism      Patient's questions answered. Plan reviewed with patient at the end of visit. Relevant precautions to chief complaint and reasons to seek further medical care or to contact the office sooner reviewed with patient.     Follow up in about 2 months (around 8/23/2025) for Annual Exam, (prelabs), - already scheduled.        Part of this note was dictated using voice recognition software. Please excuse any typographical errors.     The patient location is: [place]  The chief complaint leading to consultation is: Anxiety       Visit type: audiovisual    Face to Face time with patient: 10  15 minutes of total time spent on the encounter, which includes face to face time and non-face to face time preparing to see the patient (eg, review of tests), Obtaining and/or reviewing separately obtained history,  Documenting clinical information in the electronic or other health record, Independently interpreting results (not separately reported) and communicating results to the patient/family/caregiver, or Care coordination (not separately reported).     Each patient to whom he or she provides medical services by telemedicine is:  (1) informed of the relationship between the physician and patient and the respective role of any other health care provider with respect to management of the patient; and (2) notified that he or she may decline to receive medical services by telemedicine and may withdraw from such care at any time.

## 2025-08-11 ENCOUNTER — LAB VISIT (OUTPATIENT)
Dept: LAB | Facility: HOSPITAL | Age: 30
End: 2025-08-11
Attending: FAMILY MEDICINE
Payer: COMMERCIAL

## 2025-08-11 DIAGNOSIS — E66.813 CLASS 3 SEVERE OBESITY DUE TO EXCESS CALORIES WITH SERIOUS COMORBIDITY AND BODY MASS INDEX (BMI) OF 40.0 TO 44.9 IN ADULT: ICD-10-CM

## 2025-08-11 DIAGNOSIS — Z00.01 ENCOUNTER FOR GENERAL ADULT MEDICAL EXAMINATION WITH ABNORMAL FINDINGS: ICD-10-CM

## 2025-08-11 DIAGNOSIS — E03.9 ACQUIRED HYPOTHYROIDISM: ICD-10-CM

## 2025-08-11 LAB
ABSOLUTE EOSINOPHIL (OHS): 0.13 K/UL
ABSOLUTE MONOCYTE (OHS): 0.32 K/UL (ref 0.3–1)
ABSOLUTE NEUTROPHIL COUNT (OHS): 1.08 K/UL (ref 1.8–7.7)
ALBUMIN SERPL BCP-MCNC: 3.9 G/DL (ref 3.5–5.2)
ALP SERPL-CCNC: 80 UNIT/L (ref 40–150)
ALT SERPL W/O P-5'-P-CCNC: 11 UNIT/L (ref 0–55)
ANION GAP (OHS): 6 MMOL/L (ref 8–16)
AST SERPL-CCNC: 22 UNIT/L (ref 0–50)
BASOPHILS # BLD AUTO: 0.01 K/UL
BASOPHILS NFR BLD AUTO: 0.3 %
BILIRUB DIRECT SERPL-MCNC: 0.1 MG/DL (ref 0.1–0.3)
BILIRUB SERPL-MCNC: 0.2 MG/DL (ref 0.1–1)
BUN SERPL-MCNC: 12 MG/DL (ref 6–20)
CALCIUM SERPL-MCNC: 9 MG/DL (ref 8.7–10.5)
CHLORIDE SERPL-SCNC: 104 MMOL/L (ref 95–110)
CHOLEST SERPL-MCNC: 176 MG/DL (ref 120–199)
CHOLEST/HDLC SERPL: 3.1 {RATIO} (ref 2–5)
CO2 SERPL-SCNC: 27 MMOL/L (ref 23–29)
CREAT SERPL-MCNC: 0.8 MG/DL (ref 0.5–1.4)
EAG (OHS): 103 MG/DL (ref 68–131)
ERYTHROCYTE [DISTWIDTH] IN BLOOD BY AUTOMATED COUNT: 12.9 % (ref 11.5–14.5)
GFR SERPLBLD CREATININE-BSD FMLA CKD-EPI: >60 ML/MIN/1.73/M2
GLUCOSE SERPL-MCNC: 85 MG/DL (ref 70–110)
HBA1C MFR BLD: 5.2 % (ref 4–5.6)
HCT VFR BLD AUTO: 43.7 % (ref 37–48.5)
HDLC SERPL-MCNC: 56 MG/DL (ref 40–75)
HDLC SERPL: 31.8 % (ref 20–50)
HGB BLD-MCNC: 13.7 GM/DL (ref 12–16)
IMM GRANULOCYTES # BLD AUTO: 0 K/UL (ref 0–0.04)
IMM GRANULOCYTES NFR BLD AUTO: 0 % (ref 0–0.5)
LDLC SERPL CALC-MCNC: 113 MG/DL (ref 63–159)
LYMPHOCYTES # BLD AUTO: 1.43 K/UL (ref 1–4.8)
MCH RBC QN AUTO: 29.5 PG (ref 27–31)
MCHC RBC AUTO-ENTMCNC: 31.4 G/DL (ref 32–36)
MCV RBC AUTO: 94 FL (ref 82–98)
NONHDLC SERPL-MCNC: 120 MG/DL
NUCLEATED RBC (/100WBC) (OHS): 0 /100 WBC
PHOSPHATE SERPL-MCNC: 3.4 MG/DL (ref 2.7–4.5)
PLATELET # BLD AUTO: 287 K/UL (ref 150–450)
PMV BLD AUTO: 9 FL (ref 9.2–12.9)
POTASSIUM SERPL-SCNC: 4.9 MMOL/L (ref 3.5–5.1)
PROT SERPL-MCNC: 7.5 GM/DL (ref 6–8.4)
RBC # BLD AUTO: 4.65 M/UL (ref 4–5.4)
RELATIVE EOSINOPHIL (OHS): 4.4 %
RELATIVE LYMPHOCYTE (OHS): 48.1 % (ref 18–48)
RELATIVE MONOCYTE (OHS): 10.8 % (ref 4–15)
RELATIVE NEUTROPHIL (OHS): 36.4 % (ref 38–73)
SODIUM SERPL-SCNC: 137 MMOL/L (ref 136–145)
TRIGL SERPL-MCNC: 35 MG/DL (ref 30–150)
TSH SERPL-ACNC: 1.9 UIU/ML (ref 0.4–4)
WBC # BLD AUTO: 2.97 K/UL (ref 3.9–12.7)

## 2025-08-11 PROCEDURE — 83718 ASSAY OF LIPOPROTEIN: CPT

## 2025-08-11 PROCEDURE — 83036 HEMOGLOBIN GLYCOSYLATED A1C: CPT

## 2025-08-11 PROCEDURE — 80053 COMPREHEN METABOLIC PANEL: CPT

## 2025-08-11 PROCEDURE — 84100 ASSAY OF PHOSPHORUS: CPT

## 2025-08-11 PROCEDURE — 85025 COMPLETE CBC W/AUTO DIFF WBC: CPT

## 2025-08-11 PROCEDURE — 84075 ASSAY ALKALINE PHOSPHATASE: CPT

## 2025-08-11 PROCEDURE — 84443 ASSAY THYROID STIM HORMONE: CPT

## 2025-08-11 PROCEDURE — 36415 COLL VENOUS BLD VENIPUNCTURE: CPT | Mod: PO

## 2025-08-14 ENCOUNTER — OFFICE VISIT (OUTPATIENT)
Dept: FAMILY MEDICINE | Facility: CLINIC | Age: 30
End: 2025-08-14
Payer: COMMERCIAL

## 2025-08-14 ENCOUNTER — E-CONSULT (OUTPATIENT)
Dept: HEMATOLOGY/ONCOLOGY | Facility: CLINIC | Age: 30
End: 2025-08-14
Payer: COMMERCIAL

## 2025-08-14 ENCOUNTER — PATIENT MESSAGE (OUTPATIENT)
Dept: FAMILY MEDICINE | Facility: CLINIC | Age: 30
End: 2025-08-14

## 2025-08-14 VITALS
HEIGHT: 69 IN | SYSTOLIC BLOOD PRESSURE: 116 MMHG | BODY MASS INDEX: 40.66 KG/M2 | HEART RATE: 99 BPM | DIASTOLIC BLOOD PRESSURE: 76 MMHG | OXYGEN SATURATION: 98 % | WEIGHT: 274.5 LBS

## 2025-08-14 DIAGNOSIS — Z00.01 ENCOUNTER FOR GENERAL ADULT MEDICAL EXAMINATION WITH ABNORMAL FINDINGS: Primary | ICD-10-CM

## 2025-08-14 DIAGNOSIS — Z11.3 ROUTINE SCREENING FOR STI (SEXUALLY TRANSMITTED INFECTION): ICD-10-CM

## 2025-08-14 DIAGNOSIS — F41.0 GENERALIZED ANXIETY DISORDER WITH PANIC ATTACKS: ICD-10-CM

## 2025-08-14 DIAGNOSIS — E66.813 CLASS 3 SEVERE OBESITY DUE TO EXCESS CALORIES WITH SERIOUS COMORBIDITY AND BODY MASS INDEX (BMI) OF 40.0 TO 44.9 IN ADULT: ICD-10-CM

## 2025-08-14 DIAGNOSIS — Z67.A1 BENIGN ETHNIC NEUTROPENIA: Primary | ICD-10-CM

## 2025-08-14 DIAGNOSIS — E05.00 GRAVES DISEASE: ICD-10-CM

## 2025-08-14 DIAGNOSIS — E04.2 MULTINODULAR THYROID: ICD-10-CM

## 2025-08-14 DIAGNOSIS — E03.9 ACQUIRED HYPOTHYROIDISM: ICD-10-CM

## 2025-08-14 DIAGNOSIS — F41.1 GENERALIZED ANXIETY DISORDER WITH PANIC ATTACKS: ICD-10-CM

## 2025-08-14 DIAGNOSIS — D70.8 OTHER NEUTROPENIA: ICD-10-CM

## 2025-08-14 DIAGNOSIS — J45.20 MILD INTERMITTENT ASTHMA WITHOUT COMPLICATION: ICD-10-CM

## 2025-08-14 PROCEDURE — 1159F MED LIST DOCD IN RCRD: CPT | Mod: CPTII,S$GLB,, | Performed by: FAMILY MEDICINE

## 2025-08-14 PROCEDURE — 3044F HG A1C LEVEL LT 7.0%: CPT | Mod: CPTII,S$GLB,, | Performed by: FAMILY MEDICINE

## 2025-08-14 PROCEDURE — 3008F BODY MASS INDEX DOCD: CPT | Mod: CPTII,S$GLB,, | Performed by: FAMILY MEDICINE

## 2025-08-14 PROCEDURE — 3078F DIAST BP <80 MM HG: CPT | Mod: CPTII,S$GLB,, | Performed by: FAMILY MEDICINE

## 2025-08-14 PROCEDURE — 99395 PREV VISIT EST AGE 18-39: CPT | Mod: S$GLB,,, | Performed by: FAMILY MEDICINE

## 2025-08-14 PROCEDURE — 1160F RVW MEDS BY RX/DR IN RCRD: CPT | Mod: CPTII,S$GLB,, | Performed by: FAMILY MEDICINE

## 2025-08-14 PROCEDURE — 99999 PR PBB SHADOW E&M-EST. PATIENT-LVL III: CPT | Mod: PBBFAC,,, | Performed by: FAMILY MEDICINE

## 2025-08-14 PROCEDURE — 3074F SYST BP LT 130 MM HG: CPT | Mod: CPTII,S$GLB,, | Performed by: FAMILY MEDICINE

## 2025-08-14 RX ORDER — LEVOTHYROXINE SODIUM 112 UG/1
112 TABLET ORAL
Qty: 90 TABLET | Refills: 3 | Status: SHIPPED | OUTPATIENT
Start: 2025-08-14

## 2025-08-14 RX ORDER — ALBUTEROL SULFATE 90 UG/1
2 INHALANT RESPIRATORY (INHALATION) EVERY 6 HOURS PRN
Qty: 18 G | Refills: 5 | Status: SHIPPED | OUTPATIENT
Start: 2025-08-14

## 2025-08-18 ENCOUNTER — PATIENT MESSAGE (OUTPATIENT)
Dept: FAMILY MEDICINE | Facility: CLINIC | Age: 30
End: 2025-08-18
Payer: COMMERCIAL

## 2025-08-18 DIAGNOSIS — D72.819 LEUKOPENIA, UNSPECIFIED TYPE: Primary | ICD-10-CM

## 2025-08-19 ENCOUNTER — LAB VISIT (OUTPATIENT)
Dept: LAB | Facility: HOSPITAL | Age: 30
End: 2025-08-19
Attending: FAMILY MEDICINE
Payer: COMMERCIAL

## 2025-08-19 DIAGNOSIS — Z11.3 ROUTINE SCREENING FOR STI (SEXUALLY TRANSMITTED INFECTION): ICD-10-CM

## 2025-08-19 LAB
HIV 1+2 AB+HIV1 P24 AG SERPL QL IA: NORMAL
T PALLIDUM IGG+IGM SER QL: NORMAL

## 2025-08-19 PROCEDURE — 87591 N.GONORRHOEAE DNA AMP PROB: CPT

## 2025-08-19 PROCEDURE — 87389 HIV-1 AG W/HIV-1&-2 AB AG IA: CPT

## 2025-08-19 PROCEDURE — 86593 SYPHILIS TEST NON-TREP QUANT: CPT

## 2025-08-19 PROCEDURE — 36415 COLL VENOUS BLD VENIPUNCTURE: CPT | Mod: PO

## 2025-08-21 LAB
C TRACH DNA SPEC QL NAA+PROBE: NOT DETECTED
CTGC SOURCE (OHS) ORD-325: NORMAL
N GONORRHOEA DNA UR QL NAA+PROBE: NOT DETECTED

## (undated) DEVICE — SUT VICRYL PLUS 3-0 SH 18IN

## (undated) DEVICE — TRAY MINOR ORTHO OMC

## (undated) DEVICE — PAD CAST SPECIALIST STRL 6

## (undated) DEVICE — PAD CAST SPECIALIST STRL 4

## (undated) DEVICE — DRAPE STERI U-SHAPED 47X51IN

## (undated) DEVICE — BLADE SURG CARBON STEEL #10

## (undated) DEVICE — DRAPE C-ARMOR EQUIPMENT COVER

## (undated) DEVICE — TAPE SURG DURAPORE 2 X10YD

## (undated) DEVICE — TOWEL OR DISP STRL BLUE 4/PK

## (undated) DEVICE — BANDAGE ELAS SOFTWRAP ST 6X5YD

## (undated) DEVICE — SUT VICRYL PLUS 0 CT1 18IN

## (undated) DEVICE — DRESSING N ADH OIL EMUL 3X8

## (undated) DEVICE — BANDAGE ESMARK 6X12

## (undated) DEVICE — TIP YANKAUERS BULB NO VENT

## (undated) DEVICE — APPLICATOR CHLORAPREP ORN 26ML

## (undated) DEVICE — BNDG COFLEX FOAM LF2 ST 4X5YD

## (undated) DEVICE — SPONGE GAUZE 16PLY 4X4

## (undated) DEVICE — DRAPE THREE-QTR REINF 53X77IN

## (undated) DEVICE — SPLINT PLASTER F.S. 3INX15IN

## (undated) DEVICE — BANDAGE ACE DOUBLE STER 6IN

## (undated) DEVICE — DRAPE T EXTRM SURG 121X128X90

## (undated) DEVICE — K-WIRE TRCR PT1.25MM D 150MM L
Type: IMPLANTABLE DEVICE | Site: ANKLE | Status: NON-FUNCTIONAL
Removed: 2023-01-06

## (undated) DEVICE — SUT ETHILON 3/0 18IN PS-1

## (undated) DEVICE — BANDAGE ELAS SOFTWRAP ST 4X5YD

## (undated) DEVICE — TOURNIQUET SB QC DP 34X4IN

## (undated) DEVICE — DRILL BITS

## (undated) DEVICE — BIT DRILL CANN QC 2.7X160 SS

## (undated) DEVICE — DRAPE U SPLIT SHEET 54X76IN

## (undated) DEVICE — DRAPE C-ARM ELAS CLIP 42X120IN

## (undated) DEVICE — CATH SUCTION 10FR

## (undated) DEVICE — GAUZE SPONGE 4X4 12PLY

## (undated) DEVICE — DRAPE TOP 53X102IN

## (undated) DEVICE — ELECTRODE REM PLYHSV RETURN 9